# Patient Record
Sex: MALE | Race: WHITE | NOT HISPANIC OR LATINO | Employment: OTHER
[De-identification: names, ages, dates, MRNs, and addresses within clinical notes are randomized per-mention and may not be internally consistent; named-entity substitution may affect disease eponyms.]

---

## 2017-05-11 ENCOUNTER — HOSPITAL (OUTPATIENT)
Dept: OTHER | Age: 67
End: 2017-05-11
Attending: INTERNAL MEDICINE

## 2017-05-11 ENCOUNTER — PRIOR ORIGINAL RECORDS (OUTPATIENT)
Dept: OTHER | Age: 67
End: 2017-05-11

## 2017-05-11 NOTE — H&P
Kaya Rubio  : 10/17/1950  ACCOUNT:  [id number]  [phone]  PCP: Dr. Phil Zuniga     TODAY'S DATE: 2017  DICTATED BY:  [00]    CHIEF COMPLAINT: [Followup of Atherosclerosis Of Native Felicita, Followup of Atherosclerosis, aorta and Followup of Mix Respiration - 18, Temperature - 98.1, Weight -  207, Height -   64 , BMI - 35.5 ]    CONS: comfortable, in no distress and obese. WEIGHT: BMI parameters reviewed and discussed. HEAD/FACE: no trauma and normocephalic.  EYES: conjunctivae not injected and no 0.25MG    one by mouth as needed                   07/08/10 ClonazePAM            1MG       1 TABLET AT BEDTIME.                     01/14/10 Aspirin               325MG     1 TABLET DAILY AS DIRECTED.              01/14/10 TraMADol HCl          50MG

## 2017-05-12 ENCOUNTER — APPOINTMENT (OUTPATIENT)
Dept: GENERAL RADIOLOGY | Facility: HOSPITAL | Age: 67
End: 2017-05-12
Attending: INTERNAL MEDICINE
Payer: MEDICARE

## 2017-05-12 ENCOUNTER — DIAGNOSTIC TRANS (OUTPATIENT)
Dept: OTHER | Age: 67
End: 2017-05-12

## 2017-05-12 ENCOUNTER — HOSPITAL ENCOUNTER (OUTPATIENT)
Dept: INTERVENTIONAL RADIOLOGY/VASCULAR | Facility: HOSPITAL | Age: 67
Discharge: HOME | End: 2017-05-13
Attending: INTERNAL MEDICINE | Admitting: INTERNAL MEDICINE
Payer: MEDICARE

## 2017-05-12 DIAGNOSIS — R07.9 CHEST PAIN: ICD-10-CM

## 2017-05-12 PROBLEM — Z95.820 S/P ANGIOPLASTY WITH STENT: Status: ACTIVE | Noted: 2017-05-12

## 2017-05-12 PROCEDURE — 93010 ELECTROCARDIOGRAM REPORT: CPT | Performed by: INTERNAL MEDICINE

## 2017-05-12 PROCEDURE — 80048 BASIC METABOLIC PNL TOTAL CA: CPT | Performed by: INTERNAL MEDICINE

## 2017-05-12 PROCEDURE — B240ZZ3 ULTRASONOGRAPHY OF SINGLE CORONARY ARTERY, INTRAVASCULAR: ICD-10-PCS | Performed by: INTERNAL MEDICINE

## 2017-05-12 PROCEDURE — 93459 L HRT ART/GRFT ANGIO: CPT

## 2017-05-12 PROCEDURE — 93005 ELECTROCARDIOGRAM TRACING: CPT

## 2017-05-12 PROCEDURE — 85347 COAGULATION TIME ACTIVATED: CPT

## 2017-05-12 PROCEDURE — 71010 XR CHEST AP PORTABLE  (CPT=71010): CPT | Performed by: INTERNAL MEDICINE

## 2017-05-12 PROCEDURE — B211YZZ FLUOROSCOPY OF MULTIPLE CORONARY ARTERIES USING OTHER CONTRAST: ICD-10-PCS | Performed by: INTERNAL MEDICINE

## 2017-05-12 PROCEDURE — 99152 MOD SED SAME PHYS/QHP 5/>YRS: CPT

## 2017-05-12 PROCEDURE — B212YZZ FLUOROSCOPY OF SINGLE CORONARY ARTERY BYPASS GRAFT USING OTHER CONTRAST: ICD-10-PCS | Performed by: INTERNAL MEDICINE

## 2017-05-12 PROCEDURE — 027035Z DILATION OF CORONARY ARTERY, ONE ARTERY WITH TWO DRUG-ELUTING INTRALUMINAL DEVICES, PERCUTANEOUS APPROACH: ICD-10-PCS | Performed by: INTERNAL MEDICINE

## 2017-05-12 PROCEDURE — 4A023N7 MEASUREMENT OF CARDIAC SAMPLING AND PRESSURE, LEFT HEART, PERCUTANEOUS APPROACH: ICD-10-PCS | Performed by: INTERNAL MEDICINE

## 2017-05-12 PROCEDURE — B215YZZ FLUOROSCOPY OF LEFT HEART USING OTHER CONTRAST: ICD-10-PCS | Performed by: INTERNAL MEDICINE

## 2017-05-12 PROCEDURE — 99153 MOD SED SAME PHYS/QHP EA: CPT

## 2017-05-12 PROCEDURE — 85027 COMPLETE CBC AUTOMATED: CPT | Performed by: INTERNAL MEDICINE

## 2017-05-12 PROCEDURE — 92978 ENDOLUMINL IVUS OCT C 1ST: CPT

## 2017-05-12 RX ORDER — TERAZOSIN 5 MG/1
10 CAPSULE ORAL EVERY EVENING
Status: DISCONTINUED | OUTPATIENT
Start: 2017-05-13 | End: 2017-05-13

## 2017-05-12 RX ORDER — PANTOPRAZOLE SODIUM 40 MG/1
40 TABLET, DELAYED RELEASE ORAL DAILY
Status: DISCONTINUED | OUTPATIENT
Start: 2017-05-12 | End: 2017-05-13

## 2017-05-12 RX ORDER — ASPIRIN 325 MG
325 TABLET, DELAYED RELEASE (ENTERIC COATED) ORAL DAILY
Status: DISCONTINUED | OUTPATIENT
Start: 2017-05-13 | End: 2017-05-13

## 2017-05-12 RX ORDER — CLONAZEPAM 1 MG/1
1 TABLET ORAL NIGHTLY PRN
Status: DISCONTINUED | OUTPATIENT
Start: 2017-05-12 | End: 2017-05-13

## 2017-05-12 RX ORDER — DICYCLOMINE HYDROCHLORIDE 10 MG/1
10 CAPSULE ORAL
Status: DISCONTINUED | OUTPATIENT
Start: 2017-05-12 | End: 2017-05-13

## 2017-05-12 RX ORDER — SODIUM CHLORIDE 9 MG/ML
INJECTION, SOLUTION INTRAVENOUS CONTINUOUS
Status: ACTIVE | OUTPATIENT
Start: 2017-05-12 | End: 2017-05-12

## 2017-05-12 RX ORDER — PRAVASTATIN SODIUM 20 MG
80 TABLET ORAL
Status: DISCONTINUED | OUTPATIENT
Start: 2017-05-12 | End: 2017-05-13

## 2017-05-12 RX ORDER — NITROGLYCERIN 20 MG/100ML
5 INJECTION INTRAVENOUS CONTINUOUS
Status: DISCONTINUED | OUTPATIENT
Start: 2017-05-12 | End: 2017-05-13

## 2017-05-12 RX ORDER — NITROGLYCERIN 20 MG/100ML
INJECTION INTRAVENOUS
Status: COMPLETED
Start: 2017-05-12 | End: 2017-05-12

## 2017-05-12 RX ORDER — ASPIRIN 325 MG
325 TABLET, DELAYED RELEASE (ENTERIC COATED) ORAL DAILY
Status: DISCONTINUED | OUTPATIENT
Start: 2017-05-12 | End: 2017-05-12

## 2017-05-12 RX ORDER — ASPIRIN 325 MG
TABLET, DELAYED RELEASE (ENTERIC COATED) ORAL
Status: COMPLETED
Start: 2017-05-12 | End: 2017-05-12

## 2017-05-12 RX ORDER — ISOSORBIDE MONONITRATE 60 MG/1
120 TABLET, EXTENDED RELEASE ORAL DAILY
Status: DISCONTINUED | OUTPATIENT
Start: 2017-05-13 | End: 2017-05-13

## 2017-05-12 RX ORDER — ALPRAZOLAM 0.5 MG/1
0.5 TABLET ORAL 2 TIMES DAILY
Status: DISCONTINUED | OUTPATIENT
Start: 2017-05-12 | End: 2017-05-13

## 2017-05-12 RX ORDER — SODIUM CHLORIDE 9 MG/ML
INJECTION, SOLUTION INTRAVENOUS CONTINUOUS
Status: DISCONTINUED | OUTPATIENT
Start: 2017-05-12 | End: 2017-05-13

## 2017-05-12 RX ORDER — MIDAZOLAM HYDROCHLORIDE 1 MG/ML
INJECTION INTRAMUSCULAR; INTRAVENOUS
Status: COMPLETED
Start: 2017-05-12 | End: 2017-05-12

## 2017-05-12 RX ORDER — ATENOLOL 50 MG/1
50 TABLET ORAL DAILY
Status: DISCONTINUED | OUTPATIENT
Start: 2017-05-13 | End: 2017-05-13

## 2017-05-12 RX ORDER — CLOPIDOGREL BISULFATE 75 MG/1
75 TABLET ORAL DAILY
Status: DISCONTINUED | OUTPATIENT
Start: 2017-05-13 | End: 2017-05-13

## 2017-05-12 RX ORDER — MIDAZOLAM HYDROCHLORIDE 1 MG/ML
INJECTION INTRAMUSCULAR; INTRAVENOUS
Status: DISCONTINUED
Start: 2017-05-12 | End: 2017-05-12 | Stop reason: WASHOUT

## 2017-05-12 RX ORDER — CLOPIDOGREL BISULFATE 75 MG/1
75 TABLET ORAL DAILY
Status: DISCONTINUED | OUTPATIENT
Start: 2017-05-13 | End: 2017-05-12

## 2017-05-12 RX ORDER — HEPARIN SODIUM 5000 [USP'U]/ML
INJECTION, SOLUTION INTRAVENOUS; SUBCUTANEOUS
Status: COMPLETED
Start: 2017-05-12 | End: 2017-05-12

## 2017-05-12 RX ORDER — TRAMADOL HYDROCHLORIDE 50 MG/1
50 TABLET ORAL EVERY 8 HOURS PRN
Status: DISCONTINUED | OUTPATIENT
Start: 2017-05-12 | End: 2017-05-13

## 2017-05-12 RX ORDER — LIDOCAINE HYDROCHLORIDE 10 MG/ML
INJECTION, SOLUTION INFILTRATION; PERINEURAL
Status: COMPLETED
Start: 2017-05-12 | End: 2017-05-12

## 2017-05-12 RX ORDER — ASPIRIN 81 MG/1
324 TABLET, CHEWABLE ORAL ONCE
Status: COMPLETED | OUTPATIENT
Start: 2017-05-12 | End: 2017-05-12

## 2017-05-12 RX ADMIN — ALPRAZOLAM 0.5 MG: 0.5 TABLET ORAL at 11:57:00

## 2017-05-12 RX ADMIN — CLONAZEPAM 1 MG: 1 TABLET ORAL at 22:28:00

## 2017-05-12 RX ADMIN — SODIUM CHLORIDE: 9 INJECTION, SOLUTION INTRAVENOUS at 10:45:00

## 2017-05-12 RX ADMIN — ASPIRIN 324 MG: 81 TABLET, CHEWABLE ORAL at 08:00:00

## 2017-05-12 RX ADMIN — ALPRAZOLAM 0.5 MG: 0.5 TABLET ORAL at 22:28:00

## 2017-05-12 RX ADMIN — NITROGLYCERIN 5 MCG/MIN: 20 INJECTION INTRAVENOUS at 11:32:00

## 2017-05-12 RX ADMIN — SODIUM CHLORIDE: 9 INJECTION, SOLUTION INTRAVENOUS at 08:00:00

## 2017-05-12 NOTE — H&P
History & Physical Examination    Patient Name: Bereket Holder   MRN: [id number]  CSN: [id number]  YOB: 1950    Diagnosis: Coronary artery disease, status post coronary artery bypass surgery, unstable angina    Present Illness: Cardiac Unknown time   psyllium (METAMUCIL SMOOTH TEXTURE) 28 % Oral Powd Pack Take 1 packet by mouth daily.  Disp:  Rfl:  Unknown at Unknown time       Current Facility-Administered Medications:  0.9%  NaCl infusion  Intravenous Continuous       Allergies: No Know LUMBAR/SACRAL, SINGLE LEVEL Right 5/13/2014    Comment Procedure: TRANSFORAMINAL EPIDURAL - LUMBAR;  Surgeon: Gasper Bee MD;  Location: [address] [place] [address]    PATIENT [address] FOR IV ANTIBIOTIC SURGICAL SITE INFECTION PRO

## 2017-05-12 NOTE — PROCEDURES
[facility]  PCI Procedure Note    Lisandro Moreira.  Location: Cath Lab    [facility] [id number] MRN [id number]   Admission Date 5/12/2017 Procedure Date 5/12/2017   Attending Physician Ry Linares MD Procedure Physician Brett Parra MD     Pre-Proced deployed. It was postdilated in the proximal segment with a 3.0 mm balloon. 3.0 x 12 mm Synergy stent was deployed at the ostium of the saphenous vein graft.   The proximal segment was postdilated with a 4.0 mm balloon extending out into the aorta due t lateral branch is widely patent. This is a very large right coronary artery  5. The LIMA to the LAD is widely patent there is no significant retrograde flow there is excellent antegrade flow with no significant disease in the distal LAD  6.   The previous could be wired no balloon would pass into this segment. The patient was sent to the floor in stable condition.     Nancy Hernandez MD  5/12/2017  10:26 AM

## 2017-05-12 NOTE — PLAN OF CARE
Received pt from cath lab w/ daughter at bedside. Pt A&O, denies pain. VSS. NSR on tele, frequent trigeminal PVCs. Angiomax, nitro gtt infusing upon arrival- nitro gtt to remain in place and infuse continuously.   Sheath in place upon arrival, removed

## 2017-05-12 NOTE — DIETARY NOTE
Nutrition Short Note   Dietitian consult received per cardiac rehab standing order. Pt to be educated by cardiac rehab staff and encouraged to attend outpatient classes taught by RD. RD available PRN.      Izzy Saravia RD, LDN  Pager [phone]

## 2017-05-13 ENCOUNTER — PRIOR ORIGINAL RECORDS (OUTPATIENT)
Dept: OTHER | Age: 67
End: 2017-05-13

## 2017-05-13 VITALS
WEIGHT: 200 LBS | HEIGHT: 66 IN | DIASTOLIC BLOOD PRESSURE: 75 MMHG | HEART RATE: 71 BPM | TEMPERATURE: 98 F | OXYGEN SATURATION: 93 % | BODY MASS INDEX: 32.14 KG/M2 | SYSTOLIC BLOOD PRESSURE: 121 MMHG | RESPIRATION RATE: 20 BRPM

## 2017-05-13 PROCEDURE — 80048 BASIC METABOLIC PNL TOTAL CA: CPT | Performed by: INTERNAL MEDICINE

## 2017-05-13 PROCEDURE — 85027 COMPLETE CBC AUTOMATED: CPT | Performed by: INTERNAL MEDICINE

## 2017-05-13 RX ORDER — ATENOLOL 50 MG/1
50 TABLET ORAL DAILY
Qty: 90 TABLET | Refills: 3 | Status: ON HOLD | OUTPATIENT
Start: 2017-05-13 | End: 2019-06-21

## 2017-05-13 RX ADMIN — PANTOPRAZOLE SODIUM 40 MG: 40 TABLET, DELAYED RELEASE ORAL at 10:26:00

## 2017-05-13 RX ADMIN — ISOSORBIDE MONONITRATE 120 MG: 60 TABLET, EXTENDED RELEASE ORAL at 10:26:00

## 2017-05-13 RX ADMIN — ALPRAZOLAM 0.5 MG: 0.5 TABLET ORAL at 10:26:00

## 2017-05-13 RX ADMIN — PRAVASTATIN SODIUM 80 MG: 20 MG TABLET ORAL at 10:25:00

## 2017-05-13 RX ADMIN — ATENOLOL 50 MG: 50 TABLET ORAL at 10:26:00

## 2017-05-13 RX ADMIN — CLOPIDOGREL BISULFATE 75 MG: 75 TABLET ORAL at 10:26:00

## 2017-05-13 RX ADMIN — ASPIRIN 325 MG: 325 MG TABLET, DELAYED RELEASE (ENTERIC COATED) ORAL at 10:25:00

## 2017-05-13 NOTE — CARDIAC REHAB
Completed CAD/stent education. Plans on doing cardiac rehab in [facility]. Has the number. Has been there several times.

## 2017-05-13 NOTE — PROGRESS NOTES
Per Leslie, Cardiac Rehab: they do not give out stent cards on the weekend. It will be mailed to him.

## 2017-05-13 NOTE — PLAN OF CARE
Patient/Family Goals    • Patient/Family Long Term Goal Adequate for Discharge    • Patient/Family Short Term Goal Adequate for Discharge        Neuro: AOx4    HEENT: WNL    Resp: Clear. Room air. Cardiac: NSR w/frequent PVC.     GI: WNL    : WNL    Ri

## 2017-05-13 NOTE — PROGRESS NOTES
[facility] Progress Note    Subjective:  Feels good, uneventful night.      Objective:  /75 mmHg  Pulse 71  Temp(Src) 98.1 °F (36.7 °C) (Oral)  Resp 20  Ht 5' 6\" (1.676 m)  Wt 200 lb (90.719 kg)  BMI 32.30 kg/m2  SpO2 95%    Telemetry: SR, PV

## 2017-05-13 NOTE — PLAN OF CARE
S/p cardiac cath with stent placement. Right groin remains soft, clean, and intact. No signs of hematoma. Denies any chest or groin pain. Nitro drip infusing per orders at 5 mcg/kg/min. Ambulating and voiding without difficulty post procedure.   Sinus

## 2017-05-16 LAB
BUN: 18 MG/DL
CALCIUM: 8.9 MG/DL
CHLORIDE: 106 MEQ/L
CREATININE, SERUM: 0.99 MG/DL
GLUCOSE: 100 MG/DL
HEMATOCRIT: 39.2 %
HEMOGLOBIN: 12.8 G/DL
PLATELETS: 138 K/UL
POTASSIUM, SERUM: 3.7 MEQ/L
RED BLOOD COUNT: 4.47 X 10-6/U
SODIUM: 140 MEQ/L
WHITE BLOOD COUNT: 10.9 X 10-3/U

## 2017-06-22 PROBLEM — Z98.890 S/P COLONOSCOPY: Status: ACTIVE | Noted: 2017-06-22

## 2017-06-22 PROBLEM — Z79.899 ENCOUNTER FOR LONG-TERM (CURRENT) USE OF MEDICATIONS: Status: ACTIVE | Noted: 2017-06-22

## 2017-06-22 PROBLEM — Z11.59 NEED FOR HEPATITIS C SCREENING TEST: Status: ACTIVE | Noted: 2017-06-22

## 2017-08-31 ENCOUNTER — PRIOR ORIGINAL RECORDS (OUTPATIENT)
Dept: OTHER | Age: 67
End: 2017-08-31

## 2017-08-31 ENCOUNTER — HOSPITAL (OUTPATIENT)
Dept: OTHER | Age: 67
End: 2017-08-31
Attending: INTERNAL MEDICINE

## 2017-08-31 LAB
CHOLEST SERPL-MCNC: 134 MG/DL
CHOLEST/HDLC SERPL: 3 {RATIO}
HDLC SERPL-MCNC: 45 MG/DL
LDLC SERPL CALC-MCNC: 77 MG/DL
NONHDLC SERPL-MCNC: 89 MG/DL
TRIGLYCERIDE (TRIGP): 61 MG/DL

## 2017-10-11 ENCOUNTER — PRIOR ORIGINAL RECORDS (OUTPATIENT)
Dept: OTHER | Age: 67
End: 2017-10-11

## 2017-10-22 PROBLEM — Z28.21 INFLUENZA VACCINE REFUSED: Status: ACTIVE | Noted: 2017-10-22

## 2018-04-03 ENCOUNTER — MYAURORA ACCOUNT LINK (OUTPATIENT)
Dept: OTHER | Age: 68
End: 2018-04-03

## 2018-04-03 ENCOUNTER — HOSPITAL ENCOUNTER (OUTPATIENT)
Dept: GENERAL RADIOLOGY | Facility: HOSPITAL | Age: 68
Discharge: HOME | End: 2018-04-03
Attending: INTERNAL MEDICINE
Payer: MEDICARE

## 2018-04-03 ENCOUNTER — LAB ENCOUNTER (OUTPATIENT)
Dept: LAB | Facility: HOSPITAL | Age: 68
End: 2018-04-03
Attending: INTERNAL MEDICINE
Payer: MEDICARE

## 2018-04-03 ENCOUNTER — PRIOR ORIGINAL RECORDS (OUTPATIENT)
Dept: OTHER | Age: 68
End: 2018-04-03

## 2018-04-03 DIAGNOSIS — R07.9 CHEST PAIN: ICD-10-CM

## 2018-04-03 DIAGNOSIS — R53.83 EXHAUSTION: ICD-10-CM

## 2018-04-03 DIAGNOSIS — R53.83 FATIGUE: Primary | ICD-10-CM

## 2018-04-03 DIAGNOSIS — R07.9 CHEST PAIN, UNSPECIFIED: ICD-10-CM

## 2018-04-03 PROCEDURE — 80061 LIPID PANEL: CPT

## 2018-04-03 PROCEDURE — 36415 COLL VENOUS BLD VENIPUNCTURE: CPT

## 2018-04-03 PROCEDURE — 80053 COMPREHEN METABOLIC PANEL: CPT

## 2018-04-03 PROCEDURE — 85025 COMPLETE CBC W/AUTO DIFF WBC: CPT

## 2018-04-03 PROCEDURE — 71046 X-RAY EXAM CHEST 2 VIEWS: CPT | Performed by: INTERNAL MEDICINE

## 2018-04-03 NOTE — HISTORICAL OFFICE NOTE
Miranda Nathanael  : 10/17/1950  ACCOUNT:  [id number]  [phone]  PCP: Dr. Donis Hammans     TODAY'S DATE: 2018  DICTATED BY:  ASHU Poole]      CHIEF COMPLAINT: [Followup of Chest pain.]    HPI:    [On 2018, Rika Strickland, a 75 year catheterization August 2004, Diag.  branch distal SVG graft 5/12/17, dyslipidemia, PTCA/Stent, PTCA/Stent August 2004, PTCA/Stent July 2010, redo CABG 4/95: seq SVG to RPV, RCA; CABG 5/89: LIMA to LAD, SVG to Diag, taxus drug eluting stents of third obtuse interventions in May 2017. Discussed with patient regarding his symptoms again he felt it was very similar to what he had prior to his interventions. He denies any symptoms today.   Patient has not had any recent ischemic workup in terms of stress testing 325MG     1 TABLET DAILY AS DIRECTED.

## 2018-04-04 ENCOUNTER — HOSPITAL ENCOUNTER (OUTPATIENT)
Dept: INTERVENTIONAL RADIOLOGY/VASCULAR | Facility: HOSPITAL | Age: 68
Discharge: HOME | End: 2018-04-04
Attending: INTERNAL MEDICINE | Admitting: INTERNAL MEDICINE
Payer: MEDICARE

## 2018-04-04 VITALS
BODY MASS INDEX: 32.78 KG/M2 | HEART RATE: 75 BPM | HEIGHT: 66 IN | WEIGHT: 204 LBS | OXYGEN SATURATION: 99 % | DIASTOLIC BLOOD PRESSURE: 56 MMHG | TEMPERATURE: 97 F | SYSTOLIC BLOOD PRESSURE: 122 MMHG | RESPIRATION RATE: 16 BRPM

## 2018-04-04 DIAGNOSIS — I25.10 CAD (CORONARY ARTERY DISEASE): ICD-10-CM

## 2018-04-04 PROCEDURE — 4A033BC MEASUREMENT OF ARTERIAL PRESSURE, CORONARY, PERCUTANEOUS APPROACH: ICD-10-PCS | Performed by: INTERNAL MEDICINE

## 2018-04-04 PROCEDURE — 99153 MOD SED SAME PHYS/QHP EA: CPT

## 2018-04-04 PROCEDURE — 93571 IV DOP VEL&/PRESS C FLO 1ST: CPT

## 2018-04-04 PROCEDURE — 99152 MOD SED SAME PHYS/QHP 5/>YRS: CPT

## 2018-04-04 PROCEDURE — 93455 CORONARY ART/GRFT ANGIO S&I: CPT

## 2018-04-04 PROCEDURE — 85347 COAGULATION TIME ACTIVATED: CPT

## 2018-04-04 PROCEDURE — B2111ZZ FLUOROSCOPY OF MULTIPLE CORONARY ARTERIES USING LOW OSMOLAR CONTRAST: ICD-10-PCS | Performed by: INTERNAL MEDICINE

## 2018-04-04 PROCEDURE — B2131ZZ FLUOROSCOPY OF MULTIPLE CORONARY ARTERY BYPASS GRAFTS USING LOW OSMOLAR CONTRAST: ICD-10-PCS | Performed by: INTERNAL MEDICINE

## 2018-04-04 RX ORDER — HEPARIN SODIUM 5000 [USP'U]/ML
INJECTION, SOLUTION INTRAVENOUS; SUBCUTANEOUS
Status: COMPLETED
Start: 2018-04-04 | End: 2018-04-04

## 2018-04-04 RX ORDER — ADENOSINE 3 MG/ML
INJECTION, SOLUTION INTRAVENOUS
Status: COMPLETED
Start: 2018-04-04 | End: 2018-04-04

## 2018-04-04 RX ORDER — LIDOCAINE HYDROCHLORIDE 10 MG/ML
INJECTION, SOLUTION INFILTRATION; PERINEURAL
Status: COMPLETED
Start: 2018-04-04 | End: 2018-04-04

## 2018-04-04 RX ORDER — MIDAZOLAM HYDROCHLORIDE 1 MG/ML
INJECTION INTRAMUSCULAR; INTRAVENOUS
Status: COMPLETED
Start: 2018-04-04 | End: 2018-04-04

## 2018-04-04 RX ORDER — SODIUM CHLORIDE 9 MG/ML
INJECTION, SOLUTION INTRAVENOUS CONTINUOUS
Status: DISCONTINUED | OUTPATIENT
Start: 2018-04-04 | End: 2018-04-04

## 2018-04-04 RX ADMIN — MIDAZOLAM HYDROCHLORIDE 2 MG: 1 INJECTION INTRAMUSCULAR; INTRAVENOUS at 13:00:00

## 2018-04-04 NOTE — H&P
History & Physical Examination    Patient Name: Armon Leyden.   MRN: [id number]  CSN: [id number]  YOB: 1950    Diagnosis: Coronary artery disease, status post coronary artery bypass surgery, recurrent chest pain,    Present Illness: Ca 4/4/2018 at Unknown time   Clopidogrel Bisulfate (PLAVIX) 75 MG Oral Tab Take 75 mg by mouth daily.  Disp:  Rfl:  4/4/2018 at `   TRAMADOL HCL 50 MG Oral Tab TAKE 1 TABLET BY MOUTH EVERY 8 HOURS AS NEEDED FOR PAIN Disp: 60 tablet Rfl: 0 Unknown at [facility]  5/13/2014: INJECTION, ANESTHETIC/STEROID, TRANSFORAMINAL * Right      Comment: Procedure: TRANSFORAMINAL EPIDURAL - LUMBAR;                 Surgeon: Randall Stauffer MD;  Location: [address] [place] [address] MD  4/4/2018  10:13 AM

## 2018-04-04 NOTE — PROGRESS NOTES
Rc'd pt from cath lab in stable condition. VSS. Sheath to right groin is soft, clean and dry. Dr Jorge Govea at bedside. Sheath to remain in place for 1hr, pull and use femstop.  Pt c/o mild discomfort in groin and requesting something prior to sheath removal. V

## 2018-04-04 NOTE — PROCEDURES
[facility]  Angiogram Procedure Note    Rhea Ta.  Location: Cath Lab    [facility] [id number] MRN [id number]   Admission Date 4/4/2018 Procedure Date 4/4/2018   Attending Physician Daniel Crum MD Procedure Physician May Dominguez MD     Pre-Pr Coronary Angiography Findings:  1. The right coronary artery is 100% occluded in the proximal segment  2. The saphenous vein graft to the right coronary artery is patent. There is excellent flow down the PDA.   There is 30-40% disease in a previously sean

## 2018-04-05 ENCOUNTER — PRIOR ORIGINAL RECORDS (OUTPATIENT)
Dept: OTHER | Age: 68
End: 2018-04-05

## 2018-04-06 ENCOUNTER — PRIOR ORIGINAL RECORDS (OUTPATIENT)
Dept: OTHER | Age: 68
End: 2018-04-06

## 2018-04-11 LAB
ALBUMIN: 3.4 G/DL
ALKALINE PHOSPHATATE(ALK PHOS): 69 IU/L
BILIRUBIN TOTAL: 0.5 MG/DL
BUN: 14 MG/DL
CALCIUM: 8.7 MG/DL
CHLORIDE: 109 MEQ/L
CHOLESTEROL, TOTAL: 83 MG/DL
CREATININE, SERUM: 1.09 MG/DL
GLUCOSE: 86 MG/DL
HDL CHOLESTEROL: 36 MG/DL
HEMATOCRIT: 45 %
HEMOGLOBIN: 14.3 G/DL
LDL CHOLESTEROL: 35 MG/DL
PLATELETS: 184 K/UL
POTASSIUM, SERUM: 4.5 MEQ/L
PROTEIN, TOTAL: 7 G/DL
RED BLOOD COUNT: 4.94 X 10-6/U
SGOT (AST): 15 IU/L
SGPT (ALT): 22 IU/L
SODIUM: 143 MEQ/L
TRIGLYCERIDES: 62 MG/DL
WHITE BLOOD COUNT: 8.4 X 10-3/U

## 2018-04-20 ENCOUNTER — PRIOR ORIGINAL RECORDS (OUTPATIENT)
Dept: OTHER | Age: 68
End: 2018-04-20

## 2018-04-26 ENCOUNTER — TELEPHONE (OUTPATIENT)
Dept: NEPHROLOGY | Facility: CLINIC | Age: 68
End: 2018-04-26

## 2018-04-26 DIAGNOSIS — I63.9 CEREBROVASCULAR ACCIDENT (CVA), UNSPECIFIED MECHANISM (HCC): Primary | ICD-10-CM

## 2018-04-28 PROBLEM — K76.0 STEATOSIS OF LIVER: Status: ACTIVE | Noted: 2018-04-28

## 2018-04-29 PROBLEM — Z28.21 REFUSED STREPTOCOCCUS PNEUMONIAE VACCINATION: Status: ACTIVE | Noted: 2018-04-29

## 2018-05-03 ENCOUNTER — HOSPITAL ENCOUNTER (OUTPATIENT)
Dept: CT IMAGING | Facility: HOSPITAL | Age: 68
Discharge: HOME | End: 2018-05-03
Attending: INTERNAL MEDICINE
Payer: MEDICARE

## 2018-05-03 DIAGNOSIS — R07.89 OTHER CHEST PAIN: ICD-10-CM

## 2018-05-03 PROCEDURE — 71260 CT THORAX DX C+: CPT | Performed by: INTERNAL MEDICINE

## 2018-05-08 ENCOUNTER — OFFICE VISIT (OUTPATIENT)
Dept: NEUROLOGY | Facility: CLINIC | Age: 68
End: 2018-05-08

## 2018-05-08 VITALS
DIASTOLIC BLOOD PRESSURE: 68 MMHG | RESPIRATION RATE: 16 BRPM | WEIGHT: 210 LBS | BODY MASS INDEX: 33.75 KG/M2 | SYSTOLIC BLOOD PRESSURE: 129 MMHG | HEART RATE: 65 BPM | HEIGHT: 66 IN

## 2018-05-08 DIAGNOSIS — R47.9 SPEECH DISTURBANCE, UNSPECIFIED TYPE: ICD-10-CM

## 2018-05-08 DIAGNOSIS — I63.81 LACUNAR INFARCTION (HCC): ICD-10-CM

## 2018-05-08 DIAGNOSIS — R26.89 BALANCE PROBLEMS: Primary | ICD-10-CM

## 2018-05-08 DIAGNOSIS — R53.83 OTHER FATIGUE: ICD-10-CM

## 2018-05-08 PROCEDURE — 99203 OFFICE O/P NEW LOW 30 MIN: CPT | Performed by: PHYSICIAN ASSISTANT

## 2018-05-08 RX ORDER — CLONAZEPAM 1 MG/1
1 TABLET ORAL NIGHTLY PRN
COMMUNITY
End: 2020-01-24

## 2018-05-08 RX ORDER — DIAZEPAM 5 MG/1
TABLET ORAL
Qty: 2 TABLET | Refills: 0 | Status: SHIPPED | OUTPATIENT
Start: 2018-05-08 | End: 2018-09-25 | Stop reason: ALTCHOICE

## 2018-05-08 NOTE — PATIENT INSTRUCTIONS
Refill policies:    • Allow 2-3 business days for refills; controlled substances may take longer.   • Contact your pharmacy at least 5 days prior to running out of medication and have them send an electronic request or submit request through the “request re entire amount billed. Precertification and Prior Authorizations: If your physician has recommended that you have a procedure or additional testing performed.   [facility]) will contact your insurance carrier to obtain pre-certi

## 2018-05-08 NOTE — PROGRESS NOTES
[facility] with [address]  10/17/1950  Primary Care Provider:  Lindaann Riedel, MD    5/8/2018    75year old male patient being seen for: Subacute Stroke on CT Scan, Grady Enrique prior to MRI, may repeat one time, Disp: 2 tablet, Rfl: 0  •  PRAVASTATIN SODIUM 80 MG Oral Tab, TAKE 1 TABLET BY MOUTH DAILY, Disp: 90 tablet, Rfl: 3  •  nitroGLYCERIN 0.4 MG Sublingual SL Tab, Place 1 tablet (0.4 mg total) under the tongue every 5 (five) abnormal sounds  Pink nailbeds no Cyanosis    NAd, A&Ox3  EOMI  CN II-XII intact  Strength 5/5 all extremities  DTRs symmetric  FTN intact bilaterally. No drift on exam  Normal Gait. Unable to tandem gait.  Slight swaying on romberg testing    Problem/s Brit Prateek PM    Decision making:  (  ) labs reviewed/ordered - 1  (  ) new diagnosis: - 1  (  ) Images & studies independently reviewed -non F2F  (  ) Case/studies discussed with other caregivers - -non F2F  (  ) Telephone time with patiern or authorized Fam member-

## 2018-05-10 ENCOUNTER — HOSPITAL ENCOUNTER (OUTPATIENT)
Dept: MRI IMAGING | Facility: HOSPITAL | Age: 68
Discharge: HOME | End: 2018-05-10
Attending: INTERNAL MEDICINE
Payer: MEDICARE

## 2018-05-10 ENCOUNTER — IMAGING SERVICES (OUTPATIENT)
Dept: OTHER | Age: 68
End: 2018-05-10

## 2018-05-10 DIAGNOSIS — I63.9 CEREBROVASCULAR ACCIDENT (CVA), UNSPECIFIED MECHANISM (HCC): ICD-10-CM

## 2018-05-10 PROCEDURE — 70551 MRI BRAIN STEM W/O DYE: CPT | Performed by: INTERNAL MEDICINE

## 2018-05-10 PROCEDURE — 70544 MR ANGIOGRAPHY HEAD W/O DYE: CPT | Performed by: INTERNAL MEDICINE

## 2018-05-18 ENCOUNTER — TELEPHONE (OUTPATIENT)
Dept: NEPHROLOGY | Facility: CLINIC | Age: 68
End: 2018-05-18

## 2018-05-22 ENCOUNTER — PRIOR ORIGINAL RECORDS (OUTPATIENT)
Dept: OTHER | Age: 68
End: 2018-05-22

## 2018-05-31 ENCOUNTER — HOSPITAL ENCOUNTER (OUTPATIENT)
Dept: CV DIAGNOSTICS | Facility: HOSPITAL | Age: 68
Discharge: HOME | End: 2018-05-31
Attending: PHYSICIAN ASSISTANT
Payer: MEDICARE

## 2018-05-31 DIAGNOSIS — R47.9 SPEECH DISTURBANCE, UNSPECIFIED TYPE: ICD-10-CM

## 2018-05-31 DIAGNOSIS — R26.89 BALANCE PROBLEMS: ICD-10-CM

## 2018-05-31 DIAGNOSIS — I63.81 LACUNAR INFARCTION (HCC): ICD-10-CM

## 2018-05-31 DIAGNOSIS — R53.83 OTHER FATIGUE: ICD-10-CM

## 2018-05-31 PROBLEM — Z79.899 ENCOUNTER FOR LONG-TERM (CURRENT) USE OF MEDICATIONS: Status: ACTIVE | Noted: 2018-05-31

## 2018-05-31 PROBLEM — Z11.59 NEED FOR HEPATITIS C SCREENING TEST: Status: ACTIVE | Noted: 2018-05-31

## 2018-05-31 PROBLEM — Z98.890 S/P COLONOSCOPY: Status: ACTIVE | Noted: 2018-05-31

## 2018-05-31 PROCEDURE — 93306 TTE W/DOPPLER COMPLETE: CPT | Performed by: PHYSICIAN ASSISTANT

## 2018-06-05 ENCOUNTER — PRIOR ORIGINAL RECORDS (OUTPATIENT)
Dept: OTHER | Age: 68
End: 2018-06-05

## 2018-06-05 ENCOUNTER — TELEPHONE (OUTPATIENT)
Dept: NEUROLOGY | Facility: CLINIC | Age: 68
End: 2018-06-05

## 2018-06-07 ENCOUNTER — HOSPITAL (OUTPATIENT)
Dept: OTHER | Age: 68
End: 2018-06-07
Attending: INTERNAL MEDICINE

## 2018-06-07 ENCOUNTER — PRIOR ORIGINAL RECORDS (OUTPATIENT)
Dept: OTHER | Age: 68
End: 2018-06-07

## 2018-06-18 ENCOUNTER — PRIOR ORIGINAL RECORDS (OUTPATIENT)
Dept: OTHER | Age: 68
End: 2018-06-18

## 2018-06-19 ENCOUNTER — PRIOR ORIGINAL RECORDS (OUTPATIENT)
Dept: OTHER | Age: 68
End: 2018-06-19

## 2018-06-21 ENCOUNTER — HOSPITAL ENCOUNTER (OUTPATIENT)
Dept: MRI IMAGING | Facility: HOSPITAL | Age: 68
Discharge: HOME | End: 2018-06-21
Attending: PHYSICIAN ASSISTANT
Payer: MEDICARE

## 2018-06-21 DIAGNOSIS — I63.81 LACUNAR INFARCTION (HCC): ICD-10-CM

## 2018-06-21 DIAGNOSIS — R53.83 OTHER FATIGUE: ICD-10-CM

## 2018-06-21 DIAGNOSIS — R26.89 BALANCE PROBLEMS: ICD-10-CM

## 2018-06-21 DIAGNOSIS — R47.9 SPEECH DISTURBANCE, UNSPECIFIED TYPE: ICD-10-CM

## 2018-06-21 PROCEDURE — 70549 MR ANGIOGRAPH NECK W/O&W/DYE: CPT | Performed by: PHYSICIAN ASSISTANT

## 2018-06-21 PROCEDURE — A9576 INJ PROHANCE MULTIPACK: HCPCS | Performed by: PHYSICIAN ASSISTANT

## 2018-06-22 ENCOUNTER — TELEPHONE (OUTPATIENT)
Dept: NEUROLOGY | Facility: CLINIC | Age: 68
End: 2018-06-22

## 2018-06-22 NOTE — TELEPHONE ENCOUNTER
----- Message from KVNG Yang sent at 6/21/2018  8:42 PM CDT -----  Please call to inform patient that MRA neck was unremarkable.

## 2018-06-29 ENCOUNTER — PRIOR ORIGINAL RECORDS (OUTPATIENT)
Dept: OTHER | Age: 68
End: 2018-06-29

## 2018-07-12 ENCOUNTER — PRIOR ORIGINAL RECORDS (OUTPATIENT)
Dept: OTHER | Age: 68
End: 2018-07-12

## 2018-07-12 ENCOUNTER — HOSPITAL (OUTPATIENT)
Dept: OTHER | Age: 68
End: 2018-07-12
Attending: INTERNAL MEDICINE

## 2018-07-13 ENCOUNTER — PRIOR ORIGINAL RECORDS (OUTPATIENT)
Dept: OTHER | Age: 68
End: 2018-07-13

## 2018-07-26 ENCOUNTER — PRIOR ORIGINAL RECORDS (OUTPATIENT)
Dept: OTHER | Age: 68
End: 2018-07-26

## 2018-09-25 ENCOUNTER — LABORATORY ENCOUNTER (OUTPATIENT)
Dept: LAB | Age: 68
End: 2018-09-25
Attending: INTERNAL MEDICINE
Payer: MEDICARE

## 2018-09-25 ENCOUNTER — OFFICE VISIT (OUTPATIENT)
Dept: INTERNAL MEDICINE CLINIC | Facility: CLINIC | Age: 68
End: 2018-09-25
Payer: MEDICARE

## 2018-09-25 VITALS
HEIGHT: 66 IN | BODY MASS INDEX: 33.53 KG/M2 | HEART RATE: 68 BPM | TEMPERATURE: 98 F | DIASTOLIC BLOOD PRESSURE: 64 MMHG | WEIGHT: 208.63 LBS | SYSTOLIC BLOOD PRESSURE: 122 MMHG

## 2018-09-25 DIAGNOSIS — M79.641 BILATERAL HAND PAIN: ICD-10-CM

## 2018-09-25 DIAGNOSIS — N40.1 BPH ASSOCIATED WITH NOCTURIA: ICD-10-CM

## 2018-09-25 DIAGNOSIS — M79.642 BILATERAL HAND PAIN: ICD-10-CM

## 2018-09-25 DIAGNOSIS — E78.00 ELEVATED CHOLESTEROL: ICD-10-CM

## 2018-09-25 DIAGNOSIS — I25.10 ASHD (ARTERIOSCLEROTIC HEART DISEASE): Primary | ICD-10-CM

## 2018-09-25 DIAGNOSIS — I63.81 LACUNAR INFARCTION (HCC): ICD-10-CM

## 2018-09-25 DIAGNOSIS — Z95.1 HX OF CABG: ICD-10-CM

## 2018-09-25 DIAGNOSIS — R97.20 ELEVATED PSA: ICD-10-CM

## 2018-09-25 DIAGNOSIS — Z98.61 S/P PTCA (PERCUTANEOUS TRANSLUMINAL CORONARY ANGIOPLASTY): ICD-10-CM

## 2018-09-25 DIAGNOSIS — R35.1 BPH ASSOCIATED WITH NOCTURIA: ICD-10-CM

## 2018-09-25 DIAGNOSIS — G47.33 OBSTRUCTIVE SLEEP APNEA: ICD-10-CM

## 2018-09-25 LAB — RHEUMATOID FACT SERPL-ACNC: <10 IU/ML (ref ?–15)

## 2018-09-25 PROCEDURE — 86431 RHEUMATOID FACTOR QUANT: CPT

## 2018-09-25 PROCEDURE — 99204 OFFICE O/P NEW MOD 45 MIN: CPT | Performed by: INTERNAL MEDICINE

## 2018-09-25 PROCEDURE — 86200 CCP ANTIBODY: CPT

## 2018-09-26 NOTE — PROGRESS NOTES
Patient presents with:  Establish Care: LG. Room 9. HPI:  Here to establish care for htn, high chol,  Severe cad since age 43, antoni on cpap, bph with psa elevation seen by gu. Pt is stable, his md retired.  Feels well currently, moves slower than in hi injections [6/16] - HOLGER Monreal     Refused Influenza vaccine      Steatosis of liver / Dx by U/S [4/18]     Refused Streptococcus pneumoniae vaccination     Lacunar infarction, [ ~ 4/18 ] Rt internal capsule ([facility]     Hepatitis C screening [6/18/14] - negati kg/m²   Constitutional: Oriented to person, place, and time. No distress. HEENT:  Normocephalic and atraumatic. TM's wnl. Nose normal. Oropharynx is clear and moist.   Eyes: Conjunctivae wnl. Neck: Normal range of motion. Neck supple.  Normal carotid pu

## 2018-09-27 ENCOUNTER — HOSPITAL ENCOUNTER (OUTPATIENT)
Dept: GENERAL RADIOLOGY | Facility: HOSPITAL | Age: 68
Discharge: HOME | End: 2018-09-27
Attending: INTERNAL MEDICINE
Payer: MEDICARE

## 2018-09-27 DIAGNOSIS — M79.642 BILATERAL HAND PAIN: ICD-10-CM

## 2018-09-27 DIAGNOSIS — M79.641 BILATERAL HAND PAIN: ICD-10-CM

## 2018-09-27 LAB — CYCLIC CITRULLINATED PEPTIDE: 51 UNITS

## 2018-09-27 PROCEDURE — 73130 X-RAY EXAM OF HAND: CPT | Performed by: INTERNAL MEDICINE

## 2018-09-28 ENCOUNTER — TELEPHONE (OUTPATIENT)
Dept: INTERNAL MEDICINE CLINIC | Facility: CLINIC | Age: 68
End: 2018-09-28

## 2018-10-09 ENCOUNTER — OFFICE VISIT (OUTPATIENT)
Dept: RHEUMATOLOGY | Facility: CLINIC | Age: 68
End: 2018-10-09
Payer: MEDICARE

## 2018-10-09 VITALS
SYSTOLIC BLOOD PRESSURE: 112 MMHG | HEART RATE: 68 BPM | DIASTOLIC BLOOD PRESSURE: 72 MMHG | RESPIRATION RATE: 16 BRPM | WEIGHT: 209 LBS | BODY MASS INDEX: 34 KG/M2

## 2018-10-09 DIAGNOSIS — M19.049 CMC ARTHRITIS: ICD-10-CM

## 2018-10-09 DIAGNOSIS — R76.8 POSITIVE ANTI-CCP TEST: ICD-10-CM

## 2018-10-09 DIAGNOSIS — M47.812 SPONDYLOSIS OF CERVICAL REGION WITHOUT MYELOPATHY OR RADICULOPATHY: ICD-10-CM

## 2018-10-09 DIAGNOSIS — M19.90 INFLAMMATORY ARTHRITIS: Primary | ICD-10-CM

## 2018-10-09 PROCEDURE — 96372 THER/PROPH/DIAG INJ SC/IM: CPT | Performed by: INTERNAL MEDICINE

## 2018-10-09 PROCEDURE — 99204 OFFICE O/P NEW MOD 45 MIN: CPT | Performed by: INTERNAL MEDICINE

## 2018-10-09 RX ORDER — METHYLPREDNISOLONE ACETATE 40 MG/ML
40 INJECTION, SUSPENSION INTRA-ARTICULAR; INTRALESIONAL; INTRAMUSCULAR; SOFT TISSUE ONCE
Status: COMPLETED | OUTPATIENT
Start: 2018-10-09 | End: 2018-10-09

## 2018-10-09 RX ADMIN — METHYLPREDNISOLONE ACETATE 40 MG: 40 INJECTION, SUSPENSION INTRA-ARTICULAR; INTRALESIONAL; INTRAMUSCULAR; SOFT TISSUE at 17:14:00

## 2018-10-09 NOTE — PROGRESS NOTES
?  RHEUMATOLOGY NEW PATIENT   Date of visit: 10/9/2018  ? Patient presents with:  Hand Pain: NP ref by PCP for bilateral wirst/hand pain. Pt states 'is having a hard time with lifting fingers or things, when the pain is bad, it's pretty bad.  Pain will w TO 9 ENAS; Future  -     methylPREDNISolone acetate (DEPO-medrol) 40 MG/ML injection 40 mg; Inject 1 mL (40 mg total) into the articular space once. -     RHEUMATOID ARTHRITIS FACTOR; Future  -     CYCLIC CITRULLINATE PEP.  IGG; Future    Positive anti-CC prolonged bleeding/easy bruising but attributes to anti-platelet tx for heart. Has family history of rheumatoid arthritis in his father. States he had it in his neck. But not sure if he was on any medication for it.    Both siblings (one brother, one si Procedure Laterality Date   • ANGIOGRAM     • ANGIOPLASTY (CORONARY)      2014   • ANGIOPLASTY (CORONARY)      11/2015   • ANGIOPLASTY (CORONARY)  1/12/16    PTCA-right Posterior   • CABG      1988, 1994   • CATH BARE METAL STENT (BMS)     • COLONOSCOPY PREOPERATIVE ORDER FOR IV ANTIBIOTIC SURGICAL SITE INFECTION PROPHYLAXIS.   4/14/2014    Procedure: LUMBAR RADIOFREQUENCY;  Surgeon: Krista Antonio MD;  Location: [facility]   • PATIENT [address] FOR IV ANTIBIOTIC SURG tablet Rfl: 0   Isosorbide Mononitrate ER 60 MG Oral Tablet 24 Hr Take 120 mg by mouth every morning.  Along with 60 mg at night  Disp:  Rfl: 0   ALPRAZOLAM 0.25 MG Oral Tab TAKE 1 TABLET BY MOUTH 2 TIMES A DAY AS NEEDED Disp: 60 tablet Rfl: 2   lansoprazol ?  Current Weight Height BMI BSA Pain   Wt Readings from Last 1 Encounters:  10/09/18 : 209 lb     Body mass index is 33.73 kg/m². Body surface area is 2.04 meters squared.          Physical Exam   Constitutional: He is oriented to person, place, and time vitals reviewed. ?  Radiology review:  Xr Hand (min 3 Views), Left (cpt=73130)    Result Date: 9/27/2018  CONCLUSION:  Mild osteoarthritic changes are present. No acute fracture or other acute bony process.    Dictated by: Gisselle Oconnor MD on 9/27/2018

## 2018-10-10 ENCOUNTER — TELEPHONE (OUTPATIENT)
Dept: RHEUMATOLOGY | Facility: CLINIC | Age: 68
End: 2018-10-10

## 2018-10-10 RX ORDER — METHYLPREDNISOLONE 4 MG/1
TABLET ORAL
Qty: 1 KIT | Refills: 0 | Status: SHIPPED | OUTPATIENT
Start: 2018-10-10 | End: 2018-12-18

## 2018-10-10 NOTE — TELEPHONE ENCOUNTER
Returned pt's call. States after the injection he noticed significant improvement of pain of right hand. Still having pain in left hand today. Will order medrol dose pack and monitor for continued improvement of symptoms.  Pt will go to get bloodwork done t

## 2018-10-11 ENCOUNTER — HOSPITAL (OUTPATIENT)
Dept: OTHER | Age: 68
End: 2018-10-11

## 2018-10-11 LAB
ANA TITER: <40
CCP3 IGG: 58 UNITS
CRP INFLAMMATION: 9.4 MG/L (ref 0.1–8.2)
RF: <20 INT.UNITS/ML
SED RATE: 15 MM/HR (ref 0–20)

## 2018-10-15 ENCOUNTER — TELEPHONE (OUTPATIENT)
Dept: RHEUMATOLOGY | Facility: CLINIC | Age: 68
End: 2018-10-15

## 2018-10-15 RX ORDER — METHYLPREDNISOLONE ACETATE 40 MG/ML
40 INJECTION, SUSPENSION INTRA-ARTICULAR; INTRALESIONAL; INTRAMUSCULAR; SOFT TISSUE ONCE
Status: DISCONTINUED | OUTPATIENT
Start: 2018-10-15 | End: 2019-02-19

## 2018-10-15 NOTE — TELEPHONE ENCOUNTER
Returned pt's call. States the depomedrol has improved his symptoms, mostly of the right hand but still has some left hand pain. Swelling has significantly improved. Discussed elevated ESR/CRP and negative RF, repeat CCP pending.  I do believe that he has a

## 2018-10-19 ENCOUNTER — TELEPHONE (OUTPATIENT)
Dept: RHEUMATOLOGY | Facility: CLINIC | Age: 68
End: 2018-10-19

## 2018-10-25 RX ORDER — PREDNISONE 1 MG/1
TABLET ORAL
Qty: 60 TABLET | Refills: 0 | Status: SHIPPED | OUTPATIENT
Start: 2018-10-25 | End: 2018-11-01

## 2018-10-25 NOTE — TELEPHONE ENCOUNTER
Returned pt's call. States hands have become swollen and tender again since stopping the steroids. Reports noting a significant improvement while on medrol. Will start pt on low dose prednisone and see pt sooner to re-evaluate.      Patient verbalized un

## 2018-11-01 ENCOUNTER — OFFICE VISIT (OUTPATIENT)
Dept: RHEUMATOLOGY | Facility: CLINIC | Age: 68
End: 2018-11-01
Payer: MEDICARE

## 2018-11-01 VITALS
BODY MASS INDEX: 34 KG/M2 | WEIGHT: 208 LBS | RESPIRATION RATE: 16 BRPM | HEART RATE: 72 BPM | SYSTOLIC BLOOD PRESSURE: 116 MMHG | DIASTOLIC BLOOD PRESSURE: 72 MMHG

## 2018-11-01 DIAGNOSIS — R76.8 CYCLIC CITRULLINATED PEPTIDE (CCP) ANTIBODY POSITIVE: ICD-10-CM

## 2018-11-01 DIAGNOSIS — M06.041 RHEUMATOID ARTHRITIS INVOLVING BOTH HANDS WITH NEGATIVE RHEUMATOID FACTOR (HCC): Primary | ICD-10-CM

## 2018-11-01 DIAGNOSIS — M06.042 RHEUMATOID ARTHRITIS INVOLVING BOTH HANDS WITH NEGATIVE RHEUMATOID FACTOR (HCC): Primary | ICD-10-CM

## 2018-11-01 DIAGNOSIS — M19.049 CMC ARTHRITIS: ICD-10-CM

## 2018-11-01 DIAGNOSIS — Z79.899 HIGH RISK MEDICATION USE: ICD-10-CM

## 2018-11-01 PROCEDURE — 99215 OFFICE O/P EST HI 40 MIN: CPT | Performed by: INTERNAL MEDICINE

## 2018-11-01 RX ORDER — PREDNISONE 1 MG/1
TABLET ORAL
Qty: 120 TABLET | Refills: 0 | Status: SHIPPED | OUTPATIENT
Start: 2018-11-01 | End: 2019-02-19

## 2018-11-01 RX ORDER — HYDROXYCHLOROQUINE SULFATE 200 MG/1
TABLET, FILM COATED ORAL
Qty: 60 TABLET | Refills: 1 | Status: SHIPPED | OUTPATIENT
Start: 2018-11-01 | End: 2018-12-18

## 2018-11-01 NOTE — PROGRESS NOTES
?  RHEUMATOLOGY Follow up   Date of visit: 11/1/2018  ? Patient presents with:  Hand Pain: Pt states 'feels like left hand near thumb is hurting more. Fingers feel better and while being on steroids is also feeling better.'     ?   ASSESSMENT, DISCUSSION benefit of this medication outweighs its risk. Patient verbalized understanding of above instructions. No further questions at this time.     ?  Plan:  Diagnoses and all orders for this visit:    Rheumatoid arthritis involving both hands with negative rhe Does feel like his BP has been slightly elevated since starting the prednisone. HPI from initial consultation  Has been having significant hand pain and swelling over the past few months (3-4m).  Has tried taking tylenol which hasn't helped the pain at renal disease, or history of seizures. No history of prior blood clot in the legs or lungs. Denies nonhealing ulcers on the fingertips, skin calcifications, trouble swallowing.   The patient denies any history of uveitis, bloody stools, nodular painful sh MANAGEMENT   • INJECTION, ANESTHETIC/STEROID, TRANSFORAMINAL EPIDURAL; LUMBAR/SACRAL, SINGLE LEVEL  7/23/2012    Procedure: TRANSFORAMINAL EPIDURAL - LUMBAR;  Surgeon: Marissa Winkler MD;  Location: [address] [place] [address]   • INJECTION, ANESTHETIC/ • TRANSFORAMINAL EPIDURAL - LUMBAR N/A 7/23/2012    Performed by Deborah Rees MD at [facility]   • TRANSFORAMINAL EPIDURAL - LUMBAR N/A 6/29/2012    Performed by Deborah Rees MD at [address] [facility] OF SYSTEMS   ? Review of Systems   Constitutional: Positive for malaise/fatigue. HENT: Negative for hearing loss and tinnitus. Eyes: Negative for blurred vision and double vision. Respiratory: Negative for shortness of breath and wheezing.     Marry Cavazos elbows, ankles, or joints of the feet. +right elbow crepitus. Bilateral shoulders with restricted ROM, particularly right shoulder with abduction. Bilateral knees without medial joint line tenderness, mild crepitus, no effusion.    Lymphadenopathy: deformity or other acute bony abnormality. Osteoarthritic changes are noted, mild degree. Tissue swelling involving particularly the index finger and to a lesser extent the thumb.        =====  CONCLUSION:  Mild osteoarthritic changes are present.  No acut

## 2018-11-01 NOTE — PATIENT INSTRUCTIONS
-- continue prednisone 10mg daily x 2 weeks then decrease to 1.5 tabs (7.5mg) until next appointment, if feeling well can try to decrease to one tablet  -- start plaquenil one tablet daily x 7 days then increase to two tablets daily  -- call me with any qu dangerous change in heartbeat or heart rhythm like chest pain; dizziness; fast or irregular heartbeat; palpitations; feeling faint or lightheaded, falls; breathing problems  · signs and symptoms of liver injury like dark yellow or brown urine; general ill and 30 degrees C (59 and 86 degrees F). Protect from moisture and light. Throw away any unused medicine after the expiration date. What should I tell my health care provider before I take this medicine?   They need to know if you have any of these conditio

## 2018-11-05 ENCOUNTER — TELEPHONE (OUTPATIENT)
Dept: RHEUMATOLOGY | Facility: CLINIC | Age: 68
End: 2018-11-05

## 2018-11-05 NOTE — TELEPHONE ENCOUNTER
Pt called. Pt states 'would like to know if should start Plaquenil now or wait until after has a vision exam? To get an appointment with eye doctor will take a few weeks.' Please advise.         Pt [phone]

## 2018-11-13 ENCOUNTER — TELEPHONE (OUTPATIENT)
Dept: RHEUMATOLOGY | Facility: CLINIC | Age: 68
End: 2018-11-13

## 2018-11-13 NOTE — TELEPHONE ENCOUNTER
Called pt informed, it's not typically to have eye changes so soon after starting plaquenil. He should discuss with the ophthalmologist and PCP and update us on what they say. Pt to continue plaquenil BID. Pt verbalized understanding.

## 2018-11-13 NOTE — TELEPHONE ENCOUNTER
Pt called. Pt states 'just wanted Ty [place]uns[place] to know that everything cleared up.  Whatever was going on has gone away.' mp

## 2018-11-13 NOTE — TELEPHONE ENCOUNTER
Pt called he increased his plaquenil to BID yesterday and woke up this morning with right eye blurriness that has not subsided. Pt stated he did get baseline eye exam that was normal prior to starting plaquenil.  Pt did not take his plaquenil this am, wants

## 2018-12-01 ENCOUNTER — PRIOR ORIGINAL RECORDS (OUTPATIENT)
Dept: HEALTH INFORMATION MANAGEMENT | Facility: OTHER | Age: 68
End: 2018-12-01

## 2018-12-03 ENCOUNTER — TELEPHONE (OUTPATIENT)
Dept: RHEUMATOLOGY | Facility: CLINIC | Age: 68
End: 2018-12-03

## 2018-12-03 NOTE — TELEPHONE ENCOUNTER
Returned pt's call. Reports since starting the plaquenil, he has been having worsened dizziness. He did see ophthalmology which grossly normal exam. Pt states he did notice improvement of symptoms in hands however the dizziness is becoming unbearable.   R

## 2018-12-18 ENCOUNTER — LAB ENCOUNTER (OUTPATIENT)
Dept: LAB | Age: 68
End: 2018-12-18
Attending: INTERNAL MEDICINE
Payer: MEDICARE

## 2018-12-18 ENCOUNTER — OFFICE VISIT (OUTPATIENT)
Dept: RHEUMATOLOGY | Facility: CLINIC | Age: 68
End: 2018-12-18
Payer: MEDICARE

## 2018-12-18 VITALS
HEART RATE: 68 BPM | RESPIRATION RATE: 16 BRPM | DIASTOLIC BLOOD PRESSURE: 74 MMHG | SYSTOLIC BLOOD PRESSURE: 126 MMHG | WEIGHT: 211 LBS | BODY MASS INDEX: 34 KG/M2

## 2018-12-18 DIAGNOSIS — M06.041 RHEUMATOID ARTHRITIS INVOLVING BOTH HANDS WITH NEGATIVE RHEUMATOID FACTOR (HCC): Primary | ICD-10-CM

## 2018-12-18 DIAGNOSIS — M19.049 CMC ARTHRITIS: ICD-10-CM

## 2018-12-18 DIAGNOSIS — M06.041 RHEUMATOID ARTHRITIS INVOLVING BOTH HANDS WITH NEGATIVE RHEUMATOID FACTOR (HCC): ICD-10-CM

## 2018-12-18 DIAGNOSIS — M06.042 RHEUMATOID ARTHRITIS INVOLVING BOTH HANDS WITH NEGATIVE RHEUMATOID FACTOR (HCC): ICD-10-CM

## 2018-12-18 DIAGNOSIS — R76.8 CYCLIC CITRULLINATED PEPTIDE (CCP) ANTIBODY POSITIVE: ICD-10-CM

## 2018-12-18 DIAGNOSIS — M65.4 DE QUERVAIN'S DISEASE (TENOSYNOVITIS): ICD-10-CM

## 2018-12-18 DIAGNOSIS — M06.042 RHEUMATOID ARTHRITIS INVOLVING BOTH HANDS WITH NEGATIVE RHEUMATOID FACTOR (HCC): Primary | ICD-10-CM

## 2018-12-18 DIAGNOSIS — Z79.899 HIGH RISK MEDICATION USE: ICD-10-CM

## 2018-12-18 PROCEDURE — 80053 COMPREHEN METABOLIC PANEL: CPT

## 2018-12-18 PROCEDURE — 86140 C-REACTIVE PROTEIN: CPT

## 2018-12-18 PROCEDURE — 99215 OFFICE O/P EST HI 40 MIN: CPT | Performed by: INTERNAL MEDICINE

## 2018-12-18 PROCEDURE — 85025 COMPLETE CBC W/AUTO DIFF WBC: CPT

## 2018-12-18 PROCEDURE — 85652 RBC SED RATE AUTOMATED: CPT

## 2018-12-18 NOTE — PROGRESS NOTES
?  RHEUMATOLOGY Follow up   Date of visit: 12/18/2018  ? Patient presents with:  Rheumatoid Arthritis: f/u. Pt did not have blood work done. Pt states 'is having pain in hands, they hurt so much that can't run jacket over them.  Sometimes will have a renetta ([facility]    Cyclic citrullinated peptide (CCP) antibody positive    High risk medication use    CMC arthritis    De Quervain's disease (tenosynovitis)        Return in about 2 months (around 2/18/2019). ?   HPI   Maxim Salinas. is a 70year old male wi see Dr. Newton Fears and had cortisone injection about one year ago which helped with the pain. However pt states pain is coming back now in that shoulder. Hx of GERD on lansoprazole. Hx of endoscopy. Was on Bentyl but stopped after some time.    Hx of chronic l ill-defined, cerebrovascular disease    • Arthritis    • Atherosclerosis of coronary artery    • BACK PAIN    • Blurred vision    • BPH (benign prostatic hyperplasia)    • Chest pain on exertion    • Coronary atherosclerosis    • Heart palpitations    • Hi Frequency: Monthly or less      Drinks per session: 1 or 2      Binge frequency: Never      Comment: Cage done 9-25-18    Drug use: No    Medications:    No outpatient medications have been marked as taking for the 12/18/18 encounter (Office Visit) with BRENNA well-nourished. No distress. HENT:   Head: Normocephalic. Right Ear: External ear normal.   Left Ear: External ear normal.   Eyes: Conjunctivae and EOM are normal. Pupils are equal, round, and reactive to light. No scleral icterus. Neck: Neck supple. noted, mild degree. No soft tissue abnormalities.        =====  CONCLUSION:  Mild osteoarthritic changes are present. No acute fracture or other acute bony process.      PROCEDURE:  XR HAND (MIN 3 VIEWS), LEFT (CPT=73130)     TECHNIQUE:  Three views were o BILT 0.6 12/18/2018    TP 7.0 12/18/2018    ALB 3.7 12/18/2018    GLOBULIN 3.3 12/18/2018    AGRATIO 2.0 06/17/2014     12/18/2018    K 4.4 12/18/2018     12/18/2018    CO2 25.0 12/18/2018       Additional Labs:  10/2018  ESR 15 (N)  CRP high

## 2018-12-20 DIAGNOSIS — M06.042 RHEUMATOID ARTHRITIS INVOLVING BOTH HANDS WITH NEGATIVE RHEUMATOID FACTOR (HCC): Primary | ICD-10-CM

## 2018-12-20 DIAGNOSIS — M06.041 RHEUMATOID ARTHRITIS INVOLVING BOTH HANDS WITH NEGATIVE RHEUMATOID FACTOR (HCC): Primary | ICD-10-CM

## 2018-12-20 RX ORDER — METHYLPREDNISOLONE 4 MG/1
TABLET ORAL
Qty: 1 KIT | Refills: 0 | Status: SHIPPED | OUTPATIENT
Start: 2018-12-20 | End: 2019-02-19

## 2019-01-01 ENCOUNTER — EXTERNAL RECORD (OUTPATIENT)
Dept: HEALTH INFORMATION MANAGEMENT | Facility: OTHER | Age: 69
End: 2019-01-01

## 2019-01-25 ENCOUNTER — PRIOR ORIGINAL RECORDS (OUTPATIENT)
Dept: OTHER | Age: 69
End: 2019-01-25

## 2019-02-05 ENCOUNTER — PRIOR ORIGINAL RECORDS (OUTPATIENT)
Dept: OTHER | Age: 69
End: 2019-02-05

## 2019-02-05 ENCOUNTER — HOSPITAL (OUTPATIENT)
Dept: OTHER | Age: 69
End: 2019-02-05
Attending: INTERNAL MEDICINE

## 2019-02-05 LAB
TSH SERPL-ACNC: 0.83 MCUNIT/ML (ref 0.35–5)
TSH SERPL-ACNC: 0.83 MCUNITS/ML (ref 0.35–5)

## 2019-02-05 NOTE — TELEPHONE ENCOUNTER
Called pt whom stated his hands were slightly improved when on medrol dose pack, but when finished bilateral hand pain/swelling resumed with left hand worse than right. altered level of consciousness

## 2019-02-08 LAB
ALT (SGPT): 22 U/L
AST (SGOT): 15 U/L
THYROID STIMULATING HORMONE: 0.83 MLU/L

## 2019-02-12 ENCOUNTER — PRIOR ORIGINAL RECORDS (OUTPATIENT)
Dept: OTHER | Age: 69
End: 2019-02-12

## 2019-02-18 ENCOUNTER — PRIOR ORIGINAL RECORDS (OUTPATIENT)
Dept: OTHER | Age: 69
End: 2019-02-18

## 2019-02-19 ENCOUNTER — TELEPHONE (OUTPATIENT)
Dept: INTERNAL MEDICINE CLINIC | Facility: CLINIC | Age: 69
End: 2019-02-19

## 2019-02-19 ENCOUNTER — OFFICE VISIT (OUTPATIENT)
Dept: RHEUMATOLOGY | Facility: CLINIC | Age: 69
End: 2019-02-19
Payer: MEDICARE

## 2019-02-19 VITALS
RESPIRATION RATE: 16 BRPM | DIASTOLIC BLOOD PRESSURE: 64 MMHG | SYSTOLIC BLOOD PRESSURE: 122 MMHG | TEMPERATURE: 97 F | HEART RATE: 80 BPM | BODY MASS INDEX: 34 KG/M2 | WEIGHT: 211 LBS

## 2019-02-19 DIAGNOSIS — M79.641 BILATERAL HAND PAIN: ICD-10-CM

## 2019-02-19 DIAGNOSIS — M47.812 SPONDYLOSIS OF CERVICAL REGION WITHOUT MYELOPATHY OR RADICULOPATHY: ICD-10-CM

## 2019-02-19 DIAGNOSIS — G89.4 CHRONIC PAIN SYNDROME: ICD-10-CM

## 2019-02-19 DIAGNOSIS — M19.049 CMC ARTHRITIS: ICD-10-CM

## 2019-02-19 DIAGNOSIS — M15.9 PRIMARY OSTEOARTHRITIS INVOLVING MULTIPLE JOINTS: Primary | ICD-10-CM

## 2019-02-19 DIAGNOSIS — M79.642 BILATERAL HAND PAIN: ICD-10-CM

## 2019-02-19 DIAGNOSIS — M65.4 DE QUERVAIN'S DISEASE (TENOSYNOVITIS): ICD-10-CM

## 2019-02-19 DIAGNOSIS — R76.8 CYCLIC CITRULLINATED PEPTIDE (CCP) ANTIBODY POSITIVE: ICD-10-CM

## 2019-02-19 PROCEDURE — 99215 OFFICE O/P EST HI 40 MIN: CPT | Performed by: INTERNAL MEDICINE

## 2019-02-19 RX ORDER — DULOXETIN HYDROCHLORIDE 30 MG/1
30 CAPSULE, DELAYED RELEASE ORAL DAILY
Qty: 30 CAPSULE | Refills: 1 | Status: SHIPPED | OUTPATIENT
Start: 2019-02-19 | End: 2019-04-04

## 2019-02-19 RX ORDER — HYDROXYCHLOROQUINE SULFATE 200 MG/1
200 TABLET, FILM COATED ORAL 2 TIMES DAILY
COMMUNITY
End: 2019-02-19

## 2019-02-19 NOTE — PROGRESS NOTES
?  RHEUMATOLOGY Follow up   Date of visit: 2/19/2019  ? Patient presents with: Follow - Up: Pt states 'medication helped a little bit but not a lot. Is having a lot of pain in hands, joint stiffness.      ?  ASSESSMENT, DISCUSSION & PLAN   Assessment: effects discussed including GI discomfort (nausea, constipation), headache, drowsiness, difficulty sleeping, and dizziness. Patient verbalized understanding of above and agrees to proceed with starting therapy with Cymbalta.   Recommended pt consider occupa ago due to elevated inflammatory markers as well as bilateral hand pain and swelling. He was started on a medrol dose pack and symptoms improved, however upon completion, her swelling and pain got worse.  Gave him a higher dose oral prednisone taper, which grasp objects due to the pain and inability to make a fist. Does admit to some shooting, burning pain in his thumb when bending the fingers/making a fist. Describes pain as constant. States severity waxes/wanes.  Does admit to worsened symptoms in the [facility] enthesitis, psoriatic lesions, spooning or pitting of the nails, history of dactylitis. There are no symptoms of severe dry eyes, dry mouth, swelling of the cheeks or under the jawbone.   Denies any crampy abdominal pain, bloating, tenesmus, blood in the 6/29/2012    Performed by Martin Noriega MD at [address]     Family History:  Family History   Problem Relation Age of Onset   • Heart Disorder Mother    • Heart Disorder Brother      Social History:  Social History    Tobacco Use Slight dizziness   Endo/Heme/Allergies: Does not bruise/bleed easily. Psychiatric/Behavioral: The patient is not nervous/anxious and does not have insomnia.       PHYSICAL EXAM   Today's Vitals:  Temperature Blood Pressure Heart Rate Resp Rate SpO2 reviewed. ?  Radiology review:       PROCEDURE:  XR HAND (MIN 3 VIEWS), RIGHT (CPT=73130)     TECHNIQUE:  Three views were obtained. COMPARISON:  None.      INDICATIONS:  M79.642 Pain in left hand M79.641 Pain in right hand     PATIENT STATED HISTORY 12/18/2018    NEPRELIM 5.36 12/18/2018    NEPERCENT 66.7 12/18/2018    LYPERCENT 18.6 12/18/2018    MOPERCENT 11.2 12/18/2018    EOPERCENT 2.0 12/18/2018    BAPERCENT 0.9 12/18/2018    NE 5.36 12/18/2018    LYMABS 1.49 12/18/2018    MOABSO 0.90 12/18/2018

## 2019-02-19 NOTE — PATIENT INSTRUCTIONS
-- stop plaquenil  -- I do not think your symptoms are due to rheumatoid arthritis right now, but more osteoarthritis and possible overactive nerves  -- start cymbalta one tablet nightly   -- try to avoid tramadol if possible, okay to take once during the behavior  · eye pain  · fast, irregular heartbeat  · feeling faint or lightheaded, falls  · feeling agitated, angry, or irritable  · hallucination, loss of contact with reality  · high blood pressure  · loss of balance or coordination  · palpitations  · re naproxen  · phentermine  · procarbazine  · rasagiline  · sibutramine  · Danny's wort  · theophylline  · tramadol  · tryptophan  What if I miss a dose? If you miss a dose, take it as soon as you can.  If it is almost time for your next dose, take only th this medicine affects you. Do not stand or sit up quickly, especially if you are an older patient. This reduces the risk of dizzy or fainting spells. Alcohol may interfere with the effect of this medicine. Avoid alcoholic drinks.   This medicine can cause a

## 2019-02-19 NOTE — TELEPHONE ENCOUNTER
Message   Received:  Today   Message Contents   Aixa Carlos MD  [facility] Clinical Staff             Can we see Mr Hurleymontana Cole

## 2019-02-20 ENCOUNTER — TELEPHONE (OUTPATIENT)
Dept: RHEUMATOLOGY | Facility: CLINIC | Age: 69
End: 2019-02-20

## 2019-02-20 NOTE — TELEPHONE ENCOUNTER
Called pt back, spoke with pt's wife ([id number] per HIPAA). She stated pt has appt with AS on 2/26 at 3:45 pm and is unsure if/why he needs sooner appt. Pt cannot come Thurs at 8 am - appt cancelled. They can come later in the morning if needed.  Will d/w AS an

## 2019-02-20 NOTE — TELEPHONE ENCOUNTER
Patient Satinder Pozo calling back see TE. He will not be able to come in that early. Please call him back.    he takes his wife to dialysis at 9;15 this morning  Please advise

## 2019-02-20 NOTE — TELEPHONE ENCOUNTER
Called pt back, advised that he does not need appt sooner and that AS will see him 2/26 as scheduled. Pt could read back and verbalized understanding.

## 2019-02-21 ENCOUNTER — TELEPHONE (OUTPATIENT)
Dept: RHEUMATOLOGY | Facility: CLINIC | Age: 69
End: 2019-02-21

## 2019-02-21 NOTE — TELEPHONE ENCOUNTER
Pt phoned office, returned telephone call. Pt states that he had episode after taking one dose of Cymbalta. Pt developed chest pain, nausea and abdominal cramping. Explained to pt that nausea and abdominal cramping could be a side effect of medication.  Merlin Alejandro

## 2019-02-21 NOTE — TELEPHONE ENCOUNTER
Phoned pt, left voice message for pt. To follow up with pt question and condition from yesterday. Left voice message for pt to call our office.

## 2019-02-26 ENCOUNTER — OFFICE VISIT (OUTPATIENT)
Dept: INTERNAL MEDICINE CLINIC | Facility: CLINIC | Age: 69
End: 2019-02-26
Payer: MEDICARE

## 2019-02-26 VITALS
RESPIRATION RATE: 16 BRPM | DIASTOLIC BLOOD PRESSURE: 70 MMHG | SYSTOLIC BLOOD PRESSURE: 124 MMHG | WEIGHT: 211.38 LBS | BODY MASS INDEX: 33.97 KG/M2 | HEIGHT: 66 IN | HEART RATE: 64 BPM

## 2019-02-26 DIAGNOSIS — E78.00 ELEVATED CHOLESTEROL: ICD-10-CM

## 2019-02-26 DIAGNOSIS — R26.81 GAIT INSTABILITY: Primary | ICD-10-CM

## 2019-02-26 DIAGNOSIS — I10 ESSENTIAL HYPERTENSION: ICD-10-CM

## 2019-02-26 DIAGNOSIS — M06.042 RHEUMATOID ARTHRITIS INVOLVING BOTH HANDS WITH NEGATIVE RHEUMATOID FACTOR (HCC): ICD-10-CM

## 2019-02-26 DIAGNOSIS — R47.89 WORD FINDING DIFFICULTY: ICD-10-CM

## 2019-02-26 DIAGNOSIS — M06.041 RHEUMATOID ARTHRITIS INVOLVING BOTH HANDS WITH NEGATIVE RHEUMATOID FACTOR (HCC): ICD-10-CM

## 2019-02-26 DIAGNOSIS — R39.12 BENIGN PROSTATIC HYPERPLASIA WITH WEAK URINARY STREAM: ICD-10-CM

## 2019-02-26 DIAGNOSIS — N40.1 BENIGN PROSTATIC HYPERPLASIA WITH WEAK URINARY STREAM: ICD-10-CM

## 2019-02-26 PROCEDURE — 99214 OFFICE O/P EST MOD 30 MIN: CPT | Performed by: INTERNAL MEDICINE

## 2019-02-27 NOTE — PROGRESS NOTES
Patient presents with: Follow - Up: cn room 8: patient is here for a 4 month follow up       HPI:  Here for f/u of gait instability, this continues, has chronic pain in hands and feet, RA on meds, seen by rheum.  Pt has word finding difficulty for at least pneumoniae vaccination     Lacunar infarction, [ ~ 4/18 ] Rt internal capsule ([facility] [address]     Hepatitis C screening [6/18/14] - negative     S/P colonoscopy (4/10) recheck 10 years - A.  [address]     Encounter for long-term (current) use of medications     S/P PTCA ( Physical Exam  /70 (BP Location: Right arm, Patient Position: Sitting, Cuff Size: large)   Pulse 64   Resp 16   Ht 66\"   Wt 211 lb 6.4 oz   BMI 34.12 kg/m²   Constitutional: Oriented to person, place, and time. No distress.    HEENT:  Normoceph

## 2019-02-28 VITALS
RESPIRATION RATE: 20 BRPM | BODY MASS INDEX: 35.72 KG/M2 | HEART RATE: 68 BPM | WEIGHT: 209.22 LBS | DIASTOLIC BLOOD PRESSURE: 71 MMHG | SYSTOLIC BLOOD PRESSURE: 120 MMHG | HEIGHT: 64 IN | OXYGEN SATURATION: 97 % | TEMPERATURE: 97.5 F

## 2019-02-28 VITALS
OXYGEN SATURATION: 98 % | TEMPERATURE: 98.6 F | RESPIRATION RATE: 18 BRPM | HEIGHT: 64 IN | BODY MASS INDEX: 35.53 KG/M2 | DIASTOLIC BLOOD PRESSURE: 69 MMHG | SYSTOLIC BLOOD PRESSURE: 126 MMHG | HEART RATE: 65 BPM | WEIGHT: 208.14 LBS

## 2019-02-28 VITALS
HEART RATE: 70 BPM | DIASTOLIC BLOOD PRESSURE: 74 MMHG | SYSTOLIC BLOOD PRESSURE: 124 MMHG | OXYGEN SATURATION: 99 % | WEIGHT: 206.36 LBS | RESPIRATION RATE: 18 BRPM | TEMPERATURE: 98.1 F

## 2019-02-28 VITALS
WEIGHT: 208.34 LBS | TEMPERATURE: 98.2 F | OXYGEN SATURATION: 99 % | HEART RATE: 63 BPM | RESPIRATION RATE: 18 BRPM | BODY MASS INDEX: 35.57 KG/M2 | HEIGHT: 64 IN | SYSTOLIC BLOOD PRESSURE: 146 MMHG | DIASTOLIC BLOOD PRESSURE: 68 MMHG

## 2019-02-28 VITALS
BODY MASS INDEX: 34.66 KG/M2 | DIASTOLIC BLOOD PRESSURE: 74 MMHG | SYSTOLIC BLOOD PRESSURE: 126 MMHG | HEART RATE: 71 BPM | HEIGHT: 64 IN | WEIGHT: 203 LBS

## 2019-03-01 ENCOUNTER — TELEPHONE (OUTPATIENT)
Dept: RHEUMATOLOGY | Facility: CLINIC | Age: 69
End: 2019-03-01

## 2019-03-01 VITALS
DIASTOLIC BLOOD PRESSURE: 76 MMHG | OXYGEN SATURATION: 100 % | BODY MASS INDEX: 35.42 KG/M2 | TEMPERATURE: 98.1 F | HEART RATE: 65 BPM | HEIGHT: 64 IN | SYSTOLIC BLOOD PRESSURE: 122 MMHG | WEIGHT: 207.46 LBS | RESPIRATION RATE: 18 BRPM

## 2019-03-01 DIAGNOSIS — M19.90 INFLAMMATORY ARTHRITIS: Primary | ICD-10-CM

## 2019-03-01 DIAGNOSIS — M79.642 BILATERAL HAND PAIN: ICD-10-CM

## 2019-03-01 DIAGNOSIS — M79.641 BILATERAL HAND PAIN: ICD-10-CM

## 2019-03-01 RX ORDER — HYDROXYCHLOROQUINE SULFATE 200 MG/1
TABLET, FILM COATED ORAL
Qty: 60 TABLET | Refills: 2 | Status: SHIPPED | OUTPATIENT
Start: 2019-03-01 | End: 2019-07-23

## 2019-03-01 RX ORDER — PREDNISONE 1 MG/1
TABLET ORAL
Qty: 90 TABLET | Refills: 0 | Status: SHIPPED | OUTPATIENT
Start: 2019-03-01 | End: 2019-03-20

## 2019-03-01 NOTE — TELEPHONE ENCOUNTER
Return patient's call. States that since stopping the Cymbalta due to side effects, his hand pain has worsened. He also stopped the Plaquenil since we were not sure if this is truly helping with his symptoms.   Patient states that he had significant relie

## 2019-03-11 RX ORDER — LANSOPRAZOLE 30 MG/1
30 CAPSULE, DELAYED RELEASE ORAL DAILY
Qty: 90 CAPSULE | Refills: 1 | Status: SHIPPED | OUTPATIENT
Start: 2019-03-11 | End: 2019-10-11

## 2019-03-11 NOTE — TELEPHONE ENCOUNTER
LOV:2/26/19 AS  FOV:5/21/19  LAST RX:1/23/18 30 mg take 1 tab daily 90 tabs 3 refills   LAST LABS:12/18/18 cbc,cmp,creactive,sed rate  PER PROTOCOL: to provider

## 2019-03-20 ENCOUNTER — TELEPHONE (OUTPATIENT)
Dept: RHEUMATOLOGY | Facility: CLINIC | Age: 69
End: 2019-03-20

## 2019-03-20 DIAGNOSIS — M79.641 BILATERAL HAND PAIN: ICD-10-CM

## 2019-03-20 DIAGNOSIS — M79.642 BILATERAL HAND PAIN: ICD-10-CM

## 2019-03-20 DIAGNOSIS — M19.90 INFLAMMATORY ARTHRITIS: ICD-10-CM

## 2019-03-20 RX ORDER — PREDNISONE 1 MG/1
TABLET ORAL
Qty: 120 TABLET | Refills: 0 | Status: SHIPPED | OUTPATIENT
Start: 2019-03-20 | End: 2019-04-15

## 2019-03-20 NOTE — TELEPHONE ENCOUNTER
Patient has completed prednisone taper. He is now on 5 mg/day. Now that taper has completed he has increase pain to his hands. At the higher dose of prednisone he was pain free. He is taking plaquenil daily. Calling for advise.   Your appointments     Zak

## 2019-04-01 ENCOUNTER — TELEPHONE (OUTPATIENT)
Dept: CARDIOLOGY | Age: 69
End: 2019-04-01

## 2019-04-01 RX ORDER — CLOPIDOGREL BISULFATE 75 MG/1
75 TABLET ORAL DAILY
Qty: 30 TABLET | Refills: 6 | Status: SHIPPED | OUTPATIENT
Start: 2019-04-01 | End: 2019-11-26 | Stop reason: SDUPTHER

## 2019-04-01 RX ORDER — CLOPIDOGREL BISULFATE 75 MG/1
75 TABLET ORAL DAILY
COMMUNITY
Start: 2017-12-06 | End: 2019-04-01 | Stop reason: SDUPTHER

## 2019-04-01 RX ORDER — CLONAZEPAM 1 MG/1
1 TABLET ORAL AT BEDTIME
COMMUNITY
Start: 2018-07-13 | End: 2019-04-01 | Stop reason: SDUPTHER

## 2019-04-01 RX ORDER — CLONAZEPAM 1 MG/1
1 TABLET ORAL AT BEDTIME
Qty: 30 TABLET | Refills: 5 | Status: SHIPPED | OUTPATIENT
Start: 2019-04-01 | End: 2019-09-23 | Stop reason: SDUPTHER

## 2019-04-02 ENCOUNTER — TELEPHONE (OUTPATIENT)
Dept: CARDIOLOGY | Age: 69
End: 2019-04-02

## 2019-04-04 ENCOUNTER — OFFICE VISIT (OUTPATIENT)
Dept: NEUROLOGY | Facility: CLINIC | Age: 69
End: 2019-04-04
Payer: MEDICARE

## 2019-04-04 VITALS
SYSTOLIC BLOOD PRESSURE: 130 MMHG | BODY MASS INDEX: 33.91 KG/M2 | DIASTOLIC BLOOD PRESSURE: 72 MMHG | RESPIRATION RATE: 16 BRPM | HEART RATE: 82 BPM | HEIGHT: 66 IN | WEIGHT: 211 LBS

## 2019-04-04 DIAGNOSIS — R26.89 BALANCE PROBLEMS: Primary | ICD-10-CM

## 2019-04-04 DIAGNOSIS — R41.3 MEMORY PROBLEM: ICD-10-CM

## 2019-04-04 PROCEDURE — 99215 OFFICE O/P EST HI 40 MIN: CPT | Performed by: OTHER

## 2019-04-04 NOTE — PROGRESS NOTES
[facility] with [address]  10/17/1950  Primary Care Provider:  Echo Thomson MD    4/4/2019  Accompanied visit:  ( ) No (x) yes, by:      70year old yo patient being se 60 mg at night , Disp: , Rfl: 0  •  TRAMADOL HCL 50 MG Oral Tab, TAKE 1 TABLET BY MOUTH EVERY 8 HOURS AS NEEDED FOR PAIN, Disp: 60 tablet, Rfl: 0  •  atenolol 50 MG Oral Tab, Take 1 tablet (50 mg total) by mouth daily. , Disp: 90 tablet, Rfl: 3  •  Dicyclom results, follow up will be readjusted        Josef Ramos MD  Vascular & General Neurology  Director, Multiple Sclerosis Program  [facility]  4/4/2019, Time completed 1:15 PM    Decision making:  (  ) labs reviewed/ordered - 1  (  )

## 2019-04-11 ENCOUNTER — TELEPHONE (OUTPATIENT)
Dept: CARDIOLOGY | Age: 69
End: 2019-04-11

## 2019-04-11 RX ORDER — ISOSORBIDE MONONITRATE 60 MG/1
60 TABLET, EXTENDED RELEASE ORAL 2 TIMES DAILY
COMMUNITY
End: 2019-04-11 | Stop reason: SDUPTHER

## 2019-04-11 RX ORDER — ISOSORBIDE MONONITRATE 60 MG/1
60 TABLET, EXTENDED RELEASE ORAL 2 TIMES DAILY
Qty: 180 TABLET | Refills: 3 | Status: SHIPPED | OUTPATIENT
Start: 2019-04-11 | End: 2019-10-11 | Stop reason: DRUGHIGH

## 2019-04-11 NOTE — TELEPHONE ENCOUNTER
Protocol failed due to Lipid panel within past 12 months      Please advise,    LOV:2/26/19 AS  FOV:6/4/19  LAST RX:4/10/18 80 mg take 1 tab daily 90 tabs 3 refills  LAST LABS: 12/18/18 cbc,cmp,sed rate,c-reactive  PER PROTOCOL: to provider

## 2019-04-12 RX ORDER — PRAVASTATIN SODIUM 80 MG/1
80 TABLET ORAL
Qty: 90 TABLET | Refills: 0 | Status: SHIPPED | OUTPATIENT
Start: 2019-04-12 | End: 2019-07-24

## 2019-04-15 ENCOUNTER — TELEPHONE (OUTPATIENT)
Dept: RHEUMATOLOGY | Facility: CLINIC | Age: 69
End: 2019-04-15

## 2019-04-15 DIAGNOSIS — M19.90 INFLAMMATORY ARTHRITIS: ICD-10-CM

## 2019-04-15 DIAGNOSIS — M79.641 BILATERAL HAND PAIN: ICD-10-CM

## 2019-04-15 DIAGNOSIS — M79.642 BILATERAL HAND PAIN: ICD-10-CM

## 2019-04-15 RX ORDER — PREDNISONE 1 MG/1
TABLET ORAL
Qty: 45 TABLET | Refills: 0 | Status: SHIPPED | OUTPATIENT
Start: 2019-04-15 | End: 2019-06-19

## 2019-05-07 ENCOUNTER — LAB ENCOUNTER (OUTPATIENT)
Dept: LAB | Facility: HOSPITAL | Age: 69
End: 2019-05-07
Attending: INTERNAL MEDICINE
Payer: MEDICARE

## 2019-05-07 DIAGNOSIS — M06.042 RHEUMATOID ARTHRITIS INVOLVING BOTH HANDS WITH NEGATIVE RHEUMATOID FACTOR (HCC): ICD-10-CM

## 2019-05-07 DIAGNOSIS — I10 ESSENTIAL HYPERTENSION: ICD-10-CM

## 2019-05-07 DIAGNOSIS — R39.12 BENIGN PROSTATIC HYPERPLASIA WITH WEAK URINARY STREAM: ICD-10-CM

## 2019-05-07 DIAGNOSIS — E78.00 ELEVATED CHOLESTEROL: ICD-10-CM

## 2019-05-07 DIAGNOSIS — N40.1 BENIGN PROSTATIC HYPERPLASIA WITH WEAK URINARY STREAM: ICD-10-CM

## 2019-05-07 DIAGNOSIS — M06.041 RHEUMATOID ARTHRITIS INVOLVING BOTH HANDS WITH NEGATIVE RHEUMATOID FACTOR (HCC): ICD-10-CM

## 2019-05-07 PROCEDURE — 80061 LIPID PANEL: CPT

## 2019-05-07 PROCEDURE — 36415 COLL VENOUS BLD VENIPUNCTURE: CPT

## 2019-05-07 PROCEDURE — 80053 COMPREHEN METABOLIC PANEL: CPT

## 2019-05-07 PROCEDURE — 84443 ASSAY THYROID STIM HORMONE: CPT

## 2019-05-07 PROCEDURE — 85025 COMPLETE CBC W/AUTO DIFF WBC: CPT

## 2019-05-09 ENCOUNTER — TELEPHONE (OUTPATIENT)
Dept: INTERNAL MEDICINE CLINIC | Facility: CLINIC | Age: 69
End: 2019-05-09

## 2019-05-09 NOTE — TELEPHONE ENCOUNTER
Pt would like to speak to the nurse re: his PSA results. He already spoke to Nurse [facility] and would like to speak to her again. No other info provided.  Please advise

## 2019-05-09 NOTE — TELEPHONE ENCOUNTER
Patient states he would like a Urologist, doesn't want to wait for Dr Ranjit Caldwell. Pt referred to Dr Yury Redman Urologist, info given. Pt verbalizes understanding.

## 2019-05-29 ENCOUNTER — TELEPHONE (OUTPATIENT)
Dept: CARDIOLOGY | Age: 69
End: 2019-05-29

## 2019-05-31 ENCOUNTER — TELEPHONE (OUTPATIENT)
Dept: CARDIOLOGY | Age: 69
End: 2019-05-31

## 2019-06-03 ENCOUNTER — TELEPHONE (OUTPATIENT)
Dept: CARDIOLOGY | Age: 69
End: 2019-06-03

## 2019-06-03 ENCOUNTER — MED REC SCAN ONLY (OUTPATIENT)
Dept: INTERNAL MEDICINE CLINIC | Facility: CLINIC | Age: 69
End: 2019-06-03

## 2019-06-06 ENCOUNTER — HOSPITAL (OUTPATIENT)
Dept: OTHER | Age: 69
End: 2019-06-06
Attending: INTERNAL MEDICINE

## 2019-06-06 PROCEDURE — 99214 OFFICE O/P EST MOD 30 MIN: CPT | Performed by: INTERNAL MEDICINE

## 2019-06-07 ENCOUNTER — TELEPHONE (OUTPATIENT)
Dept: CARDIOLOGY | Age: 69
End: 2019-06-07

## 2019-06-09 ENCOUNTER — TELEPHONE (OUTPATIENT)
Dept: CARDIOLOGY | Age: 69
End: 2019-06-09

## 2019-06-11 ENCOUNTER — DOCUMENTATION (OUTPATIENT)
Dept: CARDIOLOGY | Age: 69
End: 2019-06-11

## 2019-06-11 RX ORDER — HYDROXYCHLOROQUINE SULFATE 200 MG/1
200 TABLET, FILM COATED ORAL 2 TIMES DAILY
Qty: 180 TABLET | Refills: 3
Start: 2019-06-11 | End: 2019-10-11 | Stop reason: CLARIF

## 2019-06-14 ENCOUNTER — TELEPHONE (OUTPATIENT)
Dept: CARDIOLOGY | Age: 69
End: 2019-06-14

## 2019-06-14 RX ORDER — DEXLANSOPRAZOLE 60 MG/1
60 CAPSULE, DELAYED RELEASE ORAL DAILY
Qty: 30 CAPSULE | Refills: 11
Start: 2019-06-14 | End: 2020-12-10 | Stop reason: ALTCHOICE

## 2019-06-14 RX ORDER — NITROGLYCERIN 0.4 MG/1
0.4 TABLET SUBLINGUAL EVERY 5 MIN PRN
COMMUNITY
Start: 2017-10-11 | End: 2020-08-04

## 2019-06-19 ENCOUNTER — HOSPITAL ENCOUNTER (OUTPATIENT)
Facility: HOSPITAL | Age: 69
Setting detail: OBSERVATION
Discharge: HOME | End: 2019-06-21
Attending: EMERGENCY MEDICINE | Admitting: HOSPITALIST
Payer: MEDICARE

## 2019-06-19 ENCOUNTER — TELEPHONE (OUTPATIENT)
Dept: CARDIOLOGY | Age: 69
End: 2019-06-19

## 2019-06-19 ENCOUNTER — APPOINTMENT (OUTPATIENT)
Dept: CT IMAGING | Facility: HOSPITAL | Age: 69
End: 2019-06-19
Attending: EMERGENCY MEDICINE
Payer: MEDICARE

## 2019-06-19 ENCOUNTER — APPOINTMENT (OUTPATIENT)
Dept: GENERAL RADIOLOGY | Facility: HOSPITAL | Age: 69
End: 2019-06-19
Attending: EMERGENCY MEDICINE
Payer: MEDICARE

## 2019-06-19 DIAGNOSIS — R07.9 CHEST PAIN OF UNCERTAIN ETIOLOGY: Primary | ICD-10-CM
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
## 2019-06-19 DIAGNOSIS — J18.9 NOSOCOMIAL PNEUMONIA: ICD-10-CM

## 2019-06-19 PROBLEM — Z95.5 HISTORY OF CORONARY ARTERY STENT PLACEMENT: Status: ACTIVE | Noted: 2019-06-19

## 2019-06-19 LAB
ADENOVIRUS PCR:: NEGATIVE
ALBUMIN SERPL-MCNC: 3.4 G/DL (ref 3.4–5)
ALBUMIN/GLOB SERPL: 0.9 {RATIO} (ref 1–2)
ALP LIVER SERPL-CCNC: 79 U/L (ref 45–117)
ALT SERPL-CCNC: 25 U/L (ref 16–61)
ANION GAP SERPL CALC-SCNC: 6 MMOL/L (ref 0–18)
AST SERPL-CCNC: 17 U/L (ref 15–37)
ATRIAL RATE: 75 BPM
ATRIAL RATE: 78 BPM
B PERT DNA SPEC QL NAA+PROBE: NEGATIVE
BASOPHILS # BLD AUTO: 0.07 X10(3) UL (ref 0–0.2)
BASOPHILS NFR BLD AUTO: 0.9 %
BILIRUB SERPL-MCNC: 0.9 MG/DL (ref 0.1–2)
BUN BLD-MCNC: 19 MG/DL (ref 7–18)
BUN/CREAT SERPL: 16.7 (ref 10–20)
C PNEUM DNA SPEC QL NAA+PROBE: NEGATIVE
CALCIUM BLD-MCNC: 8.9 MG/DL (ref 8.5–10.1)
CHLORIDE SERPL-SCNC: 110 MMOL/L (ref 98–112)
CO2 SERPL-SCNC: 23 MMOL/L (ref 21–32)
CORONAVIRUS 229E PCR:: NEGATIVE
CORONAVIRUS HKU1 PCR:: NEGATIVE
CORONAVIRUS NL63 PCR:: NEGATIVE
CORONAVIRUS OC43 PCR:: NEGATIVE
CREAT BLD-MCNC: 1.14 MG/DL (ref 0.7–1.3)
DEPRECATED RDW RBC AUTO: 48.4 FL (ref 35.1–46.3)
EOSINOPHIL # BLD AUTO: 0.06 X10(3) UL (ref 0–0.7)
EOSINOPHIL NFR BLD AUTO: 0.8 %
ERYTHROCYTE [DISTWIDTH] IN BLOOD BY AUTOMATED COUNT: 14.6 % (ref 11–15)
FLUAV RNA SPEC QL NAA+PROBE: NEGATIVE
FLUBV RNA SPEC QL NAA+PROBE: NEGATIVE
GLOBULIN PLAS-MCNC: 3.6 G/DL (ref 2.8–4.4)
GLUCOSE BLD-MCNC: 89 MG/DL (ref 70–99)
HCT VFR BLD AUTO: 39.9 % (ref 39–53)
HGB BLD-MCNC: 13.2 G/DL (ref 13–17.5)
IMM GRANULOCYTES # BLD AUTO: 0.05 X10(3) UL (ref 0–1)
IMM GRANULOCYTES NFR BLD: 0.7 %
LYMPHOCYTES # BLD AUTO: 0.92 X10(3) UL (ref 1–4)
LYMPHOCYTES NFR BLD AUTO: 12.2 %
M PROTEIN MFR SERPL ELPH: 7 G/DL (ref 6.4–8.2)
MCH RBC QN AUTO: 29.8 PG (ref 26–34)
MCHC RBC AUTO-ENTMCNC: 33.1 G/DL (ref 31–37)
MCV RBC AUTO: 90.1 FL (ref 80–100)
METAPNEUMOVIRUS PCR:: NEGATIVE
MONOCYTES # BLD AUTO: 1.33 X10(3) UL (ref 0.1–1)
MONOCYTES NFR BLD AUTO: 17.7 %
MYCOPLASMA PNEUMONIA PCR:: NEGATIVE
NEUTROPHILS # BLD AUTO: 5.1 X10 (3) UL (ref 1.5–7.7)
NEUTROPHILS # BLD AUTO: 5.1 X10(3) UL (ref 1.5–7.7)
NEUTROPHILS NFR BLD AUTO: 67.7 %
OSMOLALITY SERPL CALC.SUM OF ELEC: 290 MOSM/KG (ref 275–295)
P AXIS: 22 DEGREES
P AXIS: 26 DEGREES
P-R INTERVAL: 184 MS
P-R INTERVAL: 194 MS
PARAINFLUENZA 1 PCR:: NEGATIVE
PARAINFLUENZA 2 PCR:: NEGATIVE
PARAINFLUENZA 3 PCR:: NEGATIVE
PARAINFLUENZA 4 PCR:: NEGATIVE
PLATELET # BLD AUTO: 162 10(3)UL (ref 150–450)
POTASSIUM SERPL-SCNC: 3.7 MMOL/L (ref 3.5–5.1)
Q-T INTERVAL: 392 MS
Q-T INTERVAL: 402 MS
QRS DURATION: 128 MS
QRS DURATION: 128 MS
QTC CALCULATION (BEZET): 446 MS
QTC CALCULATION (BEZET): 448 MS
R AXIS: 35 DEGREES
R AXIS: 51 DEGREES
RBC # BLD AUTO: 4.43 X10(6)UL (ref 3.8–5.8)
RHINOVIRUS/ENTERO PCR:: NEGATIVE
RSV RNA SPEC QL NAA+PROBE: NEGATIVE
SODIUM SERPL-SCNC: 139 MMOL/L (ref 136–145)
T AXIS: 5 DEGREES
T AXIS: 6 DEGREES
TROPONIN I SERPL-MCNC: <0.045 NG/ML (ref ?–0.04)
VENTRICULAR RATE: 75 BPM
VENTRICULAR RATE: 78 BPM
WBC # BLD AUTO: 7.5 X10(3) UL (ref 4–11)

## 2019-06-19 PROCEDURE — 71045 X-RAY EXAM CHEST 1 VIEW: CPT | Performed by: EMERGENCY MEDICINE

## 2019-06-19 PROCEDURE — 71275 CT ANGIOGRAPHY CHEST: CPT | Performed by: EMERGENCY MEDICINE

## 2019-06-19 PROCEDURE — 74175 CTA ABDOMEN W/CONTRAST: CPT | Performed by: EMERGENCY MEDICINE

## 2019-06-19 PROCEDURE — 99214 OFFICE O/P EST MOD 30 MIN: CPT | Performed by: INTERNAL MEDICINE

## 2019-06-19 PROCEDURE — 99219 INITIAL OBSERVATION CARE,LEVL II: CPT | Performed by: STUDENT IN AN ORGANIZED HEALTH CARE EDUCATION/TRAINING PROGRAM

## 2019-06-19 RX ORDER — ONDANSETRON 2 MG/ML
4 INJECTION INTRAMUSCULAR; INTRAVENOUS EVERY 6 HOURS PRN
Status: DISCONTINUED | OUTPATIENT
Start: 2019-06-19 | End: 2019-06-21

## 2019-06-19 RX ORDER — CLOPIDOGREL BISULFATE 75 MG/1
75 TABLET ORAL DAILY
Status: DISCONTINUED | OUTPATIENT
Start: 2019-06-20 | End: 2019-06-21

## 2019-06-19 RX ORDER — METOPROLOL SUCCINATE 50 MG/1
50 TABLET, EXTENDED RELEASE ORAL
Status: DISCONTINUED | OUTPATIENT
Start: 2019-06-19 | End: 2019-06-21

## 2019-06-19 RX ORDER — HEPARIN SODIUM 5000 [USP'U]/ML
5000 INJECTION, SOLUTION INTRAVENOUS; SUBCUTANEOUS EVERY 8 HOURS SCHEDULED
Status: DISCONTINUED | OUTPATIENT
Start: 2019-06-19 | End: 2019-06-21

## 2019-06-19 RX ORDER — CLONAZEPAM 1 MG/1
1 TABLET ORAL NIGHTLY PRN
Status: DISCONTINUED | OUTPATIENT
Start: 2019-06-19 | End: 2019-06-21

## 2019-06-19 RX ORDER — PANTOPRAZOLE SODIUM 20 MG/1
20 TABLET, DELAYED RELEASE ORAL
Status: DISCONTINUED | OUTPATIENT
Start: 2019-06-20 | End: 2019-06-21

## 2019-06-19 RX ORDER — AZITHROMYCIN 250 MG/1
500 TABLET, FILM COATED ORAL
Status: COMPLETED | OUTPATIENT
Start: 2019-06-20 | End: 2019-06-21

## 2019-06-19 RX ORDER — ACETAMINOPHEN 325 MG/1
650 TABLET ORAL EVERY 6 HOURS PRN
Status: DISCONTINUED | OUTPATIENT
Start: 2019-06-19 | End: 2019-06-21

## 2019-06-19 RX ORDER — TRAMADOL HYDROCHLORIDE 50 MG/1
50 TABLET ORAL EVERY 8 HOURS PRN
COMMUNITY

## 2019-06-19 RX ORDER — NITROGLYCERIN 0.4 MG/1
0.4 TABLET SUBLINGUAL EVERY 5 MIN PRN
Status: DISCONTINUED | OUTPATIENT
Start: 2019-06-19 | End: 2019-06-21

## 2019-06-19 RX ORDER — POTASSIUM CHLORIDE 20 MEQ/1
40 TABLET, EXTENDED RELEASE ORAL ONCE
Status: DISCONTINUED | OUTPATIENT
Start: 2019-06-19 | End: 2019-06-21

## 2019-06-19 RX ORDER — TRAMADOL HYDROCHLORIDE 50 MG/1
50 TABLET ORAL EVERY 8 HOURS PRN
Status: DISCONTINUED | OUTPATIENT
Start: 2019-06-19 | End: 2019-06-21

## 2019-06-19 RX ORDER — SODIUM CHLORIDE 9 MG/ML
INJECTION, SOLUTION INTRAVENOUS CONTINUOUS
Status: DISCONTINUED | OUTPATIENT
Start: 2019-06-19 | End: 2019-06-21

## 2019-06-19 RX ORDER — ATENOLOL 50 MG/1
50 TABLET ORAL
Status: DISCONTINUED | OUTPATIENT
Start: 2019-06-20 | End: 2019-06-19

## 2019-06-19 RX ORDER — ASPIRIN 325 MG
325 TABLET ORAL DAILY
Status: DISCONTINUED | OUTPATIENT
Start: 2019-06-19 | End: 2019-06-21

## 2019-06-19 RX ORDER — PRAVASTATIN SODIUM 20 MG
20 TABLET ORAL NIGHTLY
Status: DISCONTINUED | OUTPATIENT
Start: 2019-06-19 | End: 2019-06-21 | Stop reason: DRUGHIGH

## 2019-06-19 RX ORDER — METOCLOPRAMIDE HYDROCHLORIDE 5 MG/ML
10 INJECTION INTRAMUSCULAR; INTRAVENOUS EVERY 8 HOURS PRN
Status: DISCONTINUED | OUTPATIENT
Start: 2019-06-19 | End: 2019-06-21

## 2019-06-19 NOTE — HISTORICAL OFFICE NOTE
Britton Snellen  : 10/17/1950  ACCOUNT:  [id number]  [id number]  PCP: Dr. Swathi Moreno     TODAY'S DATE: 2018  DICTATED BY:  ASHU Cook]      CHIEF COMPLAINT: [Followup of Chest pain.]    HPI:    [On 2018, Rolan Diaz, a 75 year catheterization August 2004, Diag.  branch distal SVG graft 5/12/17, dyslipidemia, PTCA/Stent, PTCA/Stent August 2004, PTCA/Stent July 2010, redo CABG 4/95: seq SVG to RPV, RCA; CABG 5/89: LIMA to LAD, SVG to Diag, taxus drug eluting stents of third obtuse interventions in May 2017. Discussed with patient regarding his symptoms again he felt it was very similar to what he had prior to his interventions. He denies any symptoms today.   Patient has not had any recent ischemic workup in terms of stress testing 325MG     1 TABLET DAILY AS DIRECTED. Jeferson Bergman  [phone]  : 10/17/1950  ACCOUNT:  [id number]  PCP: Naga Kinney M.D. CARDIOLOGIST: Robert Beaulieu M.D.   Hospital:  [facility]   TODAY'S DATE:  2017     DISCHARGE SUMMARY

## 2019-06-19 NOTE — PLAN OF CARE
NURSING ADMISSION NOTE       Patient admitted via cart   Oriented to room. Safety precautions initiated. Bed in low position. Call light in reach.   Updated history in the ER and gave report to CTU RN

## 2019-06-19 NOTE — ED INITIAL ASSESSMENT (HPI)
Per patient, chest pain and shortness of breath around 8 am today. Has been intermittent for several weeks. Reports MI 2 weeks without intervention at ER in [facility]. Patient describes pain as something that is \"pulling apart in middle chest and back\".  R

## 2019-06-19 NOTE — H&P
[facility] HOSPITALIST  History and Physical     Stephani Barillas.  Patient Status:  Emergency    10/17/1950 MRN [id number]   Location [address] Attending Ryne Turner, [facility] Se Day # 0 PCP Alhaji De La Cruz MD     Chief C Performed by Khalida Mckeon MD at [facility]   • TRANSFORAMINAL EPIDURAL - LUMBAR N/A 7/23/2012    Performed by Khalida Mckeon MD at [facility]   • TRANSFORAMINAL EPIDURAL - LUMBAR N/A 6/29/2012    Performed by Lorena Valdez Hydroxychloroquine Sulfate (PLAQUENIL) 200 MG Oral Tab Take once nightly for one week then increase two tablets nightly. Disp: 60 tablet Rfl: 2   Dicyclomine HCl 10 MG Oral Cap Take 1 capsule (10 mg total) by mouth daily as needed.  Disp: 90 capsule Rfl: GNR  1. Pip tazo/ azithro  2. Flu panel   3. H/o CVA  4.  H/o CAD    Quality:  · DVT Prophylaxis: Heparin   · CODE status: full  · Rosenthal: no    Plan of care discussed with pt, pt family    Bishop Adrianne MD  6/19/2019

## 2019-06-19 NOTE — CONSULTS
[facility]  Cardiology Consultation    Ish Goldsmith.  Patient Status:  Observation    10/17/1950 MRN [id number]   [facility] 2NE-A Attending Maren Marrero MD   Hosp Day # 0 PCP Cristofer Booth MD     Reason for Consultation:  C Laterality Date   • ANGIOGRAM     • ANGIOPLASTY (CORONARY)      2014   • ANGIOPLASTY (CORONARY)      11/2015   • ANGIOPLASTY (CORONARY)  1/12/16    PTCA-right Posterior   • CABG      1988, 1994   • CATARACT     • CATH BARE METAL STENT (BMS)     • COLONOSCO Q8H [facility]  •  ondansetron HCl (ZOFRAN) injection 4 mg, 4 mg, Intravenous, Q6H PRN  •  Metoclopramide HCl (REGLAN) injection 10 mg, 10 mg, Intravenous, Q8H PRN  •  0.9% NaCl infusion, , Intravenous, Continuous  •  aspirin tab 325 mg, 325 mg, Oral, Daily  •  c 06/19/2019    BUN 19 06/19/2019     06/19/2019    K 3.7 06/19/2019     06/19/2019    CO2 23.0 06/19/2019    GLU 89 06/19/2019    CA 8.9 06/19/2019    ALB 3.4 06/19/2019    ALKPHO 79 06/19/2019    BILT 0.9 06/19/2019    TP 7.0 06/19/2019    AST

## 2019-06-19 NOTE — ED PROVIDER NOTES
Patient Seen in: [facility] Emergency Department    History   Patient presents with:  Chest Pain Angina (cardiovascular)    Stated Complaint: Chest pain    HPI    Patient is a 76-year-old male comes emergency room for evaluation of chest pain.   Radha 11/2015   • ANGIOPLASTY (CORONARY)  1/12/16    PTCA-right Posterior   • CABG      1988, 1994   • CATARACT     • CATH BARE METAL STENT (BMS)     • COLONOSCOPY     • COLONOSCOPY     • ESOPHAGOGASTRODUODENOSCOPY (EGD) N/A 3/8/2016    Performed by Shannan Zafar, Resp 20   Wt 90.7 kg   SpO2 96%   BMI 32.28 kg/m²         Physical Exam    GENERAL: No acute distress, well appearing and non-toxic, Alert and oriented X 3   HEENT: Normocephalic, atraumatic. Moist mucous membranes.   Pupils equal round reactive to light a RAINBOW DRAW GOLD     EKG    Rate, intervals and axes as noted on EKG Report. Rate: 78  Rhythm: Sinus Rhythm  Reading: Right bundle branch block without acute changes.   Patient has slight ST elevation in lead III which is old when compared to prior EKG presents with chest pain for one month and getting worse with cough and shortness of breath.    CONTRAST USED:  100cc of Omnipaque 350  FINDINGS:   CHEST: LUNGS:  Multifocal irregularly opacities in anterior right upper lobe with associated bronchial wall t 6/19/19 2196)       MDM   Patient is a 76-year-old male comes emergency room for evaluation of chest pain. Intermittent chest pain over the course last 4 weeks. EKG x2 unchanged. Patient has no evidence for dissection, pulmonary embolism.   Patient does

## 2019-06-20 ENCOUNTER — APPOINTMENT (OUTPATIENT)
Dept: CV DIAGNOSTICS | Facility: HOSPITAL | Age: 69
End: 2019-06-20
Attending: INTERNAL MEDICINE
Payer: MEDICARE

## 2019-06-20 LAB
ANION GAP SERPL CALC-SCNC: 7 MMOL/L (ref 0–18)
BASOPHILS # BLD AUTO: 0.04 X10(3) UL (ref 0–0.2)
BASOPHILS NFR BLD AUTO: 0.6 %
BUN BLD-MCNC: 15 MG/DL (ref 7–18)
BUN/CREAT SERPL: 13.6 (ref 10–20)
CALCIUM BLD-MCNC: 8.5 MG/DL (ref 8.5–10.1)
CHLORIDE SERPL-SCNC: 109 MMOL/L (ref 98–112)
CHOLEST SMN-MCNC: 113 MG/DL (ref ?–200)
CO2 SERPL-SCNC: 23 MMOL/L (ref 21–32)
CREAT BLD-MCNC: 1.1 MG/DL (ref 0.7–1.3)
DEPRECATED RDW RBC AUTO: 48.7 FL (ref 35.1–46.3)
EOSINOPHIL # BLD AUTO: 0.12 X10(3) UL (ref 0–0.7)
EOSINOPHIL NFR BLD AUTO: 1.8 %
ERYTHROCYTE [DISTWIDTH] IN BLOOD BY AUTOMATED COUNT: 14.8 % (ref 11–15)
GLUCOSE BLD-MCNC: 90 MG/DL (ref 70–99)
HCT VFR BLD AUTO: 40.1 % (ref 39–53)
HDLC SERPL-MCNC: 35 MG/DL (ref 40–59)
HGB BLD-MCNC: 13.3 G/DL (ref 13–17.5)
IMM GRANULOCYTES # BLD AUTO: 0.06 X10(3) UL (ref 0–1)
IMM GRANULOCYTES NFR BLD: 0.9 %
LDLC SERPL CALC-MCNC: 59 MG/DL (ref ?–100)
LYMPHOCYTES # BLD AUTO: 0.93 X10(3) UL (ref 1–4)
LYMPHOCYTES NFR BLD AUTO: 14.2 %
MCH RBC QN AUTO: 29.6 PG (ref 26–34)
MCHC RBC AUTO-ENTMCNC: 33.2 G/DL (ref 31–37)
MCV RBC AUTO: 89.3 FL (ref 80–100)
MONOCYTES # BLD AUTO: 1.48 X10(3) UL (ref 0.1–1)
MONOCYTES NFR BLD AUTO: 22.5 %
NEUTROPHILS # BLD AUTO: 3.94 X10 (3) UL (ref 1.5–7.7)
NEUTROPHILS # BLD AUTO: 3.94 X10(3) UL (ref 1.5–7.7)
NEUTROPHILS NFR BLD AUTO: 60 %
NONHDLC SERPL-MCNC: 78 MG/DL (ref ?–130)
OSMOLALITY SERPL CALC.SUM OF ELEC: 288 MOSM/KG (ref 275–295)
PLATELET # BLD AUTO: 164 10(3)UL (ref 150–450)
POTASSIUM SERPL-SCNC: 3.8 MMOL/L (ref 3.5–5.1)
RBC # BLD AUTO: 4.49 X10(6)UL (ref 3.8–5.8)
SODIUM SERPL-SCNC: 139 MMOL/L (ref 136–145)
TRIGL SERPL-MCNC: 96 MG/DL (ref 30–149)
TROPONIN I SERPL-MCNC: <0.045 NG/ML (ref ?–0.04)
TSI SER-ACNC: 1.48 MIU/ML (ref 0.36–3.74)
VLDLC SERPL CALC-MCNC: 19 MG/DL (ref 0–30)
WBC # BLD AUTO: 6.6 X10(3) UL (ref 4–11)

## 2019-06-20 PROCEDURE — 93306 TTE W/DOPPLER COMPLETE: CPT | Performed by: INTERNAL MEDICINE

## 2019-06-20 PROCEDURE — 99215 OFFICE O/P EST HI 40 MIN: CPT | Performed by: INTERNAL MEDICINE

## 2019-06-20 PROCEDURE — 99225 SUBSEQUENT OBSERVATION CARE: CPT | Performed by: HOSPITALIST

## 2019-06-20 RX ORDER — POTASSIUM CHLORIDE 20 MEQ/1
40 TABLET, EXTENDED RELEASE ORAL ONCE
Status: COMPLETED | OUTPATIENT
Start: 2019-06-20 | End: 2019-06-20

## 2019-06-20 RX ORDER — TERAZOSIN 5 MG/1
10 CAPSULE ORAL NIGHTLY
Status: DISCONTINUED | OUTPATIENT
Start: 2019-06-20 | End: 2019-06-21

## 2019-06-20 NOTE — PLAN OF CARE
Pt A&O x4, forgetful, SPO2 95% on RA, sinus rhythm with R BBB on tele, up with standby assist. Maintained on IV antibiotics for pneumonia, no adverse reaction noted at this time. Potassium replaced, getting IVF. Echocardiogram results pending.  Plan for [facility] - ADULT  Goal: Achieves optimal ventilation and oxygenation  Description  INTERVENTIONS:  - Assess for changes in respiratory status  - Assess for changes in mentation and behavior  - Position to facilitate oxygenation and minimize respiratory effort  - Ox

## 2019-06-20 NOTE — PROGRESS NOTES
[place] HOSPITALIST  Progress Note     Lashaun Duval.  Patient Status:  Observation    10/17/1950 MRN [id number]   [facility] 2NE-A Attending Amina Villa [address] Day # 0 PCP Prakash Cardoza MD     Chief Complaint: chest pa Component Value Date    TSH 1.480 06/20/2019       Imaging: Imaging data reviewed in Epic.     Medications:   • piperacillin-tazobactam  3.375 g Intravenous Q8H   • Heparin Sodium (Porcine)  5,000 Units Subcutaneous Q8H [facility]   • aspirin  325 mg Oral Daily

## 2019-06-20 NOTE — PLAN OF CARE
Assumed care of pt @ 1900. Pt AOx4, mildly forgetful. Pt answers questions appropriately, having trouble remembering his home phone number to call his wife. States that he has noticed some problems remembering phone numbers lately.  C/o SOB, O2 sats 94-96%

## 2019-06-20 NOTE — PROGRESS NOTES
[facility]  Cardiology Progress Note    Subjective:  No chest pain or shortness of breath. Did have nausea today, which improved with Zofran. Able to hold food down. Told him to stay NPO after midnight for LHC with Dr. Davion Murdock tomorrow.     Objective: ASHU  6/20/2019  4:30 PM    Patient seen and examined independently.  Note reviewed and labs reviewed.  Agree with above assessment and plan as ammended.   No further angina; in setting of recurrent episodes will plan for LHC in AM.

## 2019-06-20 NOTE — PLAN OF CARE
Assumed patient care at 2300  Patient A&O x 4- forgetful at times  No current complaints of chest pain  VSS  Tele-SR  (+) PNA- IV zosyn and zithro  Heparin Sub Q  Plan for Echo 6/20  PT/OT consult  Cath on Friday with Dr. Kiah Alvarez  Patient currently resting i Problem: RESPIRATORY - ADULT  Goal: Achieves optimal ventilation and oxygenation  Description  INTERVENTIONS:  - Assess for changes in respiratory status  - Assess for changes in mentation and behavior  - Position to facilitate oxygenation and minimize r

## 2019-06-21 VITALS
SYSTOLIC BLOOD PRESSURE: 119 MMHG | WEIGHT: 203.94 LBS | BODY MASS INDEX: 33 KG/M2 | HEART RATE: 64 BPM | RESPIRATION RATE: 16 BRPM | OXYGEN SATURATION: 95 % | DIASTOLIC BLOOD PRESSURE: 67 MMHG | TEMPERATURE: 97 F

## 2019-06-21 LAB — POTASSIUM SERPL-SCNC: 4.2 MMOL/L (ref 3.5–5.1)

## 2019-06-21 PROCEDURE — 99214 OFFICE O/P EST MOD 30 MIN: CPT | Performed by: INTERNAL MEDICINE

## 2019-06-21 PROCEDURE — 99217 OBSERVATION CARE DISCHARGE: CPT | Performed by: HOSPITALIST

## 2019-06-21 RX ORDER — AMOXICILLIN AND CLAVULANATE POTASSIUM 875; 125 MG/1; MG/1
1 TABLET, FILM COATED ORAL 2 TIMES DAILY
Qty: 20 TABLET | Refills: 0 | Status: SHIPPED | OUTPATIENT
Start: 2019-06-21 | End: 2019-07-01

## 2019-06-21 RX ORDER — ISOSORBIDE MONONITRATE 60 MG/1
60 TABLET, EXTENDED RELEASE ORAL DAILY
Status: DISCONTINUED | OUTPATIENT
Start: 2019-06-21 | End: 2019-06-21

## 2019-06-21 RX ORDER — LOPERAMIDE HYDROCHLORIDE 2 MG/1
2 CAPSULE ORAL ONCE
Status: COMPLETED | OUTPATIENT
Start: 2019-06-21 | End: 2019-06-21

## 2019-06-21 RX ORDER — PRAVASTATIN SODIUM 20 MG
80 TABLET ORAL NIGHTLY
Status: DISCONTINUED | OUTPATIENT
Start: 2019-06-21 | End: 2019-06-21

## 2019-06-21 RX ORDER — METOPROLOL SUCCINATE 50 MG/1
50 TABLET, EXTENDED RELEASE ORAL
Qty: 60 TABLET | Refills: 3 | Status: SHIPPED | OUTPATIENT
Start: 2019-06-21 | End: 2022-01-21 | Stop reason: CLARIF

## 2019-06-21 NOTE — PROGRESS NOTES
[facility]  Angiogram Procedure Note           Christopher Lowery.  Location: Cath Lab    [facility] [id number] MRN [id number]   Admission Date 4/4/2018 Procedure Date 4/4/2018   Attending Physician Eloisa Funes MD Procedure Physician Claire Willson MD     Hemodynamics: Normal hemodynamics           Selective Coronary Angiography Findings:  1. The right coronary artery is 100% occluded in the proximal segment  2. The saphenous vein graft to the right coronary artery is patent.   There is excellent flow down

## 2019-06-21 NOTE — PLAN OF CARE
Tele monitoring. I/o. Npo after midnight for possible angiogram this am. Pre procedure shave to bilateral groin sites. Pt forgetful at night. Bed alarm on safety. Iv fluid with Iv antibiotics.      Problem: Patient/Family Goals  Goal: Patient/Family Long Te status  - Assess for changes in mentation and behavior  - Position to facilitate oxygenation and minimize respiratory effort  - Oxygen supplementation based on oxygen saturation or ABGs  - Provide Smoking Cessation handout, if applicable  - Encourage bron

## 2019-06-21 NOTE — CM/SW NOTE
06/21/19 1600   CM/SW Referral Data   Referral Source Social Work (self-referral)   Reason for Referral Discharge planning   Informant Patient   Patient Info   Patient's Mental Status Alert;Oriented   Patient's Home Environment House   Patient lives wit

## 2019-06-21 NOTE — PROGRESS NOTES
[facility] HOSPITALIST  Progress Note     Radha Haro.  Patient Status:  Observation    10/17/1950 MRN [id number]   [facility] 2NE-A Attending Milton Zambrano [address] Day # 0 PCP Mary Hsieh MD     Chief Complaint: chest pa hours. Recent Labs   Lab 06/19/19  1057 06/20/19  0638   TROP <0.045 <0.045            Imaging: Imaging data reviewed in Epic.     Medications:   • Terazosin HCl  10 mg Oral Nightly   • piperacillin-tazobactam  3.375 g Intravenous Q8H   • Heparin Sodium

## 2019-06-21 NOTE — HOME CARE LIAISON
Received referral from Casey Brennan. Met with patient at the bedside and he declined home health services. Patient stated that his wife is on HD and would be interested in services.  I instructed the patient to reach out to his spouses' PCP for her home health

## 2019-06-21 NOTE — PLAN OF CARE
D: Patient received orders for discharge    A: Reviewed discharge instructions, including stress test to be scheduled and follow up with Dr Nay Vargas after; reviewed discharge medications, prescriptions faxed per patient request to patient's pharmacy; patient

## 2019-06-21 NOTE — PLAN OF CARE
Patient alert and oriented, forgetful at times, speech is slow; VSS; cardiac monitor showing SR/SB, lowest HR 45 this morning; lung sounds clear, on room air, no cough noted; no edema; continent of bowel and bladder with loose stool x1 early this morning;

## 2019-06-21 NOTE — PROGRESS NOTES
[facility]  Cardiology Progress Note    Subjective:  No chest pain or shortness of breath. No recurrence of nausea.      Objective:  /73 (BP Location: Right arm)   Pulse 61   Temp 97.4 °F (36.3 °C) (Oral)   Resp 16   Wt 203 lb 14.8 oz (92.5 kg) therapy and considering angio only if refractory symptoms recur or lexiscan is abnormal.  See cath report from 4/18 below:  [place] Procedure Note  [facility].  Location: Cath Lab    [facility] [id number] MRN [id number]   Ad oxygen saturation and heart rate was monitored throughout the case from 10:22 AM to 11:09 AM     Hemodynamics: Normal hemodynamics           Selective Coronary Angiography Findings:  1.  The right coronary artery is 100% occluded in the proximal segment  2

## 2019-06-21 NOTE — PHYSICAL THERAPY NOTE
PHYSICAL THERAPY EVALUATION - INPATIENT     Room Number: 2621/2621-A  Evaluation Date: 6/21/2019  Type of Evaluation: Initial  Physician Order: PT Eval and Treat    Presenting Problem: angina, RUL PNA  Reason for Therapy: Mobility Dysfunction and Dis Khalida Mckeon MD at [facility]   • TRANSFORAMINAL EPIDURAL - LUMBAR Right 4/14/2014    Performed by Khalida Mckeon MD at [facility]   • TRANSFORAMINAL EPIDURAL - LUMBAR N/A 7/23/2012    Performed by Khalida Mckeon difficulty does the patient currently have. ..  -   Turning over in bed (including adjusting bedclothes, sheets and blankets)?: None   -   Sitting down on and standing up from a chair with arms (e.g., wheelchair, bedside commode, etc.): A Little   -   Movin angina, RUL PNA. Pertinent comorbidities and personal factors impacting therapy include CVA, CAD, CABG, RA, MI.   In this PT evaluation, the patient presents with the following impairments decreased static and dynamic balance, decreased safety, decreased i

## 2019-06-24 ENCOUNTER — PATIENT OUTREACH (OUTPATIENT)
Dept: CASE MANAGEMENT | Age: 69
End: 2019-06-24

## 2019-06-24 DIAGNOSIS — R07.9 CHEST PAIN OF UNCERTAIN ETIOLOGY: ICD-10-CM

## 2019-06-24 DIAGNOSIS — Z02.9 ENCOUNTERS FOR UNSPECIFIED ADMINISTRATIVE PURPOSE: ICD-10-CM

## 2019-06-24 PROCEDURE — 1111F DSCHRG MED/CURRENT MED MERGE: CPT

## 2019-06-24 NOTE — DISCHARGE SUMMARY
HE[facility] HOSPITALIST  DISCHARGE SUMMARY     Mariana Robert.  Patient Status:  Observation    10/17/1950 MRN [id number]   [facility] 2NE-A Attending No att. providers found   Hosp Day # 0 PCP Echo Thomson MD     Date of Admission:  during hospitalization:   • NOne    Incidental or significant findings and recommendations (brief descriptions):  • None    Lab/Test results pending at Discharge:   · NOne    Consultants:  • Cardiology-Dr. Ian Olvera    Discharge Medication List:     Courtney Conti Sodium 80 MG Tabs  Commonly known as:  PRAVACHOL      Take 1 tablet (80 mg total) by mouth once daily.    Quantity:  90 tablet  Refills:  0     traMADol HCl 50 MG Tabs  Commonly known as:  ULTRAM      Take 50 mg by mouth every 8 (eight) hours as needed for 30 minutes

## 2019-06-25 ENCOUNTER — TELEPHONE (OUTPATIENT)
Dept: CARDIOLOGY | Age: 69
End: 2019-06-25

## 2019-06-25 ENCOUNTER — TELEPHONE (OUTPATIENT)
Dept: INTERNAL MEDICINE CLINIC | Facility: CLINIC | Age: 69
End: 2019-06-25

## 2019-06-25 NOTE — TELEPHONE ENCOUNTER
NCM spoke with pt and spouse for TCM. Pt is high risk for readmission and would benefit from a TCM HFU be recommended appt date of 6/28/19. Please f/up with pt and try to schedule as pt would greatly benefit. Thank you!     Pt and spouse requesting appts

## 2019-06-25 NOTE — PROGRESS NOTES
Initial Post Discharge Follow Up   Discharge Date: 6/21/19  Contact Date: 6/24/2019    Consent Verification:  Assessment Completed With: Patient and spouse  HIPAA Verified?   yes    Discharge Dx:     Chest pain  RUL pneumonia- suspect HCAP  H/o CVA  H/o (80 mg total) by mouth once daily. Disp: 90 tablet Rfl: 0   lansoprazole 30 MG Oral Capsule Delayed Release Take 1 capsule (30 mg total) by mouth daily.  Disp: 90 capsule Rfl: 1   Hydroxychloroquine Sulfate (PLAQUENIL) 200 MG Oral Tab Take once nightly for Now that you are home, are there any needs or concerns you need addressed before your next visit with your PCP?  (DME, meds, disease concerns, Etc): No     Follow up appointments:      Your appointments     Date & Time Appointment Department (Center) reason as to why you cannot make your appointments? No     NCM Reviewed upcoming Specialist Appt with patient     Yes  Overall Rating:    How would you rate the care you received while in the hospital? Good     Interventions by NCM: AVS summary reviewed w

## 2019-07-02 ENCOUNTER — HOSPITAL ENCOUNTER (OUTPATIENT)
Dept: CV DIAGNOSTICS | Facility: HOSPITAL | Age: 69
Discharge: HOME | End: 2019-07-02
Attending: INTERNAL MEDICINE
Payer: MEDICARE

## 2019-07-02 DIAGNOSIS — R07.9 CHEST PAIN OF UNCERTAIN ETIOLOGY: ICD-10-CM

## 2019-07-02 PROCEDURE — 93018 CV STRESS TEST I&R ONLY: CPT | Performed by: INTERNAL MEDICINE

## 2019-07-02 PROCEDURE — 78452 HT MUSCLE IMAGE SPECT MULT: CPT | Performed by: INTERNAL MEDICINE

## 2019-07-02 PROCEDURE — 93017 CV STRESS TEST TRACING ONLY: CPT | Performed by: INTERNAL MEDICINE

## 2019-07-03 NOTE — TELEPHONE ENCOUNTER
Called and spoke to pt, scheduled TCM HFU for 7/23 with AS. Nothing available sooner as schedule is limited by pt's wife (she has dialysis MWF and is only available T and Th). She confirmed pt's appt.

## 2019-07-05 ENCOUNTER — TELEPHONE (OUTPATIENT)
Dept: CARDIOLOGY | Age: 69
End: 2019-07-05

## 2019-07-23 ENCOUNTER — TELEPHONE (OUTPATIENT)
Dept: INTERNAL MEDICINE CLINIC | Facility: CLINIC | Age: 69
End: 2019-07-23

## 2019-07-23 ENCOUNTER — OFFICE VISIT (OUTPATIENT)
Dept: RHEUMATOLOGY | Facility: CLINIC | Age: 69
End: 2019-07-23
Payer: MEDICARE

## 2019-07-23 ENCOUNTER — APPOINTMENT (OUTPATIENT)
Dept: LAB | Age: 69
End: 2019-07-23
Attending: INTERNAL MEDICINE
Payer: MEDICARE

## 2019-07-23 VITALS
WEIGHT: 203 LBS | SYSTOLIC BLOOD PRESSURE: 119 MMHG | HEART RATE: 69 BPM | DIASTOLIC BLOOD PRESSURE: 73 MMHG | HEIGHT: 66 IN | TEMPERATURE: 98 F | BODY MASS INDEX: 32.62 KG/M2

## 2019-07-23 DIAGNOSIS — M79.641 BILATERAL HAND PAIN: ICD-10-CM

## 2019-07-23 DIAGNOSIS — M19.049 CMC ARTHRITIS: ICD-10-CM

## 2019-07-23 DIAGNOSIS — Z79.899 HIGH RISK MEDICATION USE: ICD-10-CM

## 2019-07-23 DIAGNOSIS — M79.642 BILATERAL HAND PAIN: ICD-10-CM

## 2019-07-23 DIAGNOSIS — M15.9 PRIMARY OSTEOARTHRITIS INVOLVING MULTIPLE JOINTS: ICD-10-CM

## 2019-07-23 DIAGNOSIS — M19.90 INFLAMMATORY ARTHRITIS: ICD-10-CM

## 2019-07-23 DIAGNOSIS — R76.8 CYCLIC CITRULLINATED PEPTIDE (CCP) ANTIBODY POSITIVE: ICD-10-CM

## 2019-07-23 DIAGNOSIS — M19.90 INFLAMMATORY ARTHRITIS: Primary | ICD-10-CM

## 2019-07-23 DIAGNOSIS — R76.8 POSITIVE ANTI-CCP TEST: ICD-10-CM

## 2019-07-23 LAB
CRP SERPL-MCNC: 1.14 MG/DL (ref ?–0.3)
RHEUMATOID FACT SERPL-ACNC: <10 IU/ML (ref ?–15)
SED RATE-ML: 19 MM/HR (ref 0–12)

## 2019-07-23 PROCEDURE — 86038 ANTINUCLEAR ANTIBODIES: CPT

## 2019-07-23 PROCEDURE — 86140 C-REACTIVE PROTEIN: CPT

## 2019-07-23 PROCEDURE — 86431 RHEUMATOID FACTOR QUANT: CPT

## 2019-07-23 PROCEDURE — 99214 OFFICE O/P EST MOD 30 MIN: CPT | Performed by: INTERNAL MEDICINE

## 2019-07-23 PROCEDURE — 86200 CCP ANTIBODY: CPT

## 2019-07-23 PROCEDURE — 85652 RBC SED RATE AUTOMATED: CPT

## 2019-07-23 RX ORDER — HYDROXYCHLOROQUINE SULFATE 200 MG/1
200 TABLET, FILM COATED ORAL 2 TIMES DAILY
Qty: 60 TABLET | Refills: 2 | Status: SHIPPED | OUTPATIENT
Start: 2019-07-23 | End: 2019-08-06

## 2019-07-23 RX ORDER — PREDNISONE 2.5 MG
2.5 TABLET ORAL DAILY
Qty: 30 TABLET | Refills: 1 | Status: SHIPPED | OUTPATIENT
Start: 2019-07-23 | End: 2019-09-12 | Stop reason: ALTCHOICE

## 2019-07-23 NOTE — PATIENT INSTRUCTIONS
-- continue plaquenil 200mg twice daily  -- be sure to see ophthalmologist whenever they recommend to monitor for any toxicities from the medication  -- start low dose prednisone 2.5mg daily for next one month  -- call me and update me in one month on how

## 2019-07-23 NOTE — TELEPHONE ENCOUNTER
Pt does not want to come for U 7-23-19. Has 8-6-19 appt instead. Is aware he will be called to reschedule.

## 2019-07-23 NOTE — PROGRESS NOTES
RHEUMATOLOGY Follow up   Date of visit: 7/23/2019  ? Patient presents with:  Osteoarthritis: Patient here for 5 month follow-up. Patient was in the hospital for chest pain about 3 weeks ago.  Imaging done while in hospital, everything came back normal. twice daily  -- be sure to see ophthalmologist whenever they recommend to monitor for any toxicities from the medication  -- start low dose prednisone 2.5mg daily for next one month  -- he will call me and update me in one month on how you're feeling  -- g osteoarthritis involving multiple joints    CMC arthritis    Cyclic citrullinated peptide (CCP) antibody positive    High risk medication use        Return in about 3 months (around 10/23/2019). ?   HPI   Odom Began. is a 70year old male with th pain at all. Has significant weakness and inability to grasp objects due to the pain and inability to make a fist. Does admit to some shooting, burning pain in his thumb when bending the fingers/making a fist. Describes pain as constant.  States severity wa painful shin bruises, Achilles heel pain or symptoms of enthesitis, psoriatic lesions, spooning or pitting of the nails, history of dactylitis. There are no symptoms of severe dry eyes, dry mouth, swelling of the cheeks or under the jawbone.   Denies any TRANSFORAMINAL EPIDURAL - LUMBAR N/A 7/23/2012    Performed by Beni Dwyer MD at [address]   • TRANSFORAMINAL EPIDURAL - LUMBAR N/A 6/29/2012    Performed by Beni Dwyer MD at [address]     Family History: 100 tablet Rfl: 0   Doxazosin Mesylate 8 MG Oral Tab Take 8 mg by mouth daily. ~ 1/2 hour after dinner Disp:  Rfl: 2   aspirin 325 MG Oral Tab EC Take 325 mg by mouth daily.  Disp:  Rfl:    Clopidogrel Bisulfate (PLAVIX) 75 MG Oral Tab Take 75 mg by mouth d kg/m². Body surface area is 2.01 meters squared. Pain Score: 7 - (Severe)       Physical Exam   Constitutional: He is oriented to person, place, and time. He appears well-developed and well-nourished. No distress. HENT:   Head: Normocephalic.    Right Ear transcribed by Technologist)  Patient has been experiencing bilateral hand pain ever since he fell one month ago. He was walking out of a restaurant when he fell down onto outstretched hands.  Majority of his pain in his right   and left hand is within his EOABSO 0.12 06/20/2019    BAABSO 0.04 06/20/2019     Lab Results   Component Value Date    GLU 90 06/20/2019    BUN 15 06/20/2019    BUNCREA 13.6 06/20/2019    CREATSERUM 1.10 06/20/2019    ANIONGAP 7 06/20/2019    GFR 78 05/13/2017    GFRNAA 68 06/20/2

## 2019-07-24 ENCOUNTER — TELEPHONE (OUTPATIENT)
Dept: RHEUMATOLOGY | Facility: CLINIC | Age: 69
End: 2019-07-24

## 2019-07-24 DIAGNOSIS — G89.4 CHRONIC PAIN SYNDROME: ICD-10-CM

## 2019-07-24 DIAGNOSIS — M79.642 BILATERAL HAND PAIN: ICD-10-CM

## 2019-07-24 DIAGNOSIS — M79.641 BILATERAL HAND PAIN: ICD-10-CM

## 2019-07-24 DIAGNOSIS — M19.049 CMC ARTHRITIS: ICD-10-CM

## 2019-07-24 DIAGNOSIS — M15.9 PRIMARY OSTEOARTHRITIS INVOLVING MULTIPLE JOINTS: ICD-10-CM

## 2019-07-24 RX ORDER — PRAVASTATIN SODIUM 80 MG/1
TABLET ORAL
Qty: 90 TABLET | Refills: 0 | Status: SHIPPED | OUTPATIENT
Start: 2019-07-24 | End: 2019-08-06

## 2019-07-24 RX ORDER — DULOXETIN HYDROCHLORIDE 30 MG/1
30 CAPSULE, DELAYED RELEASE ORAL DAILY
Qty: 30 CAPSULE | Refills: 1 | Status: SHIPPED | OUTPATIENT
Start: 2019-07-24 | End: 2019-08-06

## 2019-07-24 NOTE — TELEPHONE ENCOUNTER
Informed patient labs are grossly normal, mild elevation of inflammatory marker but RF is negative, Still waiting for CCP. He can try the cymbalta and we will see how he does with the low dose over the next few months.  Reminded patient to take about one ho

## 2019-07-25 LAB — CYCLIC CITRULLINATED PEPTIDE: 49 UNITS

## 2019-07-26 LAB — ANA SCREEN: NEGATIVE

## 2019-07-30 ENCOUNTER — MED REC SCAN ONLY (OUTPATIENT)
Dept: INTERNAL MEDICINE CLINIC | Facility: CLINIC | Age: 69
End: 2019-07-30

## 2019-07-31 ENCOUNTER — TELEPHONE (OUTPATIENT)
Dept: INTERNAL MEDICINE CLINIC | Facility: CLINIC | Age: 69
End: 2019-07-31

## 2019-07-31 NOTE — TELEPHONE ENCOUNTER
Changed appt type on 8/6 from medicare wellness to HFU per AS. Called and left message informing Sherren Offer that appt type was changed and to call back with any questions. Provided office #. Called and updated pt as well.   Left detailed message explainin

## 2019-07-31 NOTE — TELEPHONE ENCOUNTER
Sarina Truong called needing to make hosp fup for pt-he does have wellness visit sched w/AS on 8/6-ok to change to hosp fup?  If ok please call Sarina Truong back and change appt

## 2019-08-05 ENCOUNTER — TELEPHONE (OUTPATIENT)
Dept: NEUROLOGY | Facility: CLINIC | Age: 69
End: 2019-08-05

## 2019-08-06 ENCOUNTER — OFFICE VISIT (OUTPATIENT)
Dept: INTERNAL MEDICINE CLINIC | Facility: CLINIC | Age: 69
End: 2019-08-06
Payer: MEDICARE

## 2019-08-06 VITALS
TEMPERATURE: 98 F | WEIGHT: 202 LBS | HEIGHT: 66 IN | SYSTOLIC BLOOD PRESSURE: 130 MMHG | BODY MASS INDEX: 32.47 KG/M2 | DIASTOLIC BLOOD PRESSURE: 60 MMHG | HEART RATE: 64 BPM

## 2019-08-06 DIAGNOSIS — I63.81 LACUNAR INFARCTION (HCC): ICD-10-CM

## 2019-08-06 DIAGNOSIS — R41.0 CONFUSION: ICD-10-CM

## 2019-08-06 DIAGNOSIS — N39.0 URINARY TRACT INFECTION WITHOUT HEMATURIA, SITE UNSPECIFIED: ICD-10-CM

## 2019-08-06 DIAGNOSIS — Z09 HOSPITAL DISCHARGE FOLLOW-UP: Primary | ICD-10-CM

## 2019-08-06 DIAGNOSIS — I10 ESSENTIAL HYPERTENSION: ICD-10-CM

## 2019-08-06 DIAGNOSIS — R76.8 CYCLIC CITRULLINATED PEPTIDE (CCP) ANTIBODY POSITIVE: ICD-10-CM

## 2019-08-06 PROCEDURE — 99214 OFFICE O/P EST MOD 30 MIN: CPT | Performed by: INTERNAL MEDICINE

## 2019-08-06 PROCEDURE — 1111F DSCHRG MED/CURRENT MED MERGE: CPT | Performed by: INTERNAL MEDICINE

## 2019-08-06 RX ORDER — DOXAZOSIN 8 MG/1
4 TABLET ORAL EVERY MORNING
Refills: 2 | COMMUNITY
Start: 2019-08-06 | End: 2020-01-07

## 2019-08-06 RX ORDER — TAMSULOSIN HYDROCHLORIDE 0.4 MG/1
0.4 CAPSULE ORAL NIGHTLY
COMMUNITY
Start: 2019-08-02 | End: 2019-08-06

## 2019-08-06 NOTE — PROGRESS NOTES
Patient presents with:  Hospital F/U: LG. Room 8. UTI follow up       HPI:  Here for hospital f.u from severe uti and hyptension admmitted at [facility]. recevied iv abx, had ct brain shich showd an old infarct.  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 Disp:  Rfl:    traMADol HCl 50 MG Oral Tab Take 50 mg by mouth every 8 (eight) hours as needed for Pain. Disp:  Rfl:        Physical Exam  /60 (BP Location: Right arm, Patient Position: Sitting, Cuff Size: adult)   Pulse 64   Temp 97.9 °F (36. the plan.

## 2019-08-09 ENCOUNTER — TELEPHONE (OUTPATIENT)
Dept: NEUROLOGY | Facility: CLINIC | Age: 69
End: 2019-08-09

## 2019-08-13 ENCOUNTER — OFFICE VISIT (OUTPATIENT)
Dept: INTERNAL MEDICINE CLINIC | Facility: CLINIC | Age: 69
End: 2019-08-13
Payer: MEDICARE

## 2019-08-13 VITALS
HEART RATE: 76 BPM | WEIGHT: 203 LBS | HEIGHT: 66 IN | BODY MASS INDEX: 32.62 KG/M2 | DIASTOLIC BLOOD PRESSURE: 70 MMHG | SYSTOLIC BLOOD PRESSURE: 124 MMHG | RESPIRATION RATE: 16 BRPM

## 2019-08-13 DIAGNOSIS — I10 ESSENTIAL HYPERTENSION: Primary | ICD-10-CM

## 2019-08-13 PROCEDURE — 99213 OFFICE O/P EST LOW 20 MIN: CPT | Performed by: INTERNAL MEDICINE

## 2019-08-13 NOTE — PROGRESS NOTES
Patient presents with: Follow - Up: from resuming medications and for BP. LB-rm 9      HPI:  Here for f/u of bp, adjusted meds for urinary flow and bp, no low bp's and feels well. Review of Systems   No f/c/chest pain or sob. No cough.  No abd pain/n/v [address]     Encounter for long-term (current) use of medications     S/P PTCA (percutaneous transluminal coronary angioplasty)     Hx of CABG     Cyclic citrullinated peptide (CCP) antibody positive     Rheumatoid arthritis involving both hands with negative Cardiovascular: Normal rate, regular rhythm and intact distal pulses. No murmur, rubs or gallops. Pulmonary/Chest: Effort normal and breath sounds normal. No respiratory distress. Abdominal: Soft.  Bowel sounds are normal. Non tender, no masses, no or

## 2019-08-22 ENCOUNTER — OFFICE VISIT (OUTPATIENT)
Dept: SURGERY | Facility: CLINIC | Age: 69
End: 2019-08-22
Payer: MEDICARE

## 2019-08-22 VITALS
HEART RATE: 76 BPM | HEIGHT: 66 IN | WEIGHT: 200 LBS | SYSTOLIC BLOOD PRESSURE: 128 MMHG | BODY MASS INDEX: 32.14 KG/M2 | TEMPERATURE: 98 F | DIASTOLIC BLOOD PRESSURE: 70 MMHG

## 2019-08-22 DIAGNOSIS — R35.1 BPH ASSOCIATED WITH NOCTURIA: Primary | ICD-10-CM

## 2019-08-22 DIAGNOSIS — Z98.890 HISTORY OF NEEDLE BIOPSY OF PROSTATE WITH NEGATIVE RESULT: ICD-10-CM

## 2019-08-22 DIAGNOSIS — N40.1 BPH ASSOCIATED WITH NOCTURIA: Primary | ICD-10-CM

## 2019-08-22 DIAGNOSIS — R97.20 ELEVATED PSA: ICD-10-CM

## 2019-08-22 PROCEDURE — 99204 OFFICE O/P NEW MOD 45 MIN: CPT | Performed by: UROLOGY

## 2019-08-22 NOTE — PROGRESS NOTES
[facility] (formerly [facility]), [facility] Urology  Initial Office Consultation    HPI:   Roma Campos. is a 70year old male here today for consultation at the request of, and a copy of this note will be sent to, Jayden Brunner MD.    1. BPH with LUTS  Patient has a long-s Atherosclerosis of coronary artery    • BACK PAIN    • Blurred vision    • BPH (benign prostatic hyperplasia)    • Cataract    • Chest pain on exertion    • Coronary atherosclerosis    • Heart attack ([facility] [address]    • Heart palpitations    • High blood pressure any of the following events  4/14/2014    Procedure: Grisel Estrada;  Surgeon: Evgeny Trinh MD;  Location: [address] [place] [address]   • Patient documented not to have experienced any of the following events  5/13/2014    Procedure: ;  Surge Abdominal: Soft. He exhibits no distension and no mass. There is no tenderness. No hernia. Genitourinary: Rectum normal, testes normal and penis normal. Prostate is enlarged (2+ enlarged, ~50-60 grams, smooth, nontender, nonnodular. ).  Right testis show longer sexually active and not really interested in invasive therapy. Patient is interested in reestablishing care with Dr. Yanet Khan here in [place]. He was informed that he has recently joined us and does have office hours here.  He would like to cons

## 2019-08-29 NOTE — TELEPHONE ENCOUNTER
Error entry;   CT of head showed anterior limb remote stroke but the MRI in 2018 showed an asymmetric right Anterior limb Internal capsule old lacunes

## 2019-09-12 ENCOUNTER — OFFICE VISIT (OUTPATIENT)
Dept: NEUROLOGY | Facility: CLINIC | Age: 69
End: 2019-09-12
Payer: MEDICARE

## 2019-09-12 VITALS
RESPIRATION RATE: 16 BRPM | HEART RATE: 60 BPM | DIASTOLIC BLOOD PRESSURE: 80 MMHG | SYSTOLIC BLOOD PRESSURE: 124 MMHG | BODY MASS INDEX: 33 KG/M2 | WEIGHT: 207.5 LBS

## 2019-09-12 DIAGNOSIS — I63.81 LACUNAR INFARCTION (HCC): Primary | ICD-10-CM

## 2019-09-12 DIAGNOSIS — R41.3 MEMORY PROBLEM: ICD-10-CM

## 2019-09-12 PROCEDURE — 99215 OFFICE O/P EST HI 40 MIN: CPT | Performed by: OTHER

## 2019-09-12 RX ORDER — PRAVASTATIN SODIUM 80 MG/1
80 TABLET ORAL DAILY
COMMUNITY
Start: 2019-07-24 | End: 2020-08-04

## 2019-09-12 NOTE — PROGRESS NOTES
[facility] Progress Note    HPI  Patient presents with:  Neurologic Problem: ED follow up for abnormal CT/balance problems      Stephani Thurston is a 70year old man with PMHx significant for HTN, HL, CAD, RA, who was previously fo • Chest pain on exertion    • Coronary atherosclerosis    • Heart attack ([facility] [address]    • Heart palpitations    • High blood pressure    • High cholesterol    • HYPERLIPIDEMIA    • Hyperlipidemia    • Pneumonia, organism unspecified(486)    • Rheumatoid arthriti Alcohol use: Not Currently        Alcohol/week: 1.0 standard drinks        Types: 1 Standard drinks or equivalent per week        Frequency: Monthly or less      Drug use: No      Sexual activity: Not Currently    Other Topics      Concerns:        Caf Lungs: clear to auscultation bilaterally  Extremities: no edema, peripheral pulses intact    Neck: supple, full range of motion; no carotid bruits    Fundoscopic Exam: optic discs sharp bilaterally    Neuro:  Mental status:  Orientation: Alert and oriented Patient was advised we will monitor for changes over time; will monitor memory and consider more formal testing ~ 6 months or sooner as needed pending clinical changes.      Patient was advised that we would wait until further workup is obtained and was adv

## 2019-09-12 NOTE — PROGRESS NOTES
Pt here for abnormality/difference in [facility] recently done during hospitalization. See TE dated 8/5/19.

## 2019-09-20 RX ORDER — CLONAZEPAM 1 MG/1
1 TABLET ORAL AT BEDTIME
Qty: 30 TABLET | Refills: 5 | Status: CANCELLED | OUTPATIENT
Start: 2019-09-20 | End: 2019-10-20

## 2019-09-23 ENCOUNTER — TELEPHONE (OUTPATIENT)
Dept: CARDIOLOGY | Age: 69
End: 2019-09-23

## 2019-09-23 RX ORDER — CLONAZEPAM 1 MG/1
1 TABLET ORAL AT BEDTIME
Qty: 30 TABLET | Refills: 1 | Status: SHIPPED | OUTPATIENT
Start: 2019-09-23 | End: 2020-01-21 | Stop reason: SDUPTHER

## 2019-10-10 ENCOUNTER — PATIENT OUTREACH (OUTPATIENT)
Dept: CASE MANAGEMENT | Age: 69
End: 2019-10-10

## 2019-10-10 ENCOUNTER — HOSPITAL (OUTPATIENT)
Dept: OTHER | Age: 69
End: 2019-10-10
Attending: INTERNAL MEDICINE

## 2019-10-10 PROCEDURE — 99214 OFFICE O/P EST MOD 30 MIN: CPT | Performed by: INTERNAL MEDICINE

## 2019-10-10 NOTE — PROGRESS NOTES
S/w pt to notify him of CCM program pt stated he will consider it and asked for me to call him back in a few days.

## 2019-10-11 ENCOUNTER — TELEPHONE (OUTPATIENT)
Dept: CARDIOLOGY | Age: 69
End: 2019-10-11

## 2019-10-11 RX ORDER — LANSOPRAZOLE 30 MG/1
30 CAPSULE, DELAYED RELEASE ORAL DAILY
Qty: 90 CAPSULE | Refills: 0 | Status: SHIPPED | OUTPATIENT
Start: 2019-10-11 | End: 2020-01-28

## 2019-10-11 RX ORDER — DOXAZOSIN MESYLATE 4 MG/1
4 TABLET ORAL NIGHTLY
Qty: 90 TABLET | Refills: 0 | Status: SHIPPED
Start: 2019-10-11 | End: 2020-12-10 | Stop reason: SDUPTHER

## 2019-10-11 RX ORDER — METOPROLOL SUCCINATE 50 MG/1
50 TABLET, EXTENDED RELEASE ORAL 2 TIMES DAILY
Qty: 60 TABLET | Refills: 11 | Status: SHIPPED | OUTPATIENT
Start: 2019-10-11 | End: 2020-10-13

## 2019-10-11 RX ORDER — ISOSORBIDE MONONITRATE 60 MG/1
TABLET, EXTENDED RELEASE ORAL
Qty: 90 TABLET | Refills: 11 | Status: SHIPPED | OUTPATIENT
Start: 2019-10-11 | End: 2020-11-09

## 2019-10-11 NOTE — TELEPHONE ENCOUNTER
Last Office Visit: 8-13-19 with AS for follow up   Last Rx Filled: 3-11-19 90 caps with 1 refill   Last Labs: 7-23-19 c-reactive protein/sed rate/sandi/RA  Future Appointment: 10-15-19     Per protocol to provider

## 2019-10-12 RX ORDER — LANSOPRAZOLE 30 MG/1
30 CAPSULE, DELAYED RELEASE ORAL DAILY
Qty: 90 CAPSULE | Refills: 0 | OUTPATIENT
Start: 2019-10-12

## 2019-10-14 ENCOUNTER — PATIENT OUTREACH (OUTPATIENT)
Dept: CASE MANAGEMENT | Age: 69
End: 2019-10-14

## 2019-10-14 NOTE — PROGRESS NOTES
S/w pt he stated he will discuss CCM program w/ AS at [facility] tomorrow and will call back if interested.

## 2019-10-15 ENCOUNTER — OFFICE VISIT (OUTPATIENT)
Dept: INTERNAL MEDICINE CLINIC | Facility: CLINIC | Age: 69
End: 2019-10-15
Payer: MEDICARE

## 2019-10-15 VITALS
DIASTOLIC BLOOD PRESSURE: 60 MMHG | TEMPERATURE: 98 F | HEIGHT: 66 IN | WEIGHT: 211 LBS | HEART RATE: 68 BPM | BODY MASS INDEX: 33.91 KG/M2 | SYSTOLIC BLOOD PRESSURE: 118 MMHG

## 2019-10-15 DIAGNOSIS — Z87.19 HISTORY OF GASTRITIS: ICD-10-CM

## 2019-10-15 DIAGNOSIS — M75.01 ADHESIVE CAPSULITIS OF RIGHT SHOULDER: ICD-10-CM

## 2019-10-15 DIAGNOSIS — K76.0 STEATOSIS OF LIVER: ICD-10-CM

## 2019-10-15 DIAGNOSIS — M65.4 DE QUERVAIN'S DISEASE (TENOSYNOVITIS): ICD-10-CM

## 2019-10-15 DIAGNOSIS — M47.812 SPONDYLOSIS OF CERVICAL REGION WITHOUT MYELOPATHY OR RADICULOPATHY: ICD-10-CM

## 2019-10-15 DIAGNOSIS — R26.81 GAIT INSTABILITY: ICD-10-CM

## 2019-10-15 DIAGNOSIS — N40.1 BPH ASSOCIATED WITH NOCTURIA: ICD-10-CM

## 2019-10-15 DIAGNOSIS — Z00.00 ENCOUNTER FOR ANNUAL HEALTH EXAMINATION: Primary | ICD-10-CM

## 2019-10-15 DIAGNOSIS — Z98.61 S/P PTCA (PERCUTANEOUS TRANSLUMINAL CORONARY ANGIOPLASTY): ICD-10-CM

## 2019-10-15 DIAGNOSIS — R47.89 WORD FINDING DIFFICULTY: ICD-10-CM

## 2019-10-15 DIAGNOSIS — R97.20 ELEVATED PSA: ICD-10-CM

## 2019-10-15 DIAGNOSIS — Z95.1 HX OF CABG: ICD-10-CM

## 2019-10-15 DIAGNOSIS — Z98.890 S/P COLONOSCOPY: ICD-10-CM

## 2019-10-15 DIAGNOSIS — Z95.5 HISTORY OF CORONARY ARTERY STENT PLACEMENT: ICD-10-CM

## 2019-10-15 DIAGNOSIS — I10 ESSENTIAL HYPERTENSION: ICD-10-CM

## 2019-10-15 DIAGNOSIS — Z79.899 HIGH RISK MEDICATION USE: ICD-10-CM

## 2019-10-15 DIAGNOSIS — E78.00 ELEVATED CHOLESTEROL: ICD-10-CM

## 2019-10-15 DIAGNOSIS — M51.26 HERNIATED LUMBAR INTERVERTEBRAL DISC: ICD-10-CM

## 2019-10-15 DIAGNOSIS — I25.118 CORONARY ARTERY DISEASE OF NATIVE ARTERY OF NATIVE HEART WITH STABLE ANGINA PECTORIS (HCC): ICD-10-CM

## 2019-10-15 DIAGNOSIS — R35.1 BPH ASSOCIATED WITH NOCTURIA: ICD-10-CM

## 2019-10-15 DIAGNOSIS — M06.041 RHEUMATOID ARTHRITIS INVOLVING BOTH HANDS WITH NEGATIVE RHEUMATOID FACTOR (HCC): ICD-10-CM

## 2019-10-15 DIAGNOSIS — M06.042 RHEUMATOID ARTHRITIS INVOLVING BOTH HANDS WITH NEGATIVE RHEUMATOID FACTOR (HCC): ICD-10-CM

## 2019-10-15 DIAGNOSIS — I63.81 LACUNAR INFARCTION (HCC): ICD-10-CM

## 2019-10-15 DIAGNOSIS — G47.33 OBSTRUCTIVE SLEEP APNEA: ICD-10-CM

## 2019-10-15 DIAGNOSIS — M19.049 CMC ARTHRITIS: ICD-10-CM

## 2019-10-15 DIAGNOSIS — A04.72 C. DIFFICILE ENTERITIS: ICD-10-CM
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
## 2019-10-15 PROBLEM — J18.9 NOSOCOMIAL PNEUMONIA: Status: RESOLVED | Noted: 2019-06-19 | Resolved: 2019-10-15

## 2019-10-15 PROCEDURE — G0009 ADMIN PNEUMOCOCCAL VACCINE: HCPCS | Performed by: INTERNAL MEDICINE

## 2019-10-15 PROCEDURE — 90732 PPSV23 VACC 2 YRS+ SUBQ/IM: CPT | Performed by: INTERNAL MEDICINE

## 2019-10-15 PROCEDURE — G0439 PPPS, SUBSEQ VISIT: HCPCS | Performed by: INTERNAL MEDICINE

## 2019-10-15 NOTE — PROGRESS NOTES
HPI:   Lei Began. is a 70year old male who presents for a Medicare Subsequent Annual Wellness visit (Pt already had Initial Annual Wellness). Here for awv.  Pt is doing well, s/p recent brief hospital stay for uti at [facility], he is fully chester we do NOT have it on file in [address] [place] [address] Rd. The patient has this document but we do not have it in [facility], and patient is instructed to get our office a copy of it for scanning into Epic. He smoked tobacco in the past but quit greater than 12 months ago. injections [6/16] - HOLGER Monreal     Steatosis of liver / Dx by U/S [4/18]     Lacunar infarction, [ ~ 4/18 ] Rt internal capsule ([address]     S/P colonoscopy (4/10) recheck 10 years - VERNON Matson     S/P PTCA (percutaneous transluminal coronary angioplasty)     Hx o (five) minutes as needed for Chest pain. Call physician  Dicyclomine HCl 10 MG Oral Cap, Take 1 capsule (10 mg total) by mouth daily as needed. Clopidogrel Bisulfate (PLAVIX) 75 MG Oral Tab, Take 75 mg by mouth daily.        MEDICAL INFORMATION:   He  has his brother and mother. SOCIAL HISTORY:   He  reports that he quit smoking about 39 years ago. He has a 5.00 pack-year smoking history. He has never used smokeless tobacco. He reports previous alcohol use of about 1.0 standard drinks of alcohol per week. normal, no rashes or lesions   Lymph nodes: Cervical, supraclavicular, and axillary nodes normal   Neurologic: Normal            Vaccination History     Immunization History   Administered Date(s) Administered   • Depo-Medrol 40mg Inj 04/19/2012   • [facility]U [facility]  H/O [4/10] gastritis - H. Pylori positive & treated - VERNON Lundberg  resolved  S/P colonoscopy (4/10) recheck 10 years - VERNON Drake  resolved  S/P PTCA (percutaneous transluminal coronary angioplasty)  resolved  Hx of CABG  resolved  High risk medication u Health Maintenance if applicable    Flex Sigmoidoscopy Screen every 10 years No results found for this or any previous visit. No flowsheet data found. Fecal Occult Blood Annually No results found for: FOB No flowsheet data found.     Glaucoma Screening Creatinine (mg/dL)   Date Value   06/20/2019 1.10   09/13/2016 1.21    No flowsheet data found. Drug Serum Conc  Annually No results found for: DIGOXIN, DIG, VALP No flowsheet data found.

## 2019-10-15 NOTE — PATIENT INSTRUCTIONS
Jessica Mckeon Jr.'s SCREENING SCHEDULE   Tests on this list are recommended by your physician but may not be covered, or covered at this frequency, by your insurer. Please check with your insurance carrier before scheduling to verify coverage.     PRE No results found for this or any previous visit.  Limited to patients who meet one of the following criteria:   • Men who are 73-68 years old and have smoked more than 100 cigarettes in their lifetime   • Anyone with a family history    Colorectal Cancer Sc in the same house as a HepB virus carrier   Homosexual men   Illicit injectable drug abusers     Tetanus Toxoid- Only covered with a cut with metal- TD and TDaP Not covered by Medicare Part B) No orders found for this or any previous visit.  This may be cov

## 2019-10-22 ENCOUNTER — OFFICE VISIT (OUTPATIENT)
Dept: INTERNAL MEDICINE CLINIC | Facility: CLINIC | Age: 69
End: 2019-10-22
Payer: MEDICARE

## 2019-10-22 ENCOUNTER — OFFICE VISIT (OUTPATIENT)
Dept: RHEUMATOLOGY | Facility: CLINIC | Age: 69
End: 2019-10-22
Payer: MEDICARE

## 2019-10-22 VITALS
WEIGHT: 208 LBS | DIASTOLIC BLOOD PRESSURE: 64 MMHG | SYSTOLIC BLOOD PRESSURE: 118 MMHG | BODY MASS INDEX: 33.43 KG/M2 | HEART RATE: 60 BPM | RESPIRATION RATE: 16 BRPM | OXYGEN SATURATION: 97 % | HEIGHT: 66 IN | TEMPERATURE: 98 F

## 2019-10-22 VITALS
HEIGHT: 66 IN | SYSTOLIC BLOOD PRESSURE: 110 MMHG | BODY MASS INDEX: 33.91 KG/M2 | RESPIRATION RATE: 18 BRPM | WEIGHT: 211 LBS | HEART RATE: 80 BPM | DIASTOLIC BLOOD PRESSURE: 78 MMHG

## 2019-10-22 DIAGNOSIS — M15.9 PRIMARY OSTEOARTHRITIS INVOLVING MULTIPLE JOINTS: ICD-10-CM

## 2019-10-22 DIAGNOSIS — Z79.899 HIGH RISK MEDICATION USE: ICD-10-CM

## 2019-10-22 DIAGNOSIS — M79.641 BILATERAL HAND PAIN: ICD-10-CM

## 2019-10-22 DIAGNOSIS — B35.6 TINEA CRURIS: ICD-10-CM

## 2019-10-22 DIAGNOSIS — R76.8 CYCLIC CITRULLINATED PEPTIDE (CCP) ANTIBODY POSITIVE: ICD-10-CM

## 2019-10-22 DIAGNOSIS — M79.642 BILATERAL HAND PAIN: ICD-10-CM

## 2019-10-22 DIAGNOSIS — L73.9 FOLLICULITIS: Primary | ICD-10-CM

## 2019-10-22 DIAGNOSIS — M19.049 CMC ARTHRITIS: ICD-10-CM

## 2019-10-22 DIAGNOSIS — M19.90 INFLAMMATORY ARTHRITIS: Primary | ICD-10-CM

## 2019-10-22 PROCEDURE — 99214 OFFICE O/P EST MOD 30 MIN: CPT | Performed by: INTERNAL MEDICINE

## 2019-10-22 PROCEDURE — 99213 OFFICE O/P EST LOW 20 MIN: CPT | Performed by: INTERNAL MEDICINE

## 2019-10-22 RX ORDER — NYSTATIN 100000 [USP'U]/G
POWDER TOPICAL
Qty: 60 G | Refills: 0 | Status: SHIPPED | OUTPATIENT
Start: 2019-10-22 | End: 2020-08-04 | Stop reason: ALTCHOICE

## 2019-10-22 RX ORDER — CEPHALEXIN 500 MG/1
500 CAPSULE ORAL 2 TIMES DAILY
Qty: 20 CAPSULE | Refills: 0 | Status: SHIPPED | OUTPATIENT
Start: 2019-10-22 | End: 2020-01-07

## 2019-10-22 NOTE — PROGRESS NOTES
Patient presents with:  Derm Problem: RG rm 8 sores and black bumps lower belly and groin area x 2 weeks      HPI:  Here for painful bumps and rash in groins and lower abdomen for about 1.5 weeks.  No discharge, applying topical abx with some relief but not internal capsule ([address]     S/P colonoscopy (4/10) recheck 10 years - VERNON Santos     S/P PTCA (percutaneous transluminal coronary angioplasty)     Hx of CABG     Rheumatoid arthritis involving both hands with negative rheumatoid factor ([address]     High risk medi SpO2 97%   BMI 33.57 kg/m²   Constitutional: Oriented to person, place, and time. No distress. Cardiovascular: Normal rate, regular rhythm and intact distal pulses. No murmur, rubs or gallops.    Pulmonary/Chest: Effort normal and breath sounds normal.

## 2019-10-22 NOTE — PROGRESS NOTES
RHEUMATOLOGY Follow up   Date of visit: 10/22/19  ? Patient presents with:  Joint Pain: est pt, 3mo fu-Pt was hospitalized in June and September for UTI. Pt co bilateral hand pain.       ASSESSMENT, DISCUSSION & PLAN   Assessment:  Inflammatory arthritis be sure to see ophthalmologist whenever they recommend to monitor for any toxicities from the medication  -- get updated labs prior to next visit  -- follow up 2-3 months or sooner as needed  -- call with any questions/concerns    Will consider methotrexat restarted since that time. He was doing well with exception of recent development of painful bumps in groin. Was seen by PCP earlier today and given rx for Keflex. States he did try cymbalta for about 2 weeks and did not notice a difference.  States when anti-platelet tx for heart.      Has family history of rheumatoid arthritis in his father. States he had it in his neck. But not sure if he was on any medication for it.    Both siblings (one brother, one sister) with early heart disease and multiple stents arthritis ([facility] [address]    • Shortness of breath    • Stented coronary artery      Past Surgical History:  Past Surgical History:   Procedure Laterality Date   • ANGIOGRAM     • ANGIOPLASTY (CORONARY)      2014   • ANGIOPLASTY (CORONARY)      11/2015   • Fernando Durham Sodium 80 MG Oral Tab, Take 80 mg by mouth daily. , Disp: , Rfl:   aspirin 81 MG Oral Tab, Take 81 mg by mouth daily. , Disp: , Rfl:   Doxazosin Mesylate 8 MG Oral Tab, Take 4 mg by mouth every morning. ~ 1/2 hour after dinner , Disp: , Rfl: 2  Metoprolol Figueroa BP: 110/78 Pulse: 80 Resp: 18     ? Current Weight Height BMI BSA Pain   Wt Readings from Last 1 Encounters:  10/22/19 : 211 lb (95.7 kg)   Height: 66\" Body mass index is 34.06 kg/m². Body surface area is 2.05 meters squared.          Physical Exam (MIN 3 VIEWS), RIGHT (CPT=73130)     TECHNIQUE:  Three views were obtained. COMPARISON:  None.      INDICATIONS:  M79.642 Pain in left hand M79.641 Pain in right hand     PATIENT STATED HISTORY: (As transcribed by Technologist)  Patient has been experie 06/20/2019    LYPERCENT 14.2 06/20/2019    MOPERCENT 22.5 06/20/2019    EOPERCENT 1.8 06/20/2019    BAPERCENT 0.6 06/20/2019    NE 3.94 06/20/2019    LYMABS 0.93 (L) 06/20/2019    MOABSO 1.48 (H) 06/20/2019    EOABSO 0.12 06/20/2019    BAABSO 0.04 06/20/20

## 2019-11-06 RX ORDER — PRAVASTATIN SODIUM 80 MG/1
TABLET ORAL
Qty: 90 TABLET | Refills: 0 | OUTPATIENT
Start: 2019-11-06

## 2019-11-06 RX ORDER — PRAVASTATIN SODIUM 80 MG/1
TABLET ORAL
Qty: 90 TABLET | Refills: 0 | Status: SHIPPED | OUTPATIENT
Start: 2019-11-06 | End: 2020-02-08

## 2019-11-26 ENCOUNTER — TELEPHONE (OUTPATIENT)
Dept: CARDIOLOGY | Age: 69
End: 2019-11-26

## 2019-11-26 RX ORDER — CLOPIDOGREL BISULFATE 75 MG/1
75 TABLET ORAL DAILY
Qty: 90 TABLET | Refills: 1 | Status: SHIPPED | OUTPATIENT
Start: 2019-11-26 | End: 2020-06-02

## 2019-12-03 ENCOUNTER — TELEPHONE (OUTPATIENT)
Dept: RHEUMATOLOGY | Facility: CLINIC | Age: 69
End: 2019-12-03

## 2019-12-03 NOTE — TELEPHONE ENCOUNTER
Patient called to report that he was hospitalized with a UTI and was off Hydroxychloroquine. He is off all antibiotics and states he no longer has any signs and symptoms of an infection. Calling for approval to start Hydroxychloroquine.   Your appointments

## 2020-01-22 RX ORDER — CLONAZEPAM 1 MG/1
1 TABLET ORAL AT BEDTIME
Qty: 30 TABLET | Refills: 1 | Status: SHIPPED | OUTPATIENT
Start: 2020-01-22 | End: 2020-04-09 | Stop reason: SDUPTHER

## 2020-01-24 ENCOUNTER — TELEPHONE (OUTPATIENT)
Dept: CARDIOLOGY | Age: 70
End: 2020-01-24

## 2020-01-24 NOTE — TELEPHONE ENCOUNTER
Pt called and stated that his cardiologist was filling his ClonazePAM 1 MG Oral Tab but stated that he needed to have his PCP start filling it.      Pt is asking for a refill ASAP so he will not run out over the weekend

## 2020-01-24 NOTE — TELEPHONE ENCOUNTER
Get info from cardiologist for review and inform pt AS will review Monday, We have not written it before and need records

## 2020-01-24 NOTE — TELEPHONE ENCOUNTER
Please see previous TE        LOV:10/22/19 AS  FOV:3/10/20  LAST RX:not filled in office   LAST LABS:1/7/2020 urinalysis  PER PROTOCOL: to provider

## 2020-01-27 RX ORDER — CLONAZEPAM 1 MG/1
1 TABLET ORAL NIGHTLY PRN
Qty: 30 TABLET | Refills: 0 | Status: SHIPPED | OUTPATIENT
Start: 2020-01-27 | End: 2021-09-07

## 2020-01-27 NOTE — TELEPHONE ENCOUNTER
Per TE in [address], pt follows with Dr. Shruthi Schmidt, [facility]. .    Telephone Encounter - Crissy Alcaraz RN - 09/23/2019 10:31 AM CDT  Received call from pharmacist Emily who states they have made multiple attempts to reach the office

## 2020-01-28 RX ORDER — LANSOPRAZOLE 30 MG/1
30 CAPSULE, DELAYED RELEASE ORAL DAILY
Qty: 90 CAPSULE | Refills: 0 | Status: SHIPPED | OUTPATIENT
Start: 2020-01-28 | End: 2020-05-04

## 2020-01-28 NOTE — TELEPHONE ENCOUNTER
Last Ov: 10/22/19, AS, acute  Upcoming appt: 3/10/20, AS  Last labs: CCP, RH factor, EMMETT w Ref, Sed rate, CRP 7/23/19  Last Rx: lansoprazole 30mg, #90, 0R 10/11/19    Per Protocol, not on protocol. Rx pending.

## 2020-01-29 RX ORDER — CLONAZEPAM 1 MG/1
1 TABLET ORAL AT BEDTIME
Qty: 30 TABLET | Refills: 1 | OUTPATIENT
Start: 2020-01-29

## 2020-02-08 RX ORDER — PRAVASTATIN SODIUM 80 MG/1
TABLET ORAL
Qty: 90 TABLET | Refills: 1 | Status: SHIPPED | OUTPATIENT
Start: 2020-02-08 | End: 2020-08-06

## 2020-02-18 ENCOUNTER — OFFICE VISIT (OUTPATIENT)
Dept: INTERNAL MEDICINE CLINIC | Facility: CLINIC | Age: 70
End: 2020-02-18
Payer: MEDICARE

## 2020-02-18 VITALS
BODY MASS INDEX: 34 KG/M2 | TEMPERATURE: 98 F | SYSTOLIC BLOOD PRESSURE: 134 MMHG | HEART RATE: 65 BPM | DIASTOLIC BLOOD PRESSURE: 62 MMHG | OXYGEN SATURATION: 97 % | WEIGHT: 212.19 LBS

## 2020-02-18 DIAGNOSIS — M06.042 RHEUMATOID ARTHRITIS INVOLVING BOTH HANDS WITH NEGATIVE RHEUMATOID FACTOR (HCC): ICD-10-CM

## 2020-02-18 DIAGNOSIS — I25.118 CORONARY ARTERY DISEASE OF NATIVE ARTERY OF NATIVE HEART WITH STABLE ANGINA PECTORIS (HCC): ICD-10-CM

## 2020-02-18 DIAGNOSIS — M06.041 RHEUMATOID ARTHRITIS INVOLVING BOTH HANDS WITH NEGATIVE RHEUMATOID FACTOR (HCC): ICD-10-CM

## 2020-02-18 DIAGNOSIS — L73.9 FOLLICULITIS: Primary | ICD-10-CM

## 2020-02-18 PROCEDURE — 99214 OFFICE O/P EST MOD 30 MIN: CPT | Performed by: NURSE PRACTITIONER

## 2020-02-18 RX ORDER — SULFAMETHOXAZOLE AND TRIMETHOPRIM 800; 160 MG/1; MG/1
1 TABLET ORAL 2 TIMES DAILY
Qty: 14 TABLET | Refills: 0 | Status: SHIPPED | OUTPATIENT
Start: 2020-02-18 | End: 2020-08-04 | Stop reason: ALTCHOICE

## 2020-02-18 RX ORDER — CHLORHEXIDINE GLUCONATE 4 G/100ML
30 SOLUTION TOPICAL
Qty: 946 ML | Refills: 0 | Status: SHIPPED | OUTPATIENT
Start: 2020-02-18 | End: 2020-08-04 | Stop reason: ALTCHOICE

## 2020-02-18 RX ORDER — CLINDAMYCIN PHOSPHATE 10 MG/ML
1 LOTION TOPICAL 2 TIMES DAILY
Qty: 60 ML | Refills: 0 | Status: SHIPPED | OUTPATIENT
Start: 2020-02-18 | End: 2020-08-04 | Stop reason: ALTCHOICE

## 2020-02-18 NOTE — PROGRESS NOTES
Kamar Sethi. is a 78year old male. Patient presents with:  Rash: on and off rash since UTI in june 2019      HPI:     Here for recurrent painful bumps and rash in groins and lower abdomen for about 1.5 weeks.  Has been more persistent since his U 800-160 MG Oral Tab per tablet Take 1 tablet by mouth 2 (two) times daily. 14 tablet 0   • Chlorhexidine Gluconate (HIBICLENS) 4 % External Liquid Apply 30 mL topically daily as needed.  946 mL 0   • Clindamycin Phosphate 1 % External Lotion Apply 1 Applica (healthcare-associated pneumonia)    • Heart attack ([facility] [address]    • Heart palpitations    • High blood pressure    • High cholesterol    • HYPERLIPIDEMIA    • Hyperlipidemia    • Nosocomial pneumonia 6/19/2019   • Pneumonia, organism unspecified(486)    • Rheum use with patient. clinda lotion nightly  Has f/u with AS in March.    Rheumatoid arthritis involving both hands with negative rheumatoid factor (hcc)  Coronary artery disease of native artery of native heart with stable angina pectoris (hcc)    No orders o

## 2020-02-25 ENCOUNTER — TELEPHONE (OUTPATIENT)
Dept: INTERNAL MEDICINE CLINIC | Facility: CLINIC | Age: 70
End: 2020-02-25

## 2020-02-25 RX ORDER — OSELTAMIVIR PHOSPHATE 75 MG/1
75 CAPSULE ORAL DAILY
Qty: 10 CAPSULE | Refills: 0 | Status: SHIPPED | OUTPATIENT
Start: 2020-02-25 | End: 2020-03-10

## 2020-02-25 NOTE — TELEPHONE ENCOUNTER
Patient notified AS would like him to start Tamiflu 75mg daily x 10 days sent to [facility]. Pt verbalizes understanding.

## 2020-02-25 NOTE — TELEPHONE ENCOUNTER
Wife diage w/flu and pneumonia and in hospital-pt now has scratchy throat/no cough or fever or body aches-can he get tamiflu?call to advise

## 2020-03-10 ENCOUNTER — OFFICE VISIT (OUTPATIENT)
Dept: INTERNAL MEDICINE CLINIC | Facility: CLINIC | Age: 70
End: 2020-03-10
Payer: MEDICARE

## 2020-03-10 VITALS
BODY MASS INDEX: 33.81 KG/M2 | DIASTOLIC BLOOD PRESSURE: 80 MMHG | HEIGHT: 66 IN | SYSTOLIC BLOOD PRESSURE: 126 MMHG | TEMPERATURE: 98 F | HEART RATE: 60 BPM | WEIGHT: 210.38 LBS | RESPIRATION RATE: 16 BRPM

## 2020-03-10 DIAGNOSIS — I25.118 CORONARY ARTERY DISEASE OF NATIVE ARTERY OF NATIVE HEART WITH STABLE ANGINA PECTORIS (HCC): ICD-10-CM

## 2020-03-10 DIAGNOSIS — M06.041 RHEUMATOID ARTHRITIS INVOLVING BOTH HANDS WITH NEGATIVE RHEUMATOID FACTOR (HCC): ICD-10-CM

## 2020-03-10 DIAGNOSIS — H91.8X3 OTHER SPECIFIED HEARING LOSS OF BOTH EARS: ICD-10-CM

## 2020-03-10 DIAGNOSIS — I10 ESSENTIAL HYPERTENSION: Primary | ICD-10-CM

## 2020-03-10 DIAGNOSIS — H61.23 BILATERAL IMPACTED CERUMEN: ICD-10-CM

## 2020-03-10 DIAGNOSIS — Z12.5 SCREENING PSA (PROSTATE SPECIFIC ANTIGEN): ICD-10-CM

## 2020-03-10 DIAGNOSIS — M06.042 RHEUMATOID ARTHRITIS INVOLVING BOTH HANDS WITH NEGATIVE RHEUMATOID FACTOR (HCC): ICD-10-CM

## 2020-03-10 PROCEDURE — 69209 REMOVE IMPACTED EAR WAX UNI: CPT | Performed by: INTERNAL MEDICINE

## 2020-03-10 PROCEDURE — 99214 OFFICE O/P EST MOD 30 MIN: CPT | Performed by: INTERNAL MEDICINE

## 2020-03-11 ENCOUNTER — TELEPHONE (OUTPATIENT)
Dept: INTERNAL MEDICINE CLINIC | Facility: CLINIC | Age: 70
End: 2020-03-11

## 2020-03-11 NOTE — PROGRESS NOTES
Patient presents with:  Medication Follow-Up: MR rm 8   Ear Problem: feeling clogged with some pain and discomfort       HPI:  Here for f/u of htn, cad, ra, stable taking meds no se's> otes bilateral hearing loss and fullness in ears, on exam has cerumen o colonoscopy (4/10) recheck 10 years - VERNON Reyes     S/P PTCA (percutaneous transluminal coronary angioplasty)     Hx of CABG     Rheumatoid arthritis involving both hands with negative rheumatoid factor ([facility] [address]     High risk medication use     [employer] 3/10/2020 ) 60 mL 0   • Dicyclomine HCl 10 MG Oral Cap Take 10 mg by mouth 4 (four) times daily before meals and nightly.      • Nystatin 594503 UNIT/GM External Powder Apply to the groin twice daily for 10 days (Patient not taking: Reported on 3/10/2020 ) anand  See me in 3 mos for f/u  Rpt labs prior to visit  Orders Placed This Encounter      CBC With Diff      CMP      Lipid      Hemoglobin A1C      TSH W Reflex To Free T4      PSA (Screening) [E]      Meds & Refills for this Visit:  Requested Prescrip

## 2020-03-11 NOTE — TELEPHONE ENCOUNTER
Ear's still clogged,    Pt was in to see AS and had ear wax cleaned 3-. Pt states they still very clogged. Wants to know if that if that is normal.    Pls call to discuss.

## 2020-03-12 NOTE — TELEPHONE ENCOUNTER
He can try otc debrox or let warm water run in the shower, if not improving pt should return here for re-evaluation.

## 2020-03-13 NOTE — TELEPHONE ENCOUNTER
Patient notified to use Debrox otc, or let warm water run in the shower. Please call if any questions. Pt verbalizes understanding.

## 2020-03-17 NOTE — TELEPHONE ENCOUNTER
Pt called back stating still having issues with ear that we were looking into what to do for him-please call him back-call on home number

## 2020-03-18 NOTE — TELEPHONE ENCOUNTER
Spoke to patient and advised him of recommendations. Pt didn't remember the instructions given by previous RN Elena Francisco on 3/13. Pt states he will try the recommendations.  Pt instructed to call with an update on his s/s in a few days or if there are any change

## 2020-03-18 NOTE — TELEPHONE ENCOUNTER
Returning your call Call before 12 pm his wife is in the hospital so he will not be available after 12 pm. Please call [phone].

## 2020-03-18 NOTE — TELEPHONE ENCOUNTER
Siobhan Nunez if ear is still bothering him, we may be able to see him to look at it one day this week

## 2020-03-20 NOTE — TELEPHONE ENCOUNTER
Patient states his left ear is still plugged, no pain but discomfort and irritating that he cannot hear out of that ear. OV with AS on 3/10/20. Pt states he used Debrox for one week and didn't help. Please advise.

## 2020-03-20 NOTE — TELEPHONE ENCOUNTER
Try using bulb syringe and irrigate that ear over the weekend with warm warter and quarter cup of peroxide, if not better over weekend, have him see me Monday assuming I am back in office.

## 2020-03-23 ENCOUNTER — PATIENT OUTREACH (OUTPATIENT)
Dept: CASE MANAGEMENT | Age: 70
End: 2020-03-23

## 2020-03-24 ENCOUNTER — PATIENT OUTREACH (OUTPATIENT)
Dept: CASE MANAGEMENT | Age: 70
End: 2020-03-24

## 2020-03-24 NOTE — PROGRESS NOTES
Spoke with wife states patient is unable to talk at this time and asked to call back another day.     Time Spent This Encounter Total: 4 minutes

## 2020-03-24 NOTE — TELEPHONE ENCOUNTER
Patient notified per AS try using bulb syringe and irrigate that ear for a couple of days with warm warter and quarter cup of peroxide, if not better then call our office. Pt verbalizes understanding.

## 2020-03-25 ENCOUNTER — PATIENT OUTREACH (OUTPATIENT)
Dept: CASE MANAGEMENT | Age: 70
End: 2020-03-25

## 2020-03-25 NOTE — PROGRESS NOTES
Spoke with wife she asked I give patient a call to home number. Patient relates this is a hard week and a lot has been going on. Patient asked I give him a call back another day for CCM enrollment.     Time Spent This Encounter Total: 9 minutes medical chester

## 2020-04-09 ENCOUNTER — APPOINTMENT (OUTPATIENT)
Dept: CARDIOLOGY | Age: 70
End: 2020-04-09

## 2020-04-09 ENCOUNTER — OFFICE VISIT (OUTPATIENT)
Dept: CARDIOLOGY | Age: 70
End: 2020-04-09
Attending: FAMILY MEDICINE

## 2020-04-09 VITALS
BODY MASS INDEX: 32.95 KG/M2 | WEIGHT: 205 LBS | SYSTOLIC BLOOD PRESSURE: 139 MMHG | OXYGEN SATURATION: 98 % | HEART RATE: 70 BPM | DIASTOLIC BLOOD PRESSURE: 84 MMHG | HEIGHT: 66 IN | TEMPERATURE: 97.6 F

## 2020-04-09 DIAGNOSIS — Z95.1 HX OF CABG: ICD-10-CM

## 2020-04-09 DIAGNOSIS — E78.2 MIXED HYPERLIPIDEMIA: ICD-10-CM

## 2020-04-09 DIAGNOSIS — I10 BENIGN ESSENTIAL HYPERTENSION: ICD-10-CM

## 2020-04-09 DIAGNOSIS — I20.89 CHRONIC STABLE ANGINA: Primary | ICD-10-CM

## 2020-04-09 DIAGNOSIS — I25.118 ATHEROSCLEROSIS OF NATIVE CORONARY ARTERY OF NATIVE HEART WITH STABLE ANGINA PECTORIS (CMD): ICD-10-CM

## 2020-04-09 DIAGNOSIS — Z95.5 S/P PRIMARY ANGIOPLASTY WITH CORONARY STENT: ICD-10-CM

## 2020-04-09 PROBLEM — I25.10 CORONARY ATHEROSCLEROSIS OF NATIVE CORONARY ARTERY: Status: ACTIVE | Noted: 2020-04-09

## 2020-04-09 PROCEDURE — 99443 TELEPHONE E&M BY PHYSICIAN EST PT NOT ORIG PREV 7 DAYS 21-30 MIN: CPT | Performed by: INTERNAL MEDICINE

## 2020-04-09 RX ORDER — TRAMADOL HYDROCHLORIDE 50 MG/1
50 TABLET ORAL PRN
COMMUNITY
Start: 2018-07-12 | End: 2023-04-25

## 2020-04-09 RX ORDER — CETIRIZINE HYDROCHLORIDE 10 MG/1
10 TABLET ORAL DAILY
COMMUNITY

## 2020-04-09 RX ORDER — CLONAZEPAM 1 MG/1
1 TABLET ORAL AT BEDTIME
Qty: 90 TABLET | Refills: 3 | Status: SHIPPED | OUTPATIENT
Start: 2020-04-09 | End: 2020-10-26 | Stop reason: SDUPTHER

## 2020-04-09 RX ORDER — LANSOPRAZOLE 30 MG/1
CAPSULE, DELAYED RELEASE ORAL
COMMUNITY
Start: 2020-01-28 | End: 2020-12-10 | Stop reason: SDUPTHER

## 2020-04-09 RX ORDER — PRAVASTATIN SODIUM 80 MG/1
TABLET ORAL
COMMUNITY
Start: 2019-07-24 | End: 2022-02-25

## 2020-04-09 RX ORDER — DICYCLOMINE HYDROCHLORIDE 10 MG/1
10 CAPSULE ORAL PRN
COMMUNITY
Start: 2010-11-17 | End: 2020-12-10 | Stop reason: ALTCHOICE

## 2020-04-09 RX ORDER — ALPRAZOLAM 0.25 MG/1
0.25 TABLET ORAL PRN
COMMUNITY
Start: 2010-11-17

## 2020-04-09 SDOH — HEALTH STABILITY: MENTAL HEALTH
STRESS IS WHEN SOMEONE FEELS TENSE, NERVOUS, ANXIOUS, OR CAN'T SLEEP AT NIGHT BECAUSE THEIR MIND IS TROUBLED. HOW STRESSED ARE YOU?: TO SOME EXTENT

## 2020-04-09 SDOH — HEALTH STABILITY: MENTAL HEALTH: HOW OFTEN DO YOU HAVE A DRINK CONTAINING ALCOHOL?: NEVER

## 2020-04-09 SDOH — HEALTH STABILITY: PHYSICAL HEALTH: ON AVERAGE, HOW MANY DAYS PER WEEK DO YOU ENGAGE IN MODERATE TO STRENUOUS EXERCISE (LIKE A BRISK WALK)?: 0 DAYS

## 2020-04-09 SDOH — HEALTH STABILITY: PHYSICAL HEALTH: ON AVERAGE, HOW MANY MINUTES DO YOU ENGAGE IN EXERCISE AT THIS LEVEL?: 0 MIN

## 2020-04-09 ASSESSMENT — ENCOUNTER SYMPTOMS
PSYCHIATRIC NEGATIVE: 1
CONSTITUTIONAL NEGATIVE: 1
RESPIRATORY NEGATIVE: 1
EYES NEGATIVE: 1
NEUROLOGICAL NEGATIVE: 1
GASTROINTESTINAL NEGATIVE: 1
ALLERGIC/IMMUNOLOGIC NEGATIVE: 1
ENDOCRINE NEGATIVE: 1

## 2020-05-04 RX ORDER — LANSOPRAZOLE 30 MG/1
CAPSULE, DELAYED RELEASE ORAL
Qty: 90 CAPSULE | Refills: 0 | Status: SHIPPED | OUTPATIENT
Start: 2020-05-04 | End: 2020-08-06

## 2020-05-04 NOTE — TELEPHONE ENCOUNTER
Last VISIT 3/10/20 AS HTN    Last REFILL 1/28/20 Lansoprazole 90C 0R    Last LABS 7/23/19 CRP, Sed rate, EMMETT, RA, Cyclic Citrullinate    No future appointments. Per PROTOCOL? Lansoprazole not on protocol, Route to provider    Please Approve or Deny.

## 2020-05-15 ENCOUNTER — TELEPHONE (OUTPATIENT)
Dept: RHEUMATOLOGY | Facility: CLINIC | Age: 70
End: 2020-05-15

## 2020-06-02 ENCOUNTER — TELEPHONE (OUTPATIENT)
Dept: CARDIOLOGY | Age: 70
End: 2020-06-02

## 2020-06-02 RX ORDER — CLOPIDOGREL BISULFATE 75 MG/1
75 TABLET ORAL DAILY
Qty: 90 TABLET | Refills: 0 | Status: SHIPPED | OUTPATIENT
Start: 2020-06-02 | End: 2020-06-02 | Stop reason: SDUPTHER

## 2020-06-02 RX ORDER — CLOPIDOGREL BISULFATE 75 MG/1
75 TABLET ORAL DAILY
Qty: 90 TABLET | Refills: 2 | Status: SHIPPED | OUTPATIENT
Start: 2020-06-02 | End: 2021-03-18 | Stop reason: SDUPTHER

## 2020-06-02 NOTE — TELEPHONE ENCOUNTER
Phoned pt, patient states that his past co of hand pain is pretty much non existent. Pt has been stay at home with the pandemic. Declines to make follow up appointment at this time, will talk to his spouse.  Explained to patient to call our office with wors

## 2020-07-07 ENCOUNTER — PATIENT OUTREACH (OUTPATIENT)
Dept: CASE MANAGEMENT | Age: 70
End: 2020-07-07

## 2020-07-07 NOTE — PROGRESS NOTES
Called patient for CCM enrollment. No answer left voicemail to call back. Time Spent This Encounter Total: 3 minutes medical record review, telephone communication, care plan updates where needed, education, goals and action plan recreation/update.  Prov

## 2020-07-08 ENCOUNTER — PATIENT OUTREACH (OUTPATIENT)
Dept: CASE MANAGEMENT | Age: 70
End: 2020-07-08

## 2020-07-08 NOTE — PROGRESS NOTES
Called patient for CCM outreach. Patient states he is getting ready to leave the house and asked to call back tomorrow.     Time Spent This Encounter Total: 8 minutes medical record review, telephone communication, care plan updates where needed, education,

## 2020-07-09 ENCOUNTER — PATIENT OUTREACH (OUTPATIENT)
Dept: CASE MANAGEMENT | Age: 70
End: 2020-07-09

## 2020-07-09 NOTE — PROGRESS NOTES
Called patient for CCM outreach. Patient states he has a busy day today and he will call me back.     Time Spent This Encounter Total: 3 minutes medical record review, telephone communication, care plan updates where needed, education, goals and action plan

## 2020-08-04 RX ORDER — NITROGLYCERIN 0.4 MG/1
TABLET SUBLINGUAL
Qty: 25 TABLET | Refills: 4 | Status: SHIPPED | OUTPATIENT
Start: 2020-08-04 | End: 2021-11-03

## 2020-08-04 NOTE — PROGRESS NOTES
[facility] Progress Note    HPI  Patient presents with:  Stroke:  Follow up   Memory Loss: C/O of short term memory loss      Christopher Lowery. is a 78year old man with PMHx significant for HTN, HL, CAD, RA, who was previously follo Overall, since last visit, patient has noted he continues to have difficulty with word finding when he is speaking at times. He is noted to have \"good days\" and \"bad days. \"  Per wife, she gives example of patient sitting in the back seat when he was Performed by Lakhwinder Love MD at [facility]   • TRANSFORAMINAL EPIDURAL - LUMBAR N/A 7/23/2012    Performed by Lakhwinder Love MD at [facility]   • TRANSFORAMINAL EPIDURAL - LUMBAR N/A 6/29/2012    Performed by Thai Morales •  Metoprolol Succinate ER 50 MG Oral Tablet 24 Hr, Take 1 tablet (50 mg total) by mouth 2x Daily(Beta Blocker). , Disp: 60 tablet, Rfl: 3  •  traMADol HCl 50 MG Oral Tab, Take 50 mg by mouth every 8 (eight) hours as needed for Pain., Disp: , Rfl:   •  nitr Attention: 2/6 (could not give 5 numbers in forward direction or 3 in reverse; able to do letter test and only able to do 1 number on serial 7s - said 93, 83, 64)  Language: 2 / 3  Abstraction: 0 / 2  Delayed recall: 0/5 (0/5 even with hints but 4/5 with m However in order to evaluate for potential reversible causes, will plan for vitamin B12, as well as TSH  Should these be unremarkable, however, I discussed extensively with the family, that I suspect that he has underlying vascular dementia and would benef

## 2020-08-04 NOTE — PATIENT INSTRUCTIONS
Have labs done at earliest convenience  After labs done, will follow up on results and likely recommend starting donepezil as follows: start at 5 mg nightly x 4 weeks, then increase to to 10 mg nightly  Follow up in ~ 4 months   Refill policies:    • Allow 3:00PM MONDAY-FRIDAY    Scheduling Tests: If your physician has ordered radiology tests such as MRI or CT scans, do not schedule the test until this office has notified you that the test has been approved by your insurer.   Depending on your insurance ca ask the  for this paper. • Failure to follow above steps may result in the delay of form completion.   •

## 2020-08-06 ENCOUNTER — TELEPHONE (OUTPATIENT)
Dept: NEUROLOGY | Facility: CLINIC | Age: 70
End: 2020-08-06

## 2020-08-06 RX ORDER — LANSOPRAZOLE 30 MG/1
CAPSULE, DELAYED RELEASE ORAL
Qty: 90 CAPSULE | Refills: 0 | Status: SHIPPED | OUTPATIENT
Start: 2020-08-06 | End: 2020-11-09

## 2020-08-06 RX ORDER — PRAVASTATIN SODIUM 80 MG/1
TABLET ORAL
Qty: 90 TABLET | Refills: 0 | Status: SHIPPED | OUTPATIENT
Start: 2020-08-06 | End: 2020-11-09

## 2020-08-06 NOTE — TELEPHONE ENCOUNTER
Last OV 3.10.20 w/ AS (acute)   Last PE 10.15.19   Last REFILL 5.4.20 Lansoprazole 30mg #90 0R    2.8.20 Pravastatin 80mg #90 1R  Last LABS No recent labs within last 12 months     Future Appointments   Date Time Provider Edison Dey   12/3/2020  2:0

## 2020-08-06 NOTE — TELEPHONE ENCOUNTER
RN received a call from Poth at [employer] and was informed the insurance does not cover the Donepezil ODT, they will cover the tablet. RN will confirm with provider.

## 2020-08-07 ENCOUNTER — TELEPHONE (OUTPATIENT)
Dept: NEUROLOGY | Facility: CLINIC | Age: 70
End: 2020-08-07

## 2020-08-07 NOTE — TELEPHONE ENCOUNTER
Pt has completed labs at [facility]. They will fax to our office on Monday, 8/10/20. Spouse will call Monday afternoon to confirm receipt and f/up with [facility] if necessary.

## 2020-08-13 ENCOUNTER — PATIENT OUTREACH (OUTPATIENT)
Dept: CASE MANAGEMENT | Age: 70
End: 2020-08-13

## 2020-08-13 NOTE — PROGRESS NOTES
Called patient for monthly CCM outreach. Spoke with wife on hipaa states patient is not available.     Time Spent This Encounter Total: 3 minutes medical record review, telephone communication, care plan updates where needed, education, goals and action unique

## 2020-08-14 NOTE — TELEPHONE ENCOUNTER
Received B12 and TSH results from [facility]. Notified pt. Copy sent to be scanned. Originals placed on Dr. Asa gerardo for his review. Please call pt to discuss results, [phone].

## 2020-08-14 NOTE — TELEPHONE ENCOUNTER
Spoke to Sean and labs were faxed to a totally different fax number. Gave her Laura Blair # [phone]. She will have staff fax to correct number. Contacted wife and explained [facility] error.

## 2020-08-14 NOTE — TELEPHONE ENCOUNTER
[facility] informed pt's spouse labs have been faxed to our office, but we have not received them. Called [facility] at [phone] but had to [place].

## 2020-08-18 NOTE — TELEPHONE ENCOUNTER
Spouse apologized for missing last night's call. Suggested we try her cell phone today and she will keep it with her. Please call [phone].

## 2020-08-27 ENCOUNTER — TELEPHONE (OUTPATIENT)
Dept: INTERNAL MEDICINE CLINIC | Facility: CLINIC | Age: 70
End: 2020-08-27

## 2020-08-27 NOTE — TELEPHONE ENCOUNTER
Wife called stated they are about an hr and a half away and its too hot to drive here for the appt.  She will call back to robbin

## 2020-09-10 ENCOUNTER — OFFICE VISIT (OUTPATIENT)
Dept: INTERNAL MEDICINE CLINIC | Facility: CLINIC | Age: 70
End: 2020-09-10
Payer: MEDICARE

## 2020-09-10 VITALS
HEART RATE: 76 BPM | SYSTOLIC BLOOD PRESSURE: 132 MMHG | HEIGHT: 66 IN | BODY MASS INDEX: 33.11 KG/M2 | TEMPERATURE: 97 F | DIASTOLIC BLOOD PRESSURE: 66 MMHG | WEIGHT: 206 LBS

## 2020-09-10 DIAGNOSIS — H91.93 BILATERAL HEARING LOSS, UNSPECIFIED HEARING LOSS TYPE: ICD-10-CM

## 2020-09-10 DIAGNOSIS — F01.50 VASCULAR DEMENTIA WITHOUT BEHAVIORAL DISTURBANCE (HCC): ICD-10-CM

## 2020-09-10 DIAGNOSIS — Z23 NEED FOR INFLUENZA VACCINATION: ICD-10-CM

## 2020-09-10 DIAGNOSIS — I10 ESSENTIAL HYPERTENSION: ICD-10-CM

## 2020-09-10 DIAGNOSIS — R21 RASH AND NONSPECIFIC SKIN ERUPTION: Primary | ICD-10-CM

## 2020-09-10 DIAGNOSIS — H61.23 BILATERAL IMPACTED CERUMEN: ICD-10-CM

## 2020-09-10 PROCEDURE — G0008 ADMIN INFLUENZA VIRUS VAC: HCPCS | Performed by: PHYSICIAN ASSISTANT

## 2020-09-10 PROCEDURE — 99214 OFFICE O/P EST MOD 30 MIN: CPT | Performed by: PHYSICIAN ASSISTANT

## 2020-09-10 PROCEDURE — 69209 REMOVE IMPACTED EAR WAX UNI: CPT | Performed by: PHYSICIAN ASSISTANT

## 2020-09-10 PROCEDURE — 90662 IIV NO PRSV INCREASED AG IM: CPT | Performed by: PHYSICIAN ASSISTANT

## 2020-09-10 RX ORDER — CEPHALEXIN 500 MG/1
500 CAPSULE ORAL 2 TIMES DAILY
Qty: 14 CAPSULE | Refills: 0 | Status: SHIPPED | OUTPATIENT
Start: 2020-09-10 | End: 2020-09-17

## 2020-09-10 NOTE — PROGRESS NOTES
Patient presents with:  Rash: GEURDA rm 6 rash in groin came back x 1-2 weeks  Ear Problem: feels like hearing has weakened        HPI:   Pt presents with c/o a rash in his groin that has recurred over the last couple of weeks.   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          Physical Exam  /66 (BP Location: Right arm, Patient Position: Sitting, Cuff Size: adult)   Pulse 76   Temp 97.2 °F (36.2 °C) (Oral)   Ht 66\"   Wt 206 lb (93.4 kg)   BMI 33.25 kg/m²   Constit

## 2020-09-11 PROBLEM — F01.50 VASCULAR DEMENTIA WITHOUT BEHAVIORAL DISTURBANCE (HCC): Status: ACTIVE | Noted: 2020-09-11

## 2020-09-11 NOTE — PROGRESS NOTES
Patient presents with:  Rash: GUERDA rm 6 rash in groin came back x 1-2 weeks  Ear Problem: feels like hearing has weakened        HPI:   Pt presents with c/o a rash in his groin that has recurred over the last couple of weeks.   States was treated with a powde Herniated lumbar intervertebral disc Rt L4-L5 with nerve root compression - Rx GEOFF ZHANNA Blum     Spondylosis of cervical region without myelopathy or radiculopathy     BPH associated with nocturia / Rx: Doxazosin     CMC thumb arthritis / Rx steroid inje (0.4 mg total) under the tongue every 5 (five) minutes as needed for Chest pain. Call physician 100 tablet 0   • Clopidogrel Bisulfate (PLAVIX) 75 MG Oral Tab Take 75 mg by mouth daily.          Physical Exam  /66 (BP Location: Right arm, Patient Posi procedure note. Bilateral impacted cerumen - Removed without incident, see above. Vascular dementia - Pt following with Angelamaryellen, on Aricept. Spent some time listening to his frustrations and encouraging him.       Orders Placed This Encounter      Flu

## 2020-09-29 ENCOUNTER — PATIENT OUTREACH (OUTPATIENT)
Dept: CASE MANAGEMENT | Age: 70
End: 2020-09-29

## 2020-09-29 ENCOUNTER — LAB ENCOUNTER (OUTPATIENT)
Dept: LAB | Facility: HOSPITAL | Age: 70
End: 2020-09-29
Attending: INTERNAL MEDICINE
Payer: MEDICARE

## 2020-09-29 DIAGNOSIS — M06.042 RHEUMATOID ARTHRITIS INVOLVING BOTH HANDS WITH NEGATIVE RHEUMATOID FACTOR (HCC): ICD-10-CM

## 2020-09-29 DIAGNOSIS — M06.041 RHEUMATOID ARTHRITIS INVOLVING BOTH HANDS WITH NEGATIVE RHEUMATOID FACTOR (HCC): ICD-10-CM

## 2020-09-29 DIAGNOSIS — I10 ESSENTIAL HYPERTENSION: ICD-10-CM

## 2020-09-29 DIAGNOSIS — Z12.5 SCREENING PSA (PROSTATE SPECIFIC ANTIGEN): ICD-10-CM

## 2020-09-29 DIAGNOSIS — I25.118 CORONARY ARTERY DISEASE OF NATIVE ARTERY OF NATIVE HEART WITH STABLE ANGINA PECTORIS (HCC): ICD-10-CM

## 2020-09-29 PROCEDURE — 36415 COLL VENOUS BLD VENIPUNCTURE: CPT

## 2020-09-29 PROCEDURE — 80053 COMPREHEN METABOLIC PANEL: CPT

## 2020-09-29 PROCEDURE — 84443 ASSAY THYROID STIM HORMONE: CPT

## 2020-09-29 PROCEDURE — 85025 COMPLETE CBC W/AUTO DIFF WBC: CPT

## 2020-09-29 PROCEDURE — 80061 LIPID PANEL: CPT

## 2020-09-29 PROCEDURE — 83036 HEMOGLOBIN GLYCOSYLATED A1C: CPT

## 2020-09-29 NOTE — PROGRESS NOTES
Called patient for CCM outreach. No answer left voicemail to call back. Time Spent This Encounter Total: 3 minutes medical record review, telephone communication, care plan updates where needed, education, goals and action plan recreation/update.  Provid

## 2020-10-01 ENCOUNTER — V-VISIT (OUTPATIENT)
Dept: CARDIOLOGY | Age: 70
End: 2020-10-01
Attending: INTERNAL MEDICINE

## 2020-10-01 DIAGNOSIS — I10 BENIGN ESSENTIAL HYPERTENSION: ICD-10-CM

## 2020-10-01 DIAGNOSIS — I20.89 CHRONIC STABLE ANGINA: ICD-10-CM

## 2020-10-01 DIAGNOSIS — F02.80 DEMENTIA ASSOCIATED WITH OTHER UNDERLYING DISEASE WITHOUT BEHAVIORAL DISTURBANCE (CMD): ICD-10-CM

## 2020-10-01 DIAGNOSIS — Z95.5 S/P PRIMARY ANGIOPLASTY WITH CORONARY STENT: ICD-10-CM

## 2020-10-01 DIAGNOSIS — E78.2 MIXED HYPERLIPIDEMIA: ICD-10-CM

## 2020-10-01 DIAGNOSIS — I25.118 ATHEROSCLEROSIS OF NATIVE CORONARY ARTERY OF NATIVE HEART WITH STABLE ANGINA PECTORIS (CMD): Primary | ICD-10-CM

## 2020-10-01 DIAGNOSIS — Z95.1 HX OF CABG: ICD-10-CM

## 2020-10-01 PROCEDURE — 99214 OFFICE O/P EST MOD 30 MIN: CPT | Performed by: INTERNAL MEDICINE

## 2020-10-01 RX ORDER — PRAVASTATIN SODIUM 80 MG/1
TABLET ORAL
COMMUNITY
Start: 2020-08-06 | End: 2020-12-10 | Stop reason: SDUPTHER

## 2020-10-01 RX ORDER — LANSOPRAZOLE 30 MG/1
CAPSULE, DELAYED RELEASE ORAL
COMMUNITY
Start: 2020-08-06 | End: 2021-07-08 | Stop reason: ALTCHOICE

## 2020-10-01 RX ORDER — CLONAZEPAM 2 MG/1
2 TABLET ORAL DAILY
COMMUNITY
End: 2020-10-26 | Stop reason: DRUGHIGH

## 2020-10-01 RX ORDER — ASPIRIN 325 MG
325 TABLET ORAL DAILY
COMMUNITY
End: 2023-04-25

## 2020-10-01 RX ORDER — ISOSORBIDE MONONITRATE 120 MG/1
TABLET, EXTENDED RELEASE ORAL
COMMUNITY
End: 2020-12-10 | Stop reason: SDUPTHER

## 2020-10-01 RX ORDER — DOXAZOSIN 8 MG/1
8 TABLET ORAL NIGHTLY
COMMUNITY
Start: 2020-09-15 | End: 2021-07-08 | Stop reason: ALTCHOICE

## 2020-10-01 RX ORDER — DONEPEZIL HYDROCHLORIDE 5 MG/1
5 TABLET, ORALLY DISINTEGRATING ORAL 2 TIMES DAILY
COMMUNITY
Start: 2020-08-04 | End: 2020-12-10 | Stop reason: SDUPTHER

## 2020-10-01 RX ORDER — NITROGLYCERIN 0.4 MG/1
0.4 TABLET SUBLINGUAL EVERY 5 MIN PRN
COMMUNITY
End: 2020-12-10 | Stop reason: SDUPTHER

## 2020-10-01 SDOH — HEALTH STABILITY: PHYSICAL HEALTH: ON AVERAGE, HOW MANY DAYS PER WEEK DO YOU ENGAGE IN MODERATE TO STRENUOUS EXERCISE (LIKE A BRISK WALK)?: 0 DAYS

## 2020-10-01 SDOH — HEALTH STABILITY: PHYSICAL HEALTH: ON AVERAGE, HOW MANY MINUTES DO YOU ENGAGE IN EXERCISE AT THIS LEVEL?: 0 MIN

## 2020-10-01 SDOH — HEALTH STABILITY: MENTAL HEALTH: HOW OFTEN DO YOU HAVE A DRINK CONTAINING ALCOHOL?: NEVER

## 2020-10-01 ASSESSMENT — ENCOUNTER SYMPTOMS
MEMORY LOSS: 1
FEVER: 0
WEIGHT LOSS: 0
DIFFICULTY WITH CONCENTRATION: 1
BRUISES/BLEEDS EASILY: 0
HEMOPTYSIS: 0
ALLERGIC/IMMUNOLOGIC COMMENTS: NO NEW FOOD ALLERGIES
WEIGHT GAIN: 0
COUGH: 0
HEMATOCHEZIA: 0
CHILLS: 0
SUSPICIOUS LESIONS: 0

## 2020-10-05 DIAGNOSIS — E78.00 ELEVATED CHOLESTEROL: Primary | ICD-10-CM

## 2020-10-13 RX ORDER — METOPROLOL SUCCINATE 50 MG/1
50 TABLET, EXTENDED RELEASE ORAL 2 TIMES DAILY
Qty: 180 TABLET | Refills: 3 | Status: SHIPPED | OUTPATIENT
Start: 2020-10-13 | End: 2021-11-11

## 2020-10-24 ENCOUNTER — TELEPHONE (OUTPATIENT)
Dept: CARDIOLOGY | Age: 70
End: 2020-10-24

## 2020-10-24 DIAGNOSIS — F41.9 ANXIETY: Primary | ICD-10-CM

## 2020-10-26 ENCOUNTER — TELEPHONE (OUTPATIENT)
Dept: CARDIOLOGY | Age: 70
End: 2020-10-26

## 2020-10-26 RX ORDER — CLONAZEPAM 1 MG/1
1 TABLET ORAL AT BEDTIME
Qty: 90 TABLET | Refills: 2 | Status: SHIPPED | OUTPATIENT
Start: 2020-10-26 | End: 2020-10-28 | Stop reason: SDUPTHER

## 2020-10-26 RX ORDER — CLONAZEPAM 1 MG/1
TABLET ORAL
Qty: 90 TABLET | Refills: 0 | OUTPATIENT
Start: 2020-10-26

## 2020-10-28 DIAGNOSIS — F01.50 VASCULAR DEMENTIA WITHOUT BEHAVIORAL DISTURBANCE (HCC): ICD-10-CM

## 2020-10-28 RX ORDER — DONEPEZIL HYDROCHLORIDE 10 MG/1
10 TABLET, ORALLY DISINTEGRATING ORAL NIGHTLY
Qty: 60 TABLET | Refills: 5 | Status: SHIPPED | OUTPATIENT
Start: 2020-10-28

## 2020-10-28 RX ORDER — CLONAZEPAM 1 MG/1
1 TABLET ORAL AT BEDTIME
Qty: 90 TABLET | Refills: 2 | Status: SHIPPED | OUTPATIENT
Start: 2020-10-28 | End: 2021-08-06 | Stop reason: SDUPTHER

## 2020-10-28 NOTE — TELEPHONE ENCOUNTER
Medication: Donepezil 10 mg  Date of last refill: 08/04/20 with 2 addt refills  [place]     Last office visit: 08/04/20  Due back to clinic per last office note:  RTN in 4 months  Date next office visit scheduled:     Fut of hallucinations/delusions and/or personality changes.   Patient and family were advised to notify office with any new or worsening symptoms.     (F01.50) Vascular dementia without behavioral disturbance ([facility] [address]  (primary encounter diagnosis)  Plan: Cuca Elizondo

## 2020-11-05 ENCOUNTER — TELEPHONE (OUTPATIENT)
Dept: INTERNAL MEDICINE CLINIC | Facility: CLINIC | Age: 70
End: 2020-11-05

## 2020-11-05 NOTE — TELEPHONE ENCOUNTER
Wellness   Future Appointments   Date Time Provider Edison Dey   11/12/2020  3:30 PM Laurie Haro PA-C [facility]      Orders to [facility] fax#[phone] ph#[phone] aware must fast no call back required

## 2020-11-05 NOTE — TELEPHONE ENCOUNTER
Pt has pending Lipid and CMP. Pt had CBC, CMP, Lipid, A1c, TSH w Ref, and PSA done 9/29/20. Please advise if pt needs additional labs prior to OV.

## 2020-11-06 ENCOUNTER — TELEPHONE (OUTPATIENT)
Dept: CARDIOLOGY | Age: 70
End: 2020-11-06

## 2020-11-09 RX ORDER — PRAVASTATIN SODIUM 80 MG/1
TABLET ORAL
Qty: 90 TABLET | Refills: 0 | Status: SHIPPED | OUTPATIENT
Start: 2020-11-09 | End: 2021-02-10

## 2020-11-09 RX ORDER — ISOSORBIDE MONONITRATE 60 MG/1
TABLET, EXTENDED RELEASE ORAL
Qty: 90 TABLET | Refills: 11 | Status: SHIPPED | OUTPATIENT
Start: 2020-11-09 | End: 2021-11-11

## 2020-11-09 RX ORDER — LANSOPRAZOLE 30 MG/1
CAPSULE, DELAYED RELEASE ORAL
Qty: 90 CAPSULE | Refills: 0 | Status: SHIPPED | OUTPATIENT
Start: 2020-11-09 | End: 2021-03-04

## 2020-11-09 NOTE — TELEPHONE ENCOUNTER
Pravastatin-PASSED per protocol, refill sent     Last OV 9.10.20 w/ CB (acute)   Last PE 10.15.19-PE scheduled for 11.12.20   Last REFILL 8.6.20 Lansoprazole 30mg #90 0R  Last LABS 9.29.20 PSA, TSH w/reflex, HgA1c, Lipid, CMP, CBC    Future Appointments

## 2020-11-11 ENCOUNTER — TELEPHONE (OUTPATIENT)
Dept: INTERNAL MEDICINE CLINIC | Facility: CLINIC | Age: 70
End: 2020-11-11

## 2020-11-12 ENCOUNTER — OFFICE VISIT (OUTPATIENT)
Dept: INTERNAL MEDICINE CLINIC | Facility: CLINIC | Age: 70
End: 2020-11-12
Payer: MEDICARE

## 2020-11-12 VITALS
TEMPERATURE: 98 F | HEART RATE: 75 BPM | HEIGHT: 66 IN | DIASTOLIC BLOOD PRESSURE: 68 MMHG | RESPIRATION RATE: 16 BRPM | WEIGHT: 208 LBS | BODY MASS INDEX: 33.43 KG/M2 | SYSTOLIC BLOOD PRESSURE: 112 MMHG

## 2020-11-12 DIAGNOSIS — Z95.1 HX OF CABG: ICD-10-CM

## 2020-11-12 DIAGNOSIS — I10 ESSENTIAL HYPERTENSION: ICD-10-CM

## 2020-11-12 DIAGNOSIS — R97.20 ELEVATED PSA: ICD-10-CM

## 2020-11-12 DIAGNOSIS — K22.10 EROSIVE ESOPHAGITIS: ICD-10-CM

## 2020-11-12 DIAGNOSIS — M75.01 ADHESIVE CAPSULITIS OF RIGHT SHOULDER: ICD-10-CM

## 2020-11-12 DIAGNOSIS — E78.00 ELEVATED CHOLESTEROL: ICD-10-CM

## 2020-11-12 DIAGNOSIS — F01.50 VASCULAR DEMENTIA WITHOUT BEHAVIORAL DISTURBANCE (HCC): ICD-10-CM

## 2020-11-12 DIAGNOSIS — K76.0 STEATOSIS OF LIVER: ICD-10-CM

## 2020-11-12 DIAGNOSIS — R21 RASH AND NONSPECIFIC SKIN ERUPTION: ICD-10-CM

## 2020-11-12 DIAGNOSIS — Z12.11 COLON CANCER SCREENING: ICD-10-CM

## 2020-11-12 DIAGNOSIS — I25.118 CORONARY ARTERY DISEASE OF NATIVE ARTERY OF NATIVE HEART WITH STABLE ANGINA PECTORIS (HCC): ICD-10-CM

## 2020-11-12 DIAGNOSIS — Z98.61 S/P PTCA (PERCUTANEOUS TRANSLUMINAL CORONARY ANGIOPLASTY): ICD-10-CM

## 2020-11-12 DIAGNOSIS — I63.81 LACUNAR INFARCTION (HCC): ICD-10-CM

## 2020-11-12 DIAGNOSIS — Z00.00 WELLNESS EXAMINATION: Primary | ICD-10-CM

## 2020-11-12 PROCEDURE — G0439 PPPS, SUBSEQ VISIT: HCPCS | Performed by: PHYSICIAN ASSISTANT

## 2020-11-12 PROCEDURE — 99213 OFFICE O/P EST LOW 20 MIN: CPT | Performed by: PHYSICIAN ASSISTANT

## 2020-11-12 RX ORDER — MOMETASONE FUROATE 1 MG/G
1 CREAM TOPICAL DAILY
Qty: 45 G | Refills: 0 | Status: SHIPPED | OUTPATIENT
Start: 2020-11-12 | End: 2022-01-21 | Stop reason: CLARIF

## 2020-11-12 NOTE — PATIENT INSTRUCTIONS
Ernesto Jovel Jr.'s SCREENING SCHEDULE   Tests on this list are recommended by your physician but may not be covered, or covered at this frequency, by your insurer. Please check with your insurance carrier before scheduling to verify coverage.     PRE between ages 73-68)  No results found for this or any previous visit.  Limited to patients who meet one of the following criteria:   • Men who are 73-68 years old and have smoked more than 100 cigarettes in their lifetime   • Anyone with a family history VIII or IX concentrates   Clients of institutions for the mentally retarded   Persons who live in the same house as a HepB virus carrier   Homosexual men   Illicit injectable drug abusers     Tetanus Toxoid- Only covered with a cut with metal- TD and TDaP

## 2020-11-12 NOTE — PROGRESS NOTES
HPI:   Winston Castañeda. is a 69year old male who presents for a Medicare Subsequent Annual Wellness visit (Pt already had Initial Annual Wellness). Pt is c/o a rash on his lower abd and in his groin areas that is bothersome.   I saw him for this have a Living Will on file in [address] [place] [address] Rd. The patient has this document but we do not have it in [facility], and patient is instructed to get our office a copy of it for scanning into Epic. He does NOT have a Power of  for [employer] on file in [address] [place] [address] Rd. colon cancer screening. Last EGD was 2017. On PPI with no complaints. H/O erosive esophagitis as well. H/O [6/06] C.  Difficile Enteritis without antecedent antibiotics      ANNUAL MEDICARE WELLNESS VISIT - 9/13/16*     Adhesive capsulitis of right s 1.540 09/29/2020    CREATSERUM 0.98 11/06/2020    GLU 81 11/06/2020        CBC  (most recent labs)   Lab Results   Component Value Date    WBC 9.0 09/29/2020    HGB 14.3 09/29/2020    .0 09/29/2020        ALLERGIES:   He has No Known Allergies.     C Rheumatoid arthritis ([facility] [address] Shortness of breath, and Stented coronary artery.     He  has a past surgical history that includes colonoscopy; other surgical history; other surgical history; hernia surgery (2005); injection, anesthetic/steroid, transforaminal pain  NEURO: denies headaches  PSYCHE: denies depression or anxiety  HEMATOLOGIC: denies hx of anemia  ENDOCRINE: denies thyroid history  ALL/ASTHMA: denies hx of allergy or asthma    EXAM:   /68 (BP Location: Left arm, Patient Position: Sitting, Cuf Older PRSV Free (00599) 09/01/2019, 09/10/2020   • Kenalog Per 10mg Inj 06/21/2016   • Pneumococcal (Prevnar 13) 10/17/2014   • Pneumovax 23 10/15/2019        ASSESSMENT AND OTHER RELEVANT CHRONIC CONDITIONS:   Henry KempKerwin is a 69year old male w does the patient maintain a good energy level?: Other  How would you describe your daily physical activity?: Moderate  How would you describe your current health state?: Good  How do you maintain positive mental well-being?: Visiting Family    This section your 64th birthday    Hepatitis B for Moderate/High Risk No vaccine history found Medium/high risk factors:   End-stage renal disease   Hemophiliacs who received Factor VIII or IX concentrates   Clients of institutions for the mentally retarded   Persons w

## 2020-12-07 ENCOUNTER — TELEPHONE (OUTPATIENT)
Dept: NEUROLOGY | Age: 70
End: 2020-12-07

## 2020-12-10 ENCOUNTER — MED REC SCAN ONLY (OUTPATIENT)
Dept: INTERNAL MEDICINE CLINIC | Facility: CLINIC | Age: 70
End: 2020-12-10

## 2020-12-10 ENCOUNTER — OFFICE VISIT (OUTPATIENT)
Dept: NEUROLOGY | Age: 70
End: 2020-12-10
Attending: INTERNAL MEDICINE

## 2020-12-10 VITALS
DIASTOLIC BLOOD PRESSURE: 75 MMHG | BODY MASS INDEX: 33.61 KG/M2 | SYSTOLIC BLOOD PRESSURE: 127 MMHG | HEART RATE: 71 BPM | WEIGHT: 208.22 LBS

## 2020-12-10 DIAGNOSIS — G47.52 REM BEHAVIORAL DISORDER: ICD-10-CM

## 2020-12-10 DIAGNOSIS — I63.81 LACUNAR INFARCTION (CMD): ICD-10-CM

## 2020-12-10 DIAGNOSIS — G47.19 EXCESSIVE DAYTIME SLEEPINESS: ICD-10-CM

## 2020-12-10 DIAGNOSIS — R41.3 MEMORY LOSS: Primary | ICD-10-CM

## 2020-12-10 PROBLEM — F01.50 VASCULAR DEMENTIA WITHOUT BEHAVIORAL DISTURBANCE  (CMD): Status: ACTIVE | Noted: 2020-09-11

## 2020-12-10 PROBLEM — M65.4 DE QUERVAIN'S DISEASE (TENOSYNOVITIS): Status: ACTIVE | Noted: 2018-12-18

## 2020-12-10 PROBLEM — R26.81 GAIT INSTABILITY: Status: ACTIVE | Noted: 2019-02-26

## 2020-12-10 PROBLEM — F41.9 ANXIETY: Status: ACTIVE | Noted: 2019-06-04

## 2020-12-10 PROBLEM — I21.4 NSTEMI (NON-ST ELEVATED MYOCARDIAL INFARCTION)  (CMD): Status: ACTIVE | Noted: 2019-06-04

## 2020-12-10 PROBLEM — Z98.890 S/P COLONOSCOPY: Status: ACTIVE | Noted: 2018-05-31

## 2020-12-10 PROBLEM — R47.89 WORD FINDING DIFFICULTY: Status: ACTIVE | Noted: 2019-02-26

## 2020-12-10 PROBLEM — M06.042 RHEUMATOID ARTHRITIS INVOLVING BOTH HANDS WITH NEGATIVE RHEUMATOID FACTOR (CMD): Status: ACTIVE | Noted: 2018-11-01

## 2020-12-10 PROBLEM — K76.0 STEATOSIS OF LIVER: Status: ACTIVE | Noted: 2018-04-28

## 2020-12-10 PROBLEM — M06.041 RHEUMATOID ARTHRITIS INVOLVING BOTH HANDS WITH NEGATIVE RHEUMATOID FACTOR  (CMD): Status: ACTIVE | Noted: 2018-11-01

## 2020-12-10 PROBLEM — Z86.73 HISTORY OF TRANSIENT ISCHEMIC ATTACK (TIA): Status: ACTIVE | Noted: 2019-06-04

## 2020-12-10 PROBLEM — E66.9 OBESITY: Status: ACTIVE | Noted: 2019-06-04

## 2020-12-10 PROBLEM — K21.9 GERD (GASTROESOPHAGEAL REFLUX DISEASE): Status: ACTIVE | Noted: 2019-06-04

## 2020-12-10 PROBLEM — I50.42 CHRONIC COMBINED SYSTOLIC AND DIASTOLIC HEART FAILURE (CMD): Status: ACTIVE | Noted: 2019-06-04

## 2020-12-10 PROBLEM — I45.2 RIGHT BBB/LEFT ANT FASC BLOCK: Status: ACTIVE | Noted: 2019-06-04

## 2020-12-10 PROCEDURE — 99205 OFFICE O/P NEW HI 60 MIN: CPT | Performed by: PSYCHIATRY & NEUROLOGY

## 2020-12-10 RX ORDER — DONEPEZIL HYDROCHLORIDE 10 MG/1
10 TABLET, FILM COATED ORAL DAILY
COMMUNITY
Start: 2020-10-28 | End: 2021-02-25 | Stop reason: SDUPTHER

## 2020-12-10 RX ORDER — MOMETASONE FUROATE 1 MG/G
CREAM TOPICAL
COMMUNITY
Start: 2020-11-12 | End: 2023-03-30 | Stop reason: ALTCHOICE

## 2020-12-10 SDOH — HEALTH STABILITY: MENTAL HEALTH: HOW OFTEN DO YOU HAVE 6 OR MORE DRINKS ON ONE OCCASION?: NEVER

## 2020-12-10 SDOH — HEALTH STABILITY: MENTAL HEALTH: HOW MANY STANDARD DRINKS CONTAINING ALCOHOL DO YOU HAVE ON A TYPICAL DAY?: 1 OR 2

## 2020-12-10 SDOH — HEALTH STABILITY: MENTAL HEALTH: HOW OFTEN DO YOU HAVE A DRINK CONTAINING ALCOHOL?: MONTHLY OR LESS

## 2020-12-10 ASSESSMENT — ENCOUNTER SYMPTOMS
HEMATOLOGIC/LYMPHATIC NEGATIVE: 1
RESPIRATORY NEGATIVE: 1
GASTROINTESTINAL NEGATIVE: 1
EYES NEGATIVE: 1
PSYCHIATRIC NEGATIVE: 1
ENDOCRINE NEGATIVE: 1
HEADACHES: 1
CONSTITUTIONAL NEGATIVE: 1

## 2020-12-11 ENCOUNTER — DOCUMENTATION ONLY (OUTPATIENT)
Dept: CASE MANAGEMENT | Age: 70
End: 2020-12-11

## 2020-12-11 ENCOUNTER — TELEPHONE (OUTPATIENT)
Dept: NEUROLOGY | Age: 70
End: 2020-12-11

## 2020-12-12 PROBLEM — G47.19 EXCESSIVE DAYTIME SLEEPINESS: Status: ACTIVE | Noted: 2020-12-12

## 2020-12-14 NOTE — PROGRESS NOTES
Received consult/progress note from [facility] [place]. Placed on provider AS desk for review.

## 2020-12-18 ENCOUNTER — PATIENT OUTREACH (OUTPATIENT)
Dept: CASE MANAGEMENT | Age: 70
End: 2020-12-18

## 2020-12-18 DIAGNOSIS — E78.00 ELEVATED CHOLESTEROL: ICD-10-CM

## 2020-12-18 DIAGNOSIS — G47.33 OBSTRUCTIVE SLEEP APNEA: ICD-10-CM

## 2020-12-18 DIAGNOSIS — I25.118 CORONARY ARTERY DISEASE OF NATIVE ARTERY OF NATIVE HEART WITH STABLE ANGINA PECTORIS (HCC): ICD-10-CM

## 2020-12-18 DIAGNOSIS — I10 ESSENTIAL HYPERTENSION: ICD-10-CM

## 2020-12-18 DIAGNOSIS — M65.4 DE QUERVAIN'S DISEASE (TENOSYNOVITIS): ICD-10-CM

## 2020-12-18 DIAGNOSIS — F01.50 VASCULAR DEMENTIA WITHOUT BEHAVIORAL DISTURBANCE (HCC): ICD-10-CM

## 2020-12-18 DIAGNOSIS — M06.041 RHEUMATOID ARTHRITIS INVOLVING BOTH HANDS WITH NEGATIVE RHEUMATOID FACTOR (HCC): ICD-10-CM

## 2020-12-18 DIAGNOSIS — Z95.5 HISTORY OF CORONARY ARTERY STENT PLACEMENT: ICD-10-CM

## 2020-12-18 DIAGNOSIS — M06.042 RHEUMATOID ARTHRITIS INVOLVING BOTH HANDS WITH NEGATIVE RHEUMATOID FACTOR (HCC): ICD-10-CM

## 2020-12-18 DIAGNOSIS — M19.049 CMC ARTHRITIS: ICD-10-CM

## 2020-12-18 NOTE — PROGRESS NOTES
Spoke to Bryan at [facility] about CCM, current care plan and performed CCM assessment with Bryan reviewed meds and compliance.    Interventions for following categories based on assessment    Saw a doctor at [facility] (Dr. Diaz Sinclair he thinks) for [facility] your _yes__________ (dm, HTN, etc)  • Would you like more info on anything? No, not at this time. Goals:  • What would you say is your biggest concerns about your health?  Health     Follow up:  • You can call me anytime you have a question or need hel needed, and education. Provided acknowledgment and validation to patient's concerns.    Monthly Minute Total including today: 20    Physical assessment, complete health history, and need for CCM established by Aparna Blevins MD.

## 2020-12-31 PROCEDURE — 99490 CHRNC CARE MGMT STAFF 1ST 20: CPT

## 2021-01-01 ENCOUNTER — EXTERNAL RECORD (OUTPATIENT)
Dept: HEALTH INFORMATION MANAGEMENT | Facility: OTHER | Age: 71
End: 2021-01-01

## 2021-01-12 ENCOUNTER — PATIENT OUTREACH (OUTPATIENT)
Dept: CASE MANAGEMENT | Age: 71
End: 2021-01-12

## 2021-01-12 DIAGNOSIS — Z95.5 HISTORY OF CORONARY ARTERY STENT PLACEMENT: ICD-10-CM

## 2021-01-12 DIAGNOSIS — G47.33 OBSTRUCTIVE SLEEP APNEA: ICD-10-CM

## 2021-01-12 DIAGNOSIS — E78.00 ELEVATED CHOLESTEROL: ICD-10-CM

## 2021-01-12 DIAGNOSIS — M65.4 DE QUERVAIN'S DISEASE (TENOSYNOVITIS): ICD-10-CM

## 2021-01-12 DIAGNOSIS — M06.041 RHEUMATOID ARTHRITIS INVOLVING BOTH HANDS WITH NEGATIVE RHEUMATOID FACTOR (HCC): ICD-10-CM

## 2021-01-12 DIAGNOSIS — I10 ESSENTIAL HYPERTENSION: ICD-10-CM

## 2021-01-12 DIAGNOSIS — M06.042 RHEUMATOID ARTHRITIS INVOLVING BOTH HANDS WITH NEGATIVE RHEUMATOID FACTOR (HCC): ICD-10-CM

## 2021-01-12 DIAGNOSIS — F01.50 VASCULAR DEMENTIA WITHOUT BEHAVIORAL DISTURBANCE (HCC): ICD-10-CM

## 2021-01-12 DIAGNOSIS — I25.118 CORONARY ARTERY DISEASE OF NATIVE ARTERY OF NATIVE HEART WITH STABLE ANGINA PECTORIS (HCC): ICD-10-CM

## 2021-01-12 DIAGNOSIS — M19.049 CMC ARTHRITIS: ICD-10-CM

## 2021-01-12 RX ORDER — DOXAZOSIN 8 MG/1
8 TABLET ORAL NIGHTLY
COMMUNITY
Start: 2020-09-15 | End: 2021-01-12

## 2021-01-12 RX ORDER — CETIRIZINE HYDROCHLORIDE 10 MG/1
10 TABLET ORAL DAILY PRN
COMMUNITY

## 2021-01-12 NOTE — PROGRESS NOTES
Memory is not worse but not better. Pt states his physician advised the [facility] is on [facility] but in the near future he will be going for an MRI and a test for his memory it takes about three hours.     Spoke to [facility] from Dr. Jasmine Zamora offic

## 2021-01-14 ENCOUNTER — APPOINTMENT (OUTPATIENT)
Dept: NEUROLOGY | Age: 71
End: 2021-01-14

## 2021-01-17 ENCOUNTER — DOCUMENTATION (OUTPATIENT)
Dept: NEUROLOGY | Age: 71
End: 2021-01-17

## 2021-01-31 PROCEDURE — 99490 CHRNC CARE MGMT STAFF 1ST 20: CPT

## 2021-02-01 DIAGNOSIS — Z23 NEED FOR VACCINATION: ICD-10-CM

## 2021-02-10 RX ORDER — PRAVASTATIN SODIUM 80 MG/1
TABLET ORAL
Qty: 90 TABLET | Refills: 0 | Status: SHIPPED | OUTPATIENT
Start: 2021-02-10 | End: 2021-05-17

## 2021-02-12 ENCOUNTER — TELEPHONE (OUTPATIENT)
Dept: CASE MANAGEMENT | Age: 71
End: 2021-02-12

## 2021-02-12 ENCOUNTER — PATIENT OUTREACH (OUTPATIENT)
Dept: CASE MANAGEMENT | Age: 71
End: 2021-02-12

## 2021-02-12 DIAGNOSIS — I10 ESSENTIAL HYPERTENSION: ICD-10-CM

## 2021-02-12 DIAGNOSIS — M19.049 CMC ARTHRITIS: ICD-10-CM

## 2021-02-12 DIAGNOSIS — E78.00 ELEVATED CHOLESTEROL: ICD-10-CM

## 2021-02-12 DIAGNOSIS — F01.50 VASCULAR DEMENTIA WITHOUT BEHAVIORAL DISTURBANCE (HCC): ICD-10-CM

## 2021-02-12 DIAGNOSIS — Z95.1 HX OF CABG: ICD-10-CM

## 2021-02-12 DIAGNOSIS — G47.33 OBSTRUCTIVE SLEEP APNEA: ICD-10-CM

## 2021-02-12 DIAGNOSIS — M65.4 DE QUERVAIN'S DISEASE (TENOSYNOVITIS): ICD-10-CM

## 2021-02-12 DIAGNOSIS — Z98.61 S/P PTCA (PERCUTANEOUS TRANSLUMINAL CORONARY ANGIOPLASTY): ICD-10-CM

## 2021-02-12 DIAGNOSIS — Z95.5 HISTORY OF CORONARY ARTERY STENT PLACEMENT: ICD-10-CM

## 2021-02-12 DIAGNOSIS — I25.118 CORONARY ARTERY DISEASE OF NATIVE ARTERY OF NATIVE HEART WITH STABLE ANGINA PECTORIS (HCC): ICD-10-CM

## 2021-02-12 NOTE — TELEPHONE ENCOUNTER
Patient states he is not on mYCHART. Pt notified AS recommends him to get the COVID vaccine when the opportunity arises to schedule.   Pt notified [address] [facility] [address] would be contacting him via a call or letter on how to schedule or he could try to schedule with the Count

## 2021-02-12 NOTE — TELEPHONE ENCOUNTER
Triage: Happy Friday :)     Pt voices some concerns about Covid vaccine states he is unsure and wrestling with his decision on receiving vaccine. Pt would like to know If any of his medications will cause him to have a reaction to the vaccine.   Notified I

## 2021-02-12 NOTE — PROGRESS NOTES
2/12/2021  Spoke to Bryan for CCM. Updates to patient care team/comments: UTD. Patient reported changes in medications: together we reviewed with no updates. Med Adherence  Comment: pt reports taking all medications as prescribed.        Health barriers towards patients goals. Care Managers Interventions: Continue to provide encouragement, support, and education for healthy coping and dx.   Time Spent This Encounter Total: 23 min medical record review, telephone communication, care plan update

## 2021-02-16 RX ORDER — METOPROLOL SUCCINATE 50 MG/1
50 TABLET, EXTENDED RELEASE ORAL 2 TIMES DAILY
Qty: 60 TABLET | Refills: 0 | OUTPATIENT
Start: 2021-02-16

## 2021-02-25 ENCOUNTER — TELEPHONE (OUTPATIENT)
Dept: NEUROLOGY | Age: 71
End: 2021-02-25

## 2021-02-25 RX ORDER — DONEPEZIL HYDROCHLORIDE 10 MG/1
10 TABLET, FILM COATED ORAL DAILY
Qty: 90 TABLET | Refills: 1 | Status: SHIPPED | OUTPATIENT
Start: 2021-02-25 | End: 2021-08-31

## 2021-02-28 PROCEDURE — 99490 CHRNC CARE MGMT STAFF 1ST 20: CPT

## 2021-03-04 RX ORDER — LANSOPRAZOLE 30 MG/1
CAPSULE, DELAYED RELEASE ORAL
Qty: 90 CAPSULE | Refills: 0 | Status: SHIPPED | OUTPATIENT
Start: 2021-03-04 | End: 2021-06-23

## 2021-03-04 NOTE — TELEPHONE ENCOUNTER
Last OV 11.12.20 w/ CB (annual pe)   Last PE 11.12.20   Last REFILL 11.9.20 Lansoprazole 30mg #90 0R  Last LABS 9.29.20 PSA, TSH w/reflex, HgA1c, Lipid, CMP, CBC    No future appointments. Per PROTOCOL?  Not on protocol     Please Advise

## 2021-03-11 ENCOUNTER — PATIENT OUTREACH (OUTPATIENT)
Dept: CASE MANAGEMENT | Age: 71
End: 2021-03-11

## 2021-03-11 DIAGNOSIS — M06.041 RHEUMATOID ARTHRITIS INVOLVING BOTH HANDS WITH NEGATIVE RHEUMATOID FACTOR (HCC): ICD-10-CM

## 2021-03-11 DIAGNOSIS — I10 ESSENTIAL HYPERTENSION: ICD-10-CM

## 2021-03-11 DIAGNOSIS — M06.042 RHEUMATOID ARTHRITIS INVOLVING BOTH HANDS WITH NEGATIVE RHEUMATOID FACTOR (HCC): ICD-10-CM

## 2021-03-11 DIAGNOSIS — F01.50 VASCULAR DEMENTIA WITHOUT BEHAVIORAL DISTURBANCE (HCC): ICD-10-CM

## 2021-03-11 DIAGNOSIS — G47.33 OBSTRUCTIVE SLEEP APNEA: ICD-10-CM

## 2021-03-11 DIAGNOSIS — I25.118 CORONARY ARTERY DISEASE OF NATIVE ARTERY OF NATIVE HEART WITH STABLE ANGINA PECTORIS (HCC): ICD-10-CM

## 2021-03-11 DIAGNOSIS — Z95.5 HISTORY OF CORONARY ARTERY STENT PLACEMENT: ICD-10-CM

## 2021-03-11 DIAGNOSIS — M19.049 CMC ARTHRITIS: ICD-10-CM

## 2021-03-11 DIAGNOSIS — K76.0 STEATOSIS OF LIVER: ICD-10-CM

## 2021-03-11 DIAGNOSIS — E78.00 ELEVATED CHOLESTEROL: ICD-10-CM

## 2021-03-11 RX ORDER — CLINDAMYCIN PHOSPHATE 10 MG/ML
LOTION TOPICAL
COMMUNITY
Start: 2021-01-21 | End: 2022-01-04 | Stop reason: ALTCHOICE

## 2021-03-11 NOTE — PROGRESS NOTES
3/11/2021  Spoke to Bryan and his wife for CCM. Updates to patient care team/comments: yes, added   [facility], [address] [place] [address] Francesco   Patient reported changes in medications: together we reviewed with no changes.     Med Adherence  Commen Ge Cavanaugh will not be getting the vaccine for a while due to her having severe allergies. Diet: fairly good. At times he still skips meals but is trying to incorporate healthier habits.       Self Management Goals/Action Plan:    · Active goal from pre

## 2021-03-13 ENCOUNTER — TELEPHONE (OUTPATIENT)
Dept: CARDIOLOGY | Age: 71
End: 2021-03-13

## 2021-03-18 ENCOUNTER — TELEPHONE (OUTPATIENT)
Dept: CARDIOLOGY | Age: 71
End: 2021-03-18

## 2021-03-18 RX ORDER — CLOPIDOGREL BISULFATE 75 MG/1
75 TABLET ORAL DAILY
Qty: 90 TABLET | Refills: 2 | Status: SHIPPED | OUTPATIENT
Start: 2021-03-18 | End: 2021-12-22

## 2021-03-22 RX ORDER — CLOPIDOGREL BISULFATE 75 MG/1
75 TABLET ORAL DAILY
OUTPATIENT
Start: 2021-03-22

## 2021-03-23 ENCOUNTER — APPOINTMENT (OUTPATIENT)
Dept: MRI IMAGING | Age: 71
End: 2021-03-23
Attending: PSYCHIATRY & NEUROLOGY

## 2021-03-30 ENCOUNTER — HOSPITAL ENCOUNTER (OUTPATIENT)
Dept: MRI IMAGING | Age: 71
Discharge: HOME | End: 2021-03-30
Attending: PSYCHIATRY & NEUROLOGY

## 2021-03-30 DIAGNOSIS — R41.3 MEMORY LOSS: ICD-10-CM

## 2021-03-30 DIAGNOSIS — I63.81 LACUNAR INFARCTION (CMD): ICD-10-CM

## 2021-03-30 PROCEDURE — 70551 MRI BRAIN STEM W/O DYE: CPT

## 2021-03-31 PROCEDURE — 99490 CHRNC CARE MGMT STAFF 1ST 20: CPT

## 2021-04-08 ENCOUNTER — APPOINTMENT (OUTPATIENT)
Dept: CARDIOLOGY | Age: 71
End: 2021-04-08
Attending: INTERNAL MEDICINE

## 2021-04-08 ENCOUNTER — TELEPHONE (OUTPATIENT)
Dept: NEUROLOGY | Age: 71
End: 2021-04-08

## 2021-04-14 ENCOUNTER — PATIENT OUTREACH (OUTPATIENT)
Dept: CASE MANAGEMENT | Age: 71
End: 2021-04-14

## 2021-04-14 NOTE — PROGRESS NOTES
Spoke to pt briefly states he would love to follow up but he is on his way out the door. Picking wife up from Dialysis. Pt would like a call Friday.     Total time spent with patient including chart review: 5  Time spent with patient this month: 5    Tota

## 2021-04-15 ENCOUNTER — APPOINTMENT (OUTPATIENT)
Dept: CARDIOLOGY | Age: 71
End: 2021-04-15
Attending: INTERNAL MEDICINE

## 2021-04-15 ENCOUNTER — PATIENT OUTREACH (OUTPATIENT)
Dept: CASE MANAGEMENT | Age: 71
End: 2021-04-15

## 2021-04-15 NOTE — PROGRESS NOTES
Attempted to contact pt as we dicussed yesterday, no answer left detailed message for pt to call back.    Total time spent with patient including chart review: 2  Time spent with patient this month: 7    Total time spent with communication and chart review

## 2021-05-04 ENCOUNTER — TELEPHONE (OUTPATIENT)
Dept: CARDIOLOGY | Age: 71
End: 2021-05-04

## 2021-05-13 ENCOUNTER — TELEPHONE (OUTPATIENT)
Dept: CARDIOLOGY | Age: 71
End: 2021-05-13

## 2021-05-13 ENCOUNTER — APPOINTMENT (OUTPATIENT)
Dept: CARDIOLOGY | Age: 71
End: 2021-05-13
Attending: INTERNAL MEDICINE

## 2021-05-17 RX ORDER — PRAVASTATIN SODIUM 80 MG/1
TABLET ORAL
Qty: 90 TABLET | Refills: 0 | Status: SHIPPED | OUTPATIENT
Start: 2021-05-17 | End: 2021-08-19

## 2021-05-18 ENCOUNTER — PATIENT OUTREACH (OUTPATIENT)
Dept: CASE MANAGEMENT | Age: 71
End: 2021-05-18

## 2021-05-18 DIAGNOSIS — M06.042 RHEUMATOID ARTHRITIS INVOLVING BOTH HANDS WITH NEGATIVE RHEUMATOID FACTOR (HCC): ICD-10-CM

## 2021-05-18 DIAGNOSIS — Z95.1 HX OF CABG: ICD-10-CM

## 2021-05-18 DIAGNOSIS — G47.33 OBSTRUCTIVE SLEEP APNEA: ICD-10-CM

## 2021-05-18 DIAGNOSIS — F01.50 VASCULAR DEMENTIA WITHOUT BEHAVIORAL DISTURBANCE (HCC): ICD-10-CM

## 2021-05-18 DIAGNOSIS — E78.00 ELEVATED CHOLESTEROL: ICD-10-CM

## 2021-05-18 DIAGNOSIS — M19.049 CMC ARTHRITIS: ICD-10-CM

## 2021-05-18 DIAGNOSIS — M06.041 RHEUMATOID ARTHRITIS INVOLVING BOTH HANDS WITH NEGATIVE RHEUMATOID FACTOR (HCC): ICD-10-CM

## 2021-05-18 DIAGNOSIS — M65.4 DE QUERVAIN'S DISEASE (TENOSYNOVITIS): ICD-10-CM

## 2021-05-18 DIAGNOSIS — I10 ESSENTIAL HYPERTENSION: ICD-10-CM

## 2021-05-18 DIAGNOSIS — I25.118 CORONARY ARTERY DISEASE OF NATIVE ARTERY OF NATIVE HEART WITH STABLE ANGINA PECTORIS (HCC): ICD-10-CM

## 2021-05-18 DIAGNOSIS — Z95.5 HISTORY OF CORONARY ARTERY STENT PLACEMENT: ICD-10-CM

## 2021-05-18 RX ORDER — ALPRAZOLAM 0.25 MG/1
0.25 TABLET ORAL
COMMUNITY
End: 2021-12-02

## 2021-05-18 NOTE — PROGRESS NOTES
5/18/2021  Spoke to Bryan for CCM. Updates to patient care team/comments: UTD. Patient reported changes in medications: together we reviewed with no updates. Med Adherence  Comment: pt reports taking all medications as prescribed.        Health different from the original.  · Stroke risk factors include heart disease, hyperlipidemia and heart disease. · He is on a full aspirin daily. · Blood pressure, lipids and blood glucose are well controlled.   · MRI to be rechecked     Blood pressures contr

## 2021-05-19 ENCOUNTER — TELEPHONE (OUTPATIENT)
Dept: CARDIOLOGY | Age: 71
End: 2021-05-19

## 2021-05-19 RX ORDER — PRAVASTATIN SODIUM 80 MG/1
TABLET ORAL
Qty: 90 TABLET | Refills: 0 | OUTPATIENT
Start: 2021-05-19

## 2021-05-20 ENCOUNTER — TELEPHONIC VISIT (OUTPATIENT)
Dept: CARDIOLOGY | Age: 71
End: 2021-05-20
Attending: INTERNAL MEDICINE

## 2021-05-20 ENCOUNTER — APPOINTMENT (OUTPATIENT)
Dept: CARDIOLOGY | Age: 71
End: 2021-05-20

## 2021-05-20 DIAGNOSIS — I10 BENIGN ESSENTIAL HYPERTENSION: Primary | ICD-10-CM

## 2021-05-20 DIAGNOSIS — I50.42 CHRONIC COMBINED SYSTOLIC AND DIASTOLIC HEART FAILURE (CMD): ICD-10-CM

## 2021-05-20 DIAGNOSIS — Z95.1 HX OF CABG: ICD-10-CM

## 2021-05-20 DIAGNOSIS — Z95.5 S/P PRIMARY ANGIOPLASTY WITH CORONARY STENT: ICD-10-CM

## 2021-05-20 DIAGNOSIS — I21.4 NSTEMI (NON-ST ELEVATED MYOCARDIAL INFARCTION) (CMD): ICD-10-CM

## 2021-05-20 DIAGNOSIS — E78.2 MIXED HYPERLIPIDEMIA: ICD-10-CM

## 2021-05-20 DIAGNOSIS — I20.89 CHRONIC STABLE ANGINA: ICD-10-CM

## 2021-05-20 DIAGNOSIS — I25.118 ATHEROSCLEROSIS OF NATIVE CORONARY ARTERY OF NATIVE HEART WITH STABLE ANGINA PECTORIS (CMD): ICD-10-CM

## 2021-05-20 PROCEDURE — 99214 OFFICE O/P EST MOD 30 MIN: CPT | Performed by: INTERNAL MEDICINE

## 2021-05-20 ASSESSMENT — ENCOUNTER SYMPTOMS
SUSPICIOUS LESIONS: 0
HEMATOCHEZIA: 0
ALLERGIC/IMMUNOLOGIC COMMENTS: NO NEW FOOD ALLERGIES
BRUISES/BLEEDS EASILY: 0
HEMOPTYSIS: 0
CHILLS: 0
WEIGHT LOSS: 0
COUGH: 0
WEIGHT GAIN: 0
FEVER: 0

## 2021-05-26 ENCOUNTER — TELEPHONE (OUTPATIENT)
Dept: INTERNAL MEDICINE CLINIC | Facility: CLINIC | Age: 71
End: 2021-05-26

## 2021-05-26 NOTE — TELEPHONE ENCOUNTER
Patients wife calling today and indicates patient started having episodes of diarrhea around 3 PM yesterday \"too many to count\". She states patient has no sensation of BM, and has taken 4 showers since this AM. Patient was unable to sleep through the night. Patient has no report of new medications, n/v/fevers, confusion, behavior changes. Hasn't ate since yesterday. No recent abx. Wife states she cannot get him in the car or he'll soil himself. Wife and patient advised patient needs to be seen and evaluated. If unable to transport patient would need to call an ambulance for patient to be seen. Wife verbalized understanding.

## 2021-05-26 NOTE — TELEPHONE ENCOUNTER
Pt's wife Carlso Arana, on hipaa - stated pt has had diarrhea since 3pm yesterday and isn't able to get in the car to get medical care. Carlos Arana stated pt has a history of Cdiff. Carlos Arana stated he's taking 6 imodium from 3pm to now. High Te.

## 2021-05-31 PROCEDURE — 99490 CHRNC CARE MGMT STAFF 1ST 20: CPT

## 2021-06-17 ENCOUNTER — PATIENT OUTREACH (OUTPATIENT)
Dept: CASE MANAGEMENT | Age: 71
End: 2021-06-17

## 2021-06-17 DIAGNOSIS — Z95.5 HISTORY OF CORONARY ARTERY STENT PLACEMENT: ICD-10-CM

## 2021-06-17 DIAGNOSIS — I25.118 CORONARY ARTERY DISEASE OF NATIVE ARTERY OF NATIVE HEART WITH STABLE ANGINA PECTORIS (HCC): ICD-10-CM

## 2021-06-17 DIAGNOSIS — M06.042 RHEUMATOID ARTHRITIS INVOLVING BOTH HANDS WITH NEGATIVE RHEUMATOID FACTOR (HCC): ICD-10-CM

## 2021-06-17 DIAGNOSIS — I10 ESSENTIAL HYPERTENSION: ICD-10-CM

## 2021-06-17 DIAGNOSIS — F01.50 VASCULAR DEMENTIA WITHOUT BEHAVIORAL DISTURBANCE (HCC): ICD-10-CM

## 2021-06-17 DIAGNOSIS — Z95.1 HX OF CABG: ICD-10-CM

## 2021-06-17 DIAGNOSIS — M19.049 CMC ARTHRITIS: ICD-10-CM

## 2021-06-17 DIAGNOSIS — E78.00 ELEVATED CHOLESTEROL: ICD-10-CM

## 2021-06-17 DIAGNOSIS — G47.33 OBSTRUCTIVE SLEEP APNEA: ICD-10-CM

## 2021-06-17 DIAGNOSIS — M06.041 RHEUMATOID ARTHRITIS INVOLVING BOTH HANDS WITH NEGATIVE RHEUMATOID FACTOR (HCC): ICD-10-CM

## 2021-06-17 RX ORDER — KETOCONAZOLE 20 MG/G
CREAM TOPICAL
COMMUNITY
Start: 2021-04-29 | End: 2022-01-04 | Stop reason: ALTCHOICE

## 2021-06-17 NOTE — PROGRESS NOTES
6/17/2021  Spoke to Juan's wife for CCM. Updates to patient care team/comments: yes, added   Kim Gallegos, [address] [place] [address] Physician Assistant   Urology    Patient reported changes in medications: together we reviewed with no updates.   Pt's admission], improving  - Present on admission, likely due to poor PO intake and GI loss.    - Continue IV Fluids  - Daily BMP  - Avoid nephrotoxins      Orthostatic Hypotension  Dehydration  - Etiology: diarrhea, poor oral intake  - Alpha blocker [Home Med] goals and action plan recreation/update. Provided acknowledgment and validation to patient's concerns.    Monthly Minute Total including today: 20    Physical assessment, complete health history, and need for CCM established by Leidy Chavez MD.

## 2021-06-23 RX ORDER — LANSOPRAZOLE 30 MG/1
CAPSULE, DELAYED RELEASE ORAL
Qty: 90 CAPSULE | Refills: 0 | Status: SHIPPED | OUTPATIENT
Start: 2021-06-23 | End: 2021-06-25

## 2021-06-23 NOTE — TELEPHONE ENCOUNTER
Last OV 11.12.20 w/ CB (annual pe)   Last PE 11.12.20  Last REFILL 3.4.21 Lansoprazole 30mg #90 0R  Last LABS 9.29.20 PSA, TSH w/reflex, HgA1c, CMP, CBC    Future Appointments   Date Time Provider Edison Dey   7/6/2021  2:30 PM Jennifer[place]elham, [place]

## 2021-06-25 ENCOUNTER — TELEPHONE (OUTPATIENT)
Dept: INTERNAL MEDICINE CLINIC | Facility: CLINIC | Age: 71
End: 2021-06-25

## 2021-06-25 RX ORDER — PANTOPRAZOLE SODIUM 40 MG/1
40 TABLET, DELAYED RELEASE ORAL
Qty: 90 TABLET | Refills: 0 | Status: SHIPPED | OUTPATIENT
Start: 2021-06-25 | End: 2021-09-27

## 2021-06-25 NOTE — TELEPHONE ENCOUNTER
Pt would like a call back to discuss why he needs to take   LANSOPRAZOLE 30 MG Oral Capsule Delayed Release 90 capsule 0 6/23/2021    Sig:   TAKE 1 CAPSULE DAILY     Route:   (none)     Order #:   [id number]       Please advise

## 2021-06-25 NOTE — TELEPHONE ENCOUNTER
Pt reports the pharm has already called and protonix is only $11. Pt states this is much better and will fill.

## 2021-06-25 NOTE — TELEPHONE ENCOUNTER
Patient states the cost of lansoprazole has increased asking if there are alternatives for this patient take for heartburn that is recommended.   Hx of erosive esophagitis

## 2021-06-30 PROCEDURE — 99490 CHRNC CARE MGMT STAFF 1ST 20: CPT

## 2021-07-08 ENCOUNTER — OFFICE VISIT (OUTPATIENT)
Dept: NEUROLOGY | Age: 71
End: 2021-07-08

## 2021-07-08 VITALS
BODY MASS INDEX: 34.51 KG/M2 | DIASTOLIC BLOOD PRESSURE: 78 MMHG | WEIGHT: 202.16 LBS | HEART RATE: 64 BPM | HEIGHT: 64 IN | SYSTOLIC BLOOD PRESSURE: 137 MMHG | RESPIRATION RATE: 16 BRPM

## 2021-07-08 DIAGNOSIS — G31.84 MCI (MILD COGNITIVE IMPAIRMENT): Primary | ICD-10-CM

## 2021-07-08 DIAGNOSIS — G47.19 EXCESSIVE DAYTIME SLEEPINESS: ICD-10-CM

## 2021-07-08 DIAGNOSIS — G30.9 ALZHEIMERS DISEASE (CMD): ICD-10-CM

## 2021-07-08 DIAGNOSIS — R10.12 LEFT UPPER QUADRANT ABDOMINAL PAIN: ICD-10-CM

## 2021-07-08 DIAGNOSIS — F02.80 ALZHEIMERS DISEASE (CMD): ICD-10-CM

## 2021-07-08 PROBLEM — C44.90 SKIN CANCER: Status: ACTIVE | Noted: 2021-07-08

## 2021-07-08 PROCEDURE — 99215 OFFICE O/P EST HI 40 MIN: CPT | Performed by: PSYCHIATRY & NEUROLOGY

## 2021-07-08 RX ORDER — PANTOPRAZOLE SODIUM 40 MG/1
40 TABLET, DELAYED RELEASE ORAL DAILY
COMMUNITY
Start: 2021-06-25 | End: 2023-04-25

## 2021-07-08 RX ORDER — TAMSULOSIN HYDROCHLORIDE 0.4 MG/1
CAPSULE ORAL
COMMUNITY
Start: 2021-07-06 | End: 2021-11-09 | Stop reason: CLARIF

## 2021-07-08 RX ORDER — KETOCONAZOLE 20 MG/G
CREAM TOPICAL
COMMUNITY
Start: 2021-06-22 | End: 2023-03-30 | Stop reason: ALTCHOICE

## 2021-07-08 RX ORDER — CLINDAMYCIN PHOSPHATE 10 UG/ML
LOTION TOPICAL
COMMUNITY
Start: 2021-06-08 | End: 2021-07-08

## 2021-07-08 ASSESSMENT — ENCOUNTER SYMPTOMS
CONSTITUTIONAL NEGATIVE: 1
HEMATOLOGIC/LYMPHATIC NEGATIVE: 1
EYES NEGATIVE: 1
ENDOCRINE NEGATIVE: 1
ABDOMINAL PAIN: 1
NEUROLOGICAL NEGATIVE: 1
PSYCHIATRIC NEGATIVE: 1
RESPIRATORY NEGATIVE: 1

## 2021-07-10 PROBLEM — Z95.1 HX OF CABG: Chronic | Status: ACTIVE | Noted: 2020-04-09

## 2021-07-10 PROBLEM — I25.10 CORONARY ATHEROSCLEROSIS OF NATIVE CORONARY ARTERY: Chronic | Status: ACTIVE | Noted: 2020-04-09

## 2021-07-10 PROBLEM — M06.041 RHEUMATOID ARTHRITIS INVOLVING BOTH HANDS WITH NEGATIVE RHEUMATOID FACTOR (CMD): Chronic | Status: ACTIVE | Noted: 2018-11-01

## 2021-07-10 PROBLEM — I21.4 NSTEMI (NON-ST ELEVATED MYOCARDIAL INFARCTION) (CMD): Chronic | Status: ACTIVE | Noted: 2019-06-04

## 2021-07-10 PROBLEM — Z98.890 S/P COLONOSCOPY: Chronic | Status: ACTIVE | Noted: 2018-05-31

## 2021-07-10 PROBLEM — K21.9 GERD (GASTROESOPHAGEAL REFLUX DISEASE): Chronic | Status: ACTIVE | Noted: 2019-06-04

## 2021-07-10 PROBLEM — F01.50 VASCULAR DEMENTIA WITHOUT BEHAVIORAL DISTURBANCE (CMD): Status: RESOLVED | Noted: 2020-09-11 | Resolved: 2021-07-10

## 2021-07-10 PROBLEM — M06.042 RHEUMATOID ARTHRITIS INVOLVING BOTH HANDS WITH NEGATIVE RHEUMATOID FACTOR (CMD): Chronic | Status: ACTIVE | Noted: 2018-11-01

## 2021-07-10 PROBLEM — Z86.73 HISTORY OF TRANSIENT ISCHEMIC ATTACK (TIA): Chronic | Status: ACTIVE | Noted: 2019-06-04

## 2021-07-10 PROBLEM — R10.12 LEFT UPPER QUADRANT ABDOMINAL PAIN: Status: ACTIVE | Noted: 2021-07-10

## 2021-07-10 PROBLEM — E78.2 MIXED HYPERLIPIDEMIA: Chronic | Status: ACTIVE | Noted: 2020-04-09

## 2021-07-10 PROBLEM — I45.2 RIGHT BBB/LEFT ANT FASC BLOCK: Chronic | Status: ACTIVE | Noted: 2019-06-04

## 2021-07-10 PROBLEM — I10 BENIGN ESSENTIAL HYPERTENSION: Chronic | Status: ACTIVE | Noted: 2020-04-09

## 2021-07-12 ENCOUNTER — TELEPHONE (OUTPATIENT)
Dept: INTERNAL MEDICINE CLINIC | Facility: CLINIC | Age: 71
End: 2021-07-12

## 2021-07-12 NOTE — TELEPHONE ENCOUNTER
Patient reports 6/10-9/10 pain left sided generalized abdominal pain x 2 days. patient denies SOB, chest pain,urinary changes, fevers, n/v/d/c. Patient states with movement pain increases to 9/10 with tylenol. Patient was seen in [facility] and left AMA as he indicates he was sitting in the room for 6 hours with no visit by a provider. Patient refusing to return to [facility]. patient advised given the pain he has and left sided abdominal pain would need to be seen in ER. Patient will return to [place] tomorrow with wife and will be seen in ER at [place] which is the only care patient is agreeable to at this time. Patient will call with any changes, but he refused to be seen in ER again at [facility]. Patient understands importance of being seen right away for rule out of cause of 9/10 pain, he states he will be seen but only at [place] tomorrow when he returns. LOV with 11/12/2020 with UGO.

## 2021-07-12 NOTE — TELEPHONE ENCOUNTER
Pt stated he wants a nurse to call him. I tried to ask him if I can help him. He said he wanted to talk to [facility] or her nurse. Please advise.

## 2021-07-12 NOTE — TELEPHONE ENCOUNTER
Patient understood recommendations given for ER evaluation. Patient states will only be seen in ER at [facility] when he returns to [place]  7/13/2021.

## 2021-07-12 NOTE — TELEPHONE ENCOUNTER
Agree with emergent eval and recs for emergent eval given level of pain. Agree that pt should really be evaluated today.

## 2021-07-13 ENCOUNTER — APPOINTMENT (OUTPATIENT)
Dept: CT IMAGING | Facility: HOSPITAL | Age: 71
End: 2021-07-13
Attending: EMERGENCY MEDICINE
Payer: MEDICARE

## 2021-07-13 ENCOUNTER — HOSPITAL ENCOUNTER (EMERGENCY)
Facility: HOSPITAL | Age: 71
Discharge: HOME | End: 2021-07-13
Attending: EMERGENCY MEDICINE
Payer: MEDICARE

## 2021-07-13 VITALS
SYSTOLIC BLOOD PRESSURE: 148 MMHG | DIASTOLIC BLOOD PRESSURE: 80 MMHG | HEART RATE: 66 BPM | BODY MASS INDEX: 31.82 KG/M2 | OXYGEN SATURATION: 96 % | TEMPERATURE: 98 F | RESPIRATION RATE: 14 BRPM | HEIGHT: 66 IN | WEIGHT: 198 LBS

## 2021-07-13 DIAGNOSIS — R10.9 ABDOMINAL PAIN OF UNKNOWN ETIOLOGY: Primary | ICD-10-CM

## 2021-07-13 LAB
ALBUMIN SERPL-MCNC: 3.7 G/DL (ref 3.4–5)
ALBUMIN/GLOB SERPL: 1 {RATIO} (ref 1–2)
ALP LIVER SERPL-CCNC: 88 U/L
ALT SERPL-CCNC: 18 U/L
ANION GAP SERPL CALC-SCNC: 2 MMOL/L (ref 0–18)
AST SERPL-CCNC: 13 U/L (ref 15–37)
BASOPHILS # BLD AUTO: 0.08 X10(3) UL (ref 0–0.2)
BASOPHILS NFR BLD AUTO: 0.8 %
BILIRUB SERPL-MCNC: 0.8 MG/DL (ref 0.1–2)
BILIRUB UR QL STRIP.AUTO: NEGATIVE
BUN BLD-MCNC: 10 MG/DL (ref 7–18)
BUN/CREAT SERPL: 9.8 (ref 10–20)
CALCIUM BLD-MCNC: 9 MG/DL (ref 8.5–10.1)
CHLORIDE SERPL-SCNC: 110 MMOL/L (ref 98–112)
CLARITY UR REFRACT.AUTO: CLEAR
CO2 SERPL-SCNC: 28 MMOL/L (ref 21–32)
COLOR UR AUTO: YELLOW
CREAT BLD-MCNC: 1.02 MG/DL
DEPRECATED RDW RBC AUTO: 46.6 FL (ref 35.1–46.3)
EOSINOPHIL # BLD AUTO: 0.12 X10(3) UL (ref 0–0.7)
EOSINOPHIL NFR BLD AUTO: 1.2 %
ERYTHROCYTE [DISTWIDTH] IN BLOOD BY AUTOMATED COUNT: 14.1 % (ref 11–15)
GLOBULIN PLAS-MCNC: 3.6 G/DL (ref 2.8–4.4)
GLUCOSE BLD-MCNC: 91 MG/DL (ref 70–99)
GLUCOSE UR STRIP.AUTO-MCNC: NEGATIVE MG/DL
HCT VFR BLD AUTO: 39.6 %
HGB BLD-MCNC: 13.1 G/DL
IMM GRANULOCYTES # BLD AUTO: 0.04 X10(3) UL (ref 0–1)
IMM GRANULOCYTES NFR BLD: 0.4 %
KETONES UR STRIP.AUTO-MCNC: NEGATIVE MG/DL
LEUKOCYTE ESTERASE UR QL STRIP.AUTO: NEGATIVE
LIPASE SERPL-CCNC: 75 U/L (ref 73–393)
LYMPHOCYTES # BLD AUTO: 1.55 X10(3) UL (ref 1–4)
LYMPHOCYTES NFR BLD AUTO: 15.8 %
M PROTEIN MFR SERPL ELPH: 7.3 G/DL (ref 6.4–8.2)
MCH RBC QN AUTO: 30 PG (ref 26–34)
MCHC RBC AUTO-ENTMCNC: 33.1 G/DL (ref 31–37)
MCV RBC AUTO: 90.6 FL
MONOCYTES # BLD AUTO: 0.98 X10(3) UL (ref 0.1–1)
MONOCYTES NFR BLD AUTO: 10 %
NEUTROPHILS # BLD AUTO: 7.01 X10 (3) UL (ref 1.5–7.7)
NEUTROPHILS # BLD AUTO: 7.01 X10(3) UL (ref 1.5–7.7)
NEUTROPHILS NFR BLD AUTO: 71.8 %
NITRITE UR QL STRIP.AUTO: NEGATIVE
OSMOLALITY SERPL CALC.SUM OF ELEC: 289 MOSM/KG (ref 275–295)
PH UR STRIP.AUTO: 5 [PH] (ref 5–8)
PLATELET # BLD AUTO: 176 10(3)UL (ref 150–450)
POTASSIUM SERPL-SCNC: 3.9 MMOL/L (ref 3.5–5.1)
PROT UR STRIP.AUTO-MCNC: NEGATIVE MG/DL
RBC # BLD AUTO: 4.37 X10(6)UL
SODIUM SERPL-SCNC: 140 MMOL/L (ref 136–145)
SP GR UR STRIP.AUTO: 1.02 (ref 1–1.03)
UROBILINOGEN UR STRIP.AUTO-MCNC: <2 MG/DL
WBC # BLD AUTO: 9.8 X10(3) UL (ref 4–11)

## 2021-07-13 PROCEDURE — 83690 ASSAY OF LIPASE: CPT | Performed by: EMERGENCY MEDICINE

## 2021-07-13 PROCEDURE — 74177 CT ABD & PELVIS W/CONTRAST: CPT | Performed by: EMERGENCY MEDICINE

## 2021-07-13 PROCEDURE — 96374 THER/PROPH/DIAG INJ IV PUSH: CPT

## 2021-07-13 PROCEDURE — 80053 COMPREHEN METABOLIC PANEL: CPT | Performed by: EMERGENCY MEDICINE

## 2021-07-13 PROCEDURE — 99284 EMERGENCY DEPT VISIT MOD MDM: CPT

## 2021-07-13 PROCEDURE — 81001 URINALYSIS AUTO W/SCOPE: CPT | Performed by: EMERGENCY MEDICINE

## 2021-07-13 PROCEDURE — 85025 COMPLETE CBC W/AUTO DIFF WBC: CPT | Performed by: EMERGENCY MEDICINE

## 2021-07-13 PROCEDURE — 96361 HYDRATE IV INFUSION ADD-ON: CPT

## 2021-07-13 PROCEDURE — 96375 TX/PRO/DX INJ NEW DRUG ADDON: CPT

## 2021-07-13 RX ORDER — ONDANSETRON 2 MG/ML
4 INJECTION INTRAMUSCULAR; INTRAVENOUS ONCE
Status: COMPLETED | OUTPATIENT
Start: 2021-07-13 | End: 2021-07-13

## 2021-07-13 RX ORDER — SODIUM CHLORIDE 9 MG/ML
INJECTION, SOLUTION INTRAVENOUS CONTINUOUS
Status: DISCONTINUED | OUTPATIENT
Start: 2021-07-13 | End: 2021-07-13

## 2021-07-13 RX ORDER — DICYCLOMINE HCL 20 MG
20 TABLET ORAL 4 TIMES DAILY PRN
Qty: 30 TABLET | Refills: 0 | Status: SHIPPED | OUTPATIENT
Start: 2021-07-13 | End: 2021-08-12

## 2021-07-13 RX ORDER — KETOROLAC TROMETHAMINE 30 MG/ML
15 INJECTION, SOLUTION INTRAMUSCULAR; INTRAVENOUS ONCE
Status: DISCONTINUED | OUTPATIENT
Start: 2021-07-13 | End: 2021-07-13

## 2021-07-13 RX ORDER — HYDROMORPHONE HYDROCHLORIDE 1 MG/ML
0.5 INJECTION, SOLUTION INTRAMUSCULAR; INTRAVENOUS; SUBCUTANEOUS EVERY 30 MIN PRN
Status: DISCONTINUED | OUTPATIENT
Start: 2021-07-13 | End: 2021-07-13

## 2021-07-13 NOTE — ED INITIAL ASSESSMENT (HPI)
Pt c/o left lower quadrant pain x1 week with associated nausea - last bm today; denies history of same symptoms in past

## 2021-07-13 NOTE — ED PROVIDER NOTES
Patient Seen in: [facility] Emergency Department      History   Patient presents with:  Abdomen/Flank Pain    Stated Complaint: left sided abdominal pain x1 week    HPI/Subjective:   HPI    Patient is a 69-year-old male who states for the past 5 day ANESTHETIC/STEROID, TRANSFORAMINAL EPIDURAL; LUMBAR/SACRAL, SINGLE LEVEL  7/23/2012    Procedure: TRANSFORAMINAL EPIDURAL - LUMBAR;  Surgeon: Maye Alcocer MD;  Location: [address] [place] [address]   • INJECTION, ANESTHETIC/STEROID, TRANSFORAMINAL EP Never used    Alcohol use: Not Currently      Alcohol/week: 1.0 standard drinks      Types: 1 Standard drinks or equivalent per week      Comment: One glass of wine at [place]     Drug use: Never             Review of Systems    Positive for stated compl following components:    RDW-SD 46.6 (*)     All other components within normal limits   LIPASE - Normal   CBC WITH DIFFERENTIAL WITH PLATELET    Narrative: The following orders were created for panel order CBC With Differential With Platelet.   Procedu R-0

## 2021-07-19 ENCOUNTER — PATIENT OUTREACH (OUTPATIENT)
Dept: CASE MANAGEMENT | Age: 71
End: 2021-07-19

## 2021-07-19 DIAGNOSIS — I10 ESSENTIAL HYPERTENSION: ICD-10-CM

## 2021-07-19 DIAGNOSIS — K76.0 STEATOSIS OF LIVER: ICD-10-CM

## 2021-07-19 DIAGNOSIS — Z95.5 HISTORY OF CORONARY ARTERY STENT PLACEMENT: ICD-10-CM

## 2021-07-19 DIAGNOSIS — R97.20 ELEVATED PSA: ICD-10-CM

## 2021-07-19 DIAGNOSIS — F01.50 VASCULAR DEMENTIA WITHOUT BEHAVIORAL DISTURBANCE (HCC): ICD-10-CM

## 2021-07-19 DIAGNOSIS — Z95.1 HX OF CABG: ICD-10-CM

## 2021-07-19 DIAGNOSIS — G47.33 OBSTRUCTIVE SLEEP APNEA: ICD-10-CM

## 2021-07-19 DIAGNOSIS — M19.049 CMC ARTHRITIS: ICD-10-CM

## 2021-07-19 DIAGNOSIS — M06.042 RHEUMATOID ARTHRITIS INVOLVING BOTH HANDS WITH NEGATIVE RHEUMATOID FACTOR (HCC): ICD-10-CM

## 2021-07-19 DIAGNOSIS — M65.4 DE QUERVAIN'S DISEASE (TENOSYNOVITIS): ICD-10-CM

## 2021-07-19 DIAGNOSIS — E78.00 ELEVATED CHOLESTEROL: ICD-10-CM

## 2021-07-19 DIAGNOSIS — I25.118 CORONARY ARTERY DISEASE OF NATIVE ARTERY OF NATIVE HEART WITH STABLE ANGINA PECTORIS (HCC): ICD-10-CM

## 2021-07-19 DIAGNOSIS — M06.041 RHEUMATOID ARTHRITIS INVOLVING BOTH HANDS WITH NEGATIVE RHEUMATOID FACTOR (HCC): ICD-10-CM

## 2021-07-19 RX ORDER — HYDROXYCHLOROQUINE SULFATE 200 MG/1
200 TABLET, FILM COATED ORAL DAILY
COMMUNITY
Start: 2019-06-11 | End: 2021-09-03 | Stop reason: ALTCHOICE

## 2021-07-19 RX ORDER — ACETAMINOPHEN AND CODEINE PHOSPHATE 300; 30 MG/1; MG/1
TABLET ORAL
COMMUNITY
End: 2021-12-02 | Stop reason: ALTCHOICE

## 2021-07-19 NOTE — PROGRESS NOTES
7/19/2021  Spoke to Bryan for CCM. Updates to patient care team/comments: UTD. Patient reported changes in medications: together we reviewed   Med Adherence  Comment: pt reports taking all medications as prescribed. Health Maintenance:    Rasheed Grossman selective alpha blocker, Tamsulosin  PSA in fall. Follow up then with Dr. South Garibay    Reminded pt to follow up with Dr. South Garibay. Pt state he will discuss free days with his spouse before scheduling follow up with Dr. Acacia Lopez.       Self Management Goals/Action

## 2021-07-31 PROCEDURE — 99490 CHRNC CARE MGMT STAFF 1ST 20: CPT

## 2021-08-03 ENCOUNTER — TELEPHONE (OUTPATIENT)
Dept: CARDIOLOGY | Age: 71
End: 2021-08-03

## 2021-08-06 ENCOUNTER — TELEPHONE (OUTPATIENT)
Dept: CARDIOLOGY | Age: 71
End: 2021-08-06

## 2021-08-06 RX ORDER — CLONAZEPAM 1 MG/1
TABLET ORAL
Qty: 90 TABLET | Refills: 0 | OUTPATIENT
Start: 2021-08-06

## 2021-08-06 RX ORDER — CLONAZEPAM 1 MG/1
1 TABLET ORAL AT BEDTIME
Qty: 90 TABLET | Refills: 1 | Status: SHIPPED | OUTPATIENT
Start: 2021-08-06 | End: 2021-08-06 | Stop reason: ALTCHOICE

## 2021-08-06 RX ORDER — CLONAZEPAM 1 MG/1
1 TABLET ORAL AT BEDTIME
Qty: 90 TABLET | Refills: 2 | OUTPATIENT
Start: 2021-08-06

## 2021-08-19 RX ORDER — PRAVASTATIN SODIUM 80 MG/1
TABLET ORAL
Qty: 90 TABLET | Refills: 1 | Status: SHIPPED | OUTPATIENT
Start: 2021-08-19 | End: 2022-01-21 | Stop reason: CLARIF

## 2021-08-23 ENCOUNTER — PATIENT OUTREACH (OUTPATIENT)
Dept: CASE MANAGEMENT | Age: 71
End: 2021-08-23

## 2021-08-23 NOTE — PROGRESS NOTES
Attempted to contact pt no answer left detailed message for pt to call back.    Total time spent with patient including chart review: 2  Time spent with patient this month: 2    Total time spent with communication and chart review this month to date: 2 Patient of Dr. Vega, called and had many questions regarding last visit.  Scheduled a video visit with provider so patient can have time to discuss information.  Also, patient recalls provider stating she should have vein ablation with Dr. Wright.  Message sent to his team to set up appointment with patient.

## 2021-08-31 RX ORDER — DONEPEZIL HYDROCHLORIDE 10 MG/1
10 TABLET, FILM COATED ORAL DAILY
Qty: 90 TABLET | Refills: 1 | Status: SHIPPED | OUTPATIENT
Start: 2021-08-31 | End: 2022-03-28

## 2021-09-01 ENCOUNTER — PATIENT OUTREACH (OUTPATIENT)
Dept: CASE MANAGEMENT | Age: 71
End: 2021-09-01

## 2021-09-03 ENCOUNTER — PATIENT OUTREACH (OUTPATIENT)
Dept: CASE MANAGEMENT | Age: 71
End: 2021-09-03

## 2021-09-03 DIAGNOSIS — I25.118 CORONARY ARTERY DISEASE OF NATIVE ARTERY OF NATIVE HEART WITH STABLE ANGINA PECTORIS (HCC): ICD-10-CM

## 2021-09-03 DIAGNOSIS — Z95.5 HISTORY OF CORONARY ARTERY STENT PLACEMENT: ICD-10-CM

## 2021-09-03 DIAGNOSIS — K76.0 STEATOSIS OF LIVER: ICD-10-CM

## 2021-09-03 DIAGNOSIS — E78.00 ELEVATED CHOLESTEROL: ICD-10-CM

## 2021-09-03 DIAGNOSIS — F01.50 VASCULAR DEMENTIA WITHOUT BEHAVIORAL DISTURBANCE (HCC): ICD-10-CM

## 2021-09-03 DIAGNOSIS — I10 ESSENTIAL HYPERTENSION: ICD-10-CM

## 2021-09-03 DIAGNOSIS — M06.042 RHEUMATOID ARTHRITIS INVOLVING BOTH HANDS WITH NEGATIVE RHEUMATOID FACTOR (HCC): ICD-10-CM

## 2021-09-03 DIAGNOSIS — M06.041 RHEUMATOID ARTHRITIS INVOLVING BOTH HANDS WITH NEGATIVE RHEUMATOID FACTOR (HCC): ICD-10-CM

## 2021-09-03 DIAGNOSIS — M19.049 CMC ARTHRITIS: ICD-10-CM

## 2021-09-03 DIAGNOSIS — Z95.1 HX OF CABG: ICD-10-CM

## 2021-09-03 DIAGNOSIS — G47.33 OBSTRUCTIVE SLEEP APNEA: ICD-10-CM

## 2021-09-03 NOTE — PROGRESS NOTES
9/3/2021  Spoke to Bryan for CCM. Updates to patient care team/comments: UTD. Patient reported changes in medications: together we reviewed with no updates. Med Adherence  Comment: pt reports taking all medications as prescribed.        Health M dx.           Care Manager Follow Up: in 1 month. Reason For Follow Up: review progress and or barriers towards patients goals. Care Managers Interventions: Continue to provide encouragement, support, and education for healthy coping and dx.   Time Sp

## 2021-09-03 NOTE — TELEPHONE ENCOUNTER
Triage: Good Day :)     Pt would like to know if Dr. Maki Bellamy can refill his Clonazepam to be sent to [facility]. Thank you in advance!

## 2021-09-07 RX ORDER — CLONAZEPAM 1 MG/1
1 TABLET ORAL NIGHTLY PRN
Qty: 30 TABLET | Refills: 0 | Status: SHIPPED | OUTPATIENT
Start: 2021-09-07 | End: 2021-09-16

## 2021-09-07 NOTE — TELEPHONE ENCOUNTER
Been Following CB  Last OV 11/12/20  Last CPE 11/12/20  Last Labs CMP, CBC, Lipase 7/13/21    Last Rx fill clonzaepam 1mg #30 0R 1/27/20    Future Appointments   Date Time Provider Edison Dey   9/16/2021 11:00 AM Tianna Elder PA-C [facility] [address]

## 2021-09-16 ENCOUNTER — OFFICE VISIT (OUTPATIENT)
Dept: INTERNAL MEDICINE CLINIC | Facility: CLINIC | Age: 71
End: 2021-09-16
Payer: MEDICARE

## 2021-09-16 VITALS
SYSTOLIC BLOOD PRESSURE: 126 MMHG | HEIGHT: 66 IN | TEMPERATURE: 97 F | DIASTOLIC BLOOD PRESSURE: 64 MMHG | WEIGHT: 198 LBS | RESPIRATION RATE: 16 BRPM | HEART RATE: 68 BPM | BODY MASS INDEX: 31.82 KG/M2

## 2021-09-16 DIAGNOSIS — Z12.11 COLON CANCER SCREENING: ICD-10-CM

## 2021-09-16 DIAGNOSIS — Z95.5 HISTORY OF CORONARY ARTERY STENT PLACEMENT: ICD-10-CM

## 2021-09-16 DIAGNOSIS — I25.118 CORONARY ARTERY DISEASE OF NATIVE ARTERY OF NATIVE HEART WITH STABLE ANGINA PECTORIS (HCC): Primary | ICD-10-CM

## 2021-09-16 DIAGNOSIS — Z23 NEED FOR INFLUENZA VACCINATION: ICD-10-CM

## 2021-09-16 PROCEDURE — 99214 OFFICE O/P EST MOD 30 MIN: CPT | Performed by: PHYSICIAN ASSISTANT

## 2021-09-16 PROCEDURE — G0008 ADMIN INFLUENZA VIRUS VAC: HCPCS | Performed by: PHYSICIAN ASSISTANT

## 2021-09-16 PROCEDURE — 90662 IIV NO PRSV INCREASED AG IM: CPT | Performed by: PHYSICIAN ASSISTANT

## 2021-09-16 RX ORDER — CLONAZEPAM 1 MG/1
1 TABLET ORAL NIGHTLY PRN
Qty: 90 TABLET | Refills: 0 | Status: SHIPPED | OUTPATIENT
Start: 2021-10-01 | End: 2021-12-23

## 2021-09-16 RX ORDER — DOXYCYCLINE HYCLATE 100 MG/1
100 CAPSULE ORAL DAILY
COMMUNITY
Start: 2021-08-26 | End: 2022-01-21 | Stop reason: CLARIF

## 2021-09-16 NOTE — PROGRESS NOTES
Patient presents with:  Medication Follow-Up: DD Rm 6,  Medication Reconcilation   Refill Request: Clonazepam 90 day supply      HPI:  Patient presents with a couple of issues to discuss.   Patient states he has been on clonazepam nightly for a REM sleep di HYPERLIPIDEMIA    • Hyperlipidemia    • Nosocomial pneumonia 6/19/2019   • Pneumonia, organism unspecified(486)    • Rheumatoid arthritis ([facility] [address]    • Shortness of breath    • Stented coronary artery        Patient Active Problem List:     Elevated PSA / Jasper Congress Take 1 tablet (40 mg total) by mouth every morning before breakfast. 90 tablet 0   • ketoconazole 2 % External Cream      • ALPRAZolam 0.25 MG Oral Tab Take 0.25 mg by mouth daily as needed.  Very rarely takes last taken 3 months ago form 5/18/21     • Clin heart with stable angina pectoris (hcc)  (primary encounter diagnosis) - Refer to Dr. Tianna FRANKS. History of coronary artery stent placement - as above. Colon cancer screening - Refer for colonoscopy. Need for influenza vaccination - Fluzone today.   O Instructions on file for this visit. All questions were answered and the patient understands the plan.

## 2021-09-27 RX ORDER — PANTOPRAZOLE SODIUM 40 MG/1
40 TABLET, DELAYED RELEASE ORAL
Qty: 90 TABLET | Refills: 1 | Status: SHIPPED | OUTPATIENT
Start: 2021-09-27

## 2021-09-27 NOTE — TELEPHONE ENCOUNTER
Been Following CB/AS  Last OV 9/16/21  Last CPE 11/12/20  Last Labs CMP, CBC 7/13/21    Last Rx fill Pantoprazole 40mg #90 0R 6/25/21    Future Appointments   Date Time Provider Edison Dey   12/14/2021  2:00 PM Raven Navarrete, PA-C [facility]

## 2021-09-27 NOTE — TELEPHONE ENCOUNTER
Prescription Refill Request - Patient advised can take 48-72 hours.       Name of Medication (strength, dose, qty requested:   Pantoprazole Sodium 40 MG Oral Tab EC 90 tablet 0 6/25/2021    Sig:   Take 1 tablet (40 mg total) by mouth every morning before br

## 2021-09-29 ENCOUNTER — TELEPHONE (OUTPATIENT)
Dept: NEUROLOGY | Age: 71
End: 2021-09-29

## 2021-09-30 PROCEDURE — 99490 CHRNC CARE MGMT STAFF 1ST 20: CPT

## 2021-10-05 ENCOUNTER — PATIENT OUTREACH (OUTPATIENT)
Dept: CASE MANAGEMENT | Age: 71
End: 2021-10-05

## 2021-10-05 DIAGNOSIS — I25.118 CORONARY ARTERY DISEASE OF NATIVE ARTERY OF NATIVE HEART WITH STABLE ANGINA PECTORIS (HCC): ICD-10-CM

## 2021-10-05 DIAGNOSIS — M19.049 CMC ARTHRITIS: ICD-10-CM

## 2021-10-05 DIAGNOSIS — I10 ESSENTIAL HYPERTENSION: ICD-10-CM

## 2021-10-05 DIAGNOSIS — Z95.1 HX OF CABG: ICD-10-CM

## 2021-10-05 DIAGNOSIS — M06.041 RHEUMATOID ARTHRITIS INVOLVING BOTH HANDS WITH NEGATIVE RHEUMATOID FACTOR (HCC): ICD-10-CM

## 2021-10-05 DIAGNOSIS — M06.042 RHEUMATOID ARTHRITIS INVOLVING BOTH HANDS WITH NEGATIVE RHEUMATOID FACTOR (HCC): ICD-10-CM

## 2021-10-05 DIAGNOSIS — M65.4 DE QUERVAIN'S DISEASE (TENOSYNOVITIS): ICD-10-CM

## 2021-10-05 DIAGNOSIS — G47.33 OBSTRUCTIVE SLEEP APNEA: ICD-10-CM

## 2021-10-05 DIAGNOSIS — F01.50 VASCULAR DEMENTIA WITHOUT BEHAVIORAL DISTURBANCE (HCC): ICD-10-CM

## 2021-10-05 DIAGNOSIS — E78.00 ELEVATED CHOLESTEROL: ICD-10-CM

## 2021-10-05 NOTE — PROGRESS NOTES
10/5/2021  Spoke to Bryan for CCM. Updates to patient care team/comments: UTD. Patient reported changes in medications: together we reviewed with no updates. Med Adherence  Comment: pt reports taking all medications as prescribed.        Health screening - Refer for colonoscopy. Need for influenza vaccination - Fluzone today. VINCENT with CPAP use - per pt, he has NEVER used a CPAP machine.   States he has a REM sleep disorder that is treated with Clonazepam.  I do not see any record of this in his

## 2021-10-08 ENCOUNTER — TELEPHONE (OUTPATIENT)
Dept: INTERNAL MEDICINE CLINIC | Facility: CLINIC | Age: 71
End: 2021-10-08

## 2021-10-18 ENCOUNTER — TELEPHONE (OUTPATIENT)
Dept: CASE MANAGEMENT | Age: 71
End: 2021-10-18

## 2021-10-18 ENCOUNTER — PATIENT OUTREACH (OUTPATIENT)
Dept: CASE MANAGEMENT | Age: 71
End: 2021-10-18

## 2021-10-18 NOTE — PROGRESS NOTES
Pt states back a while ago when he was in the ER pt was given Dicyclomine and he is running out. Pt wondering if Dr. Jonah Guidry can refill this medication? Notified I would contact Dr. Jonah Guidry and a nurse will call him back for further assessment.   Judi Burgos

## 2021-10-18 NOTE — TELEPHONE ENCOUNTER
Triage: Good Day :)     Pt states back a while ago when he was in the ER pt was given Dicyclomine and he is running out. Pt wondering if Dr. Ric Magdaleno can refill this medication?   Notified I would contact Dr. Ric Magdaleno and a nurse will call him back for f

## 2021-10-19 NOTE — TELEPHONE ENCOUNTER
LOV 9/16/21 with CB.    7/13/21 ER visit:      [facility]   Patient was stable throughout his stay in the emergency department. On reevaluation patient had no right lower quadrant tenderness but still mild tenderness epigastric left lower quadrant.   Patient is w

## 2021-10-19 NOTE — TELEPHONE ENCOUNTER
Patient states he received Bentyl in the ER, pt was having above the waist, center stomach discomfort, given Bentyl which took the discomfort away.   Pt denies any other symptoms, normal BM's for the most part, does have loose stools occasionally which he u

## 2021-10-19 NOTE — TELEPHONE ENCOUNTER
I will consider refilling but I need more details. How often does he have the pain? Where is it? Associated stool changes? Does Bentyl help? How often does he use it? Pt due for f/u in November. I do not see that he is scheduled.   Please remind hi

## 2021-10-31 PROCEDURE — 99490 CHRNC CARE MGMT STAFF 1ST 20: CPT

## 2021-11-03 RX ORDER — NITROGLYCERIN 0.4 MG/1
TABLET SUBLINGUAL
Qty: 25 TABLET | Refills: 0 | Status: SHIPPED | OUTPATIENT
Start: 2021-11-03 | End: 2022-07-27

## 2021-11-04 DIAGNOSIS — I50.42 CHRONIC COMBINED SYSTOLIC AND DIASTOLIC HEART FAILURE (CMD): Primary | ICD-10-CM

## 2021-11-05 ENCOUNTER — PATIENT OUTREACH (OUTPATIENT)
Dept: CASE MANAGEMENT | Age: 71
End: 2021-11-05

## 2021-11-05 ENCOUNTER — APPOINTMENT (OUTPATIENT)
Dept: CARDIOLOGY | Age: 71
End: 2021-11-05

## 2021-11-09 ENCOUNTER — OFFICE VISIT (OUTPATIENT)
Dept: CARDIOLOGY | Age: 71
End: 2021-11-09

## 2021-11-09 VITALS
DIASTOLIC BLOOD PRESSURE: 69 MMHG | RESPIRATION RATE: 18 BRPM | BODY MASS INDEX: 34.81 KG/M2 | OXYGEN SATURATION: 98 % | HEART RATE: 77 BPM | SYSTOLIC BLOOD PRESSURE: 126 MMHG | WEIGHT: 202.82 LBS | TEMPERATURE: 98.1 F

## 2021-11-09 DIAGNOSIS — E78.5 DYSLIPIDEMIA: Primary | ICD-10-CM

## 2021-11-09 DIAGNOSIS — I10 BENIGN ESSENTIAL HYPERTENSION: Chronic | ICD-10-CM

## 2021-11-09 DIAGNOSIS — I25.118 ATHEROSCLEROSIS OF NATIVE CORONARY ARTERY OF NATIVE HEART WITH STABLE ANGINA PECTORIS (CMD): Chronic | ICD-10-CM

## 2021-11-09 DIAGNOSIS — I20.89 CHRONIC STABLE ANGINA: ICD-10-CM

## 2021-11-09 DIAGNOSIS — Z95.5 S/P PRIMARY ANGIOPLASTY WITH CORONARY STENT: Chronic | ICD-10-CM

## 2021-11-09 DIAGNOSIS — I21.4 NSTEMI (NON-ST ELEVATED MYOCARDIAL INFARCTION) (CMD): Chronic | ICD-10-CM

## 2021-11-09 DIAGNOSIS — Z95.1 HX OF CABG: Chronic | ICD-10-CM

## 2021-11-09 DIAGNOSIS — M47.812 SPONDYLOSIS OF CERVICAL REGION WITHOUT MYELOPATHY OR RADICULOPATHY: ICD-10-CM

## 2021-11-09 DIAGNOSIS — E78.2 MIXED HYPERLIPIDEMIA: Chronic | ICD-10-CM

## 2021-11-09 DIAGNOSIS — K76.0 STEATOSIS OF LIVER: ICD-10-CM

## 2021-11-09 DIAGNOSIS — Z86.73 HISTORY OF TRANSIENT ISCHEMIC ATTACK (TIA): Chronic | ICD-10-CM

## 2021-11-09 LAB
CHOLEST SERPL-MCNC: 126 MG/DL
CHOLEST/HDLC SERPL: 3.1 {RATIO}
FASTING DURATION TIME PATIENT: NORMAL H
HDLC SERPL-MCNC: 41 MG/DL
LDLC SERPL CALC-MCNC: 72 MG/DL
NONHDLC SERPL-MCNC: 85 MG/DL
TRIGL SERPL-MCNC: 64 MG/DL

## 2021-11-09 PROCEDURE — 80061 LIPID PANEL: CPT

## 2021-11-09 PROCEDURE — 99214 OFFICE O/P EST MOD 30 MIN: CPT | Performed by: INTERNAL MEDICINE

## 2021-11-09 PROCEDURE — 36415 COLL VENOUS BLD VENIPUNCTURE: CPT

## 2021-11-09 RX ORDER — DOXYCYCLINE HYCLATE 100 MG/1
100 CAPSULE ORAL DAILY
COMMUNITY
Start: 2021-08-26

## 2021-11-09 RX ORDER — DOXAZOSIN 8 MG/1
8 TABLET ORAL DAILY
COMMUNITY
Start: 2021-11-01 | End: 2023-04-25

## 2021-11-09 RX ORDER — CLINDAMYCIN PHOSPHATE 10 UG/ML
LOTION TOPICAL
COMMUNITY
Start: 2021-08-26 | End: 2023-03-30 | Stop reason: ALTCHOICE

## 2021-11-09 RX ORDER — CLONAZEPAM 1 MG/1
1 TABLET ORAL
COMMUNITY
Start: 2021-10-01 | End: 2023-04-25

## 2021-11-09 ASSESSMENT — ENCOUNTER SYMPTOMS
CHILLS: 0
WEIGHT LOSS: 0
COUGH: 0
HEMOPTYSIS: 0
BRUISES/BLEEDS EASILY: 0
BACK PAIN: 1
WEIGHT GAIN: 0
SUSPICIOUS LESIONS: 0
HEMATOCHEZIA: 0
FEVER: 0

## 2021-11-10 ENCOUNTER — PATIENT OUTREACH (OUTPATIENT)
Dept: CASE MANAGEMENT | Age: 71
End: 2021-11-10

## 2021-11-10 DIAGNOSIS — R97.20 ELEVATED PSA: ICD-10-CM

## 2021-11-10 DIAGNOSIS — M65.4 DE QUERVAIN'S DISEASE (TENOSYNOVITIS): ICD-10-CM

## 2021-11-10 DIAGNOSIS — F01.50 VASCULAR DEMENTIA WITHOUT BEHAVIORAL DISTURBANCE (HCC): ICD-10-CM

## 2021-11-10 DIAGNOSIS — G47.33 OBSTRUCTIVE SLEEP APNEA: ICD-10-CM

## 2021-11-10 DIAGNOSIS — E78.00 ELEVATED CHOLESTEROL: ICD-10-CM

## 2021-11-10 DIAGNOSIS — Z95.1 HX OF CABG: ICD-10-CM

## 2021-11-10 DIAGNOSIS — I10 ESSENTIAL HYPERTENSION: ICD-10-CM

## 2021-11-10 DIAGNOSIS — M19.049 CMC ARTHRITIS: ICD-10-CM

## 2021-11-10 DIAGNOSIS — I25.118 CORONARY ARTERY DISEASE OF NATIVE ARTERY OF NATIVE HEART WITH STABLE ANGINA PECTORIS (HCC): ICD-10-CM

## 2021-11-10 DIAGNOSIS — Z95.5 HISTORY OF CORONARY ARTERY STENT PLACEMENT: ICD-10-CM

## 2021-11-10 DIAGNOSIS — M06.041 RHEUMATOID ARTHRITIS INVOLVING BOTH HANDS WITH NEGATIVE RHEUMATOID FACTOR (HCC): ICD-10-CM

## 2021-11-10 DIAGNOSIS — M06.042 RHEUMATOID ARTHRITIS INVOLVING BOTH HANDS WITH NEGATIVE RHEUMATOID FACTOR (HCC): ICD-10-CM

## 2021-11-10 DIAGNOSIS — K76.0 STEATOSIS OF LIVER: ICD-10-CM

## 2021-11-10 NOTE — PROGRESS NOTES
11/10/2021  Spoke to Ramy Mendez and wife Berry Wooten (HIPPA verified) for CCM. Updates to patient care team/comments: UTD. Patient reported changes in medications: together we reviewed with no updates.    Med Adherence  Comment: pt reports taking all medica 1 year if those results are well controlled, sooner if any new symptoms arise. Diet: good.  For awhile he didn't feel like eating but his appetite has come back and he has been trying to create a healthy diet.   Tries not skipping meals but lack of appet

## 2021-11-11 RX ORDER — METOPROLOL SUCCINATE 50 MG/1
50 TABLET, EXTENDED RELEASE ORAL 2 TIMES DAILY
Qty: 60 TABLET | Refills: 11 | Status: SHIPPED | OUTPATIENT
Start: 2021-11-11 | End: 2022-12-05

## 2021-11-11 RX ORDER — ISOSORBIDE MONONITRATE 60 MG/1
180 TABLET, EXTENDED RELEASE ORAL DAILY
Qty: 90 TABLET | Refills: 11 | Status: SHIPPED | OUTPATIENT
Start: 2021-11-11 | End: 2022-12-05

## 2021-11-12 ENCOUNTER — TELEPHONE (OUTPATIENT)
Dept: CARDIOLOGY | Age: 71
End: 2021-11-12

## 2021-11-12 DIAGNOSIS — E78.2 MIXED HYPERLIPIDEMIA: ICD-10-CM

## 2021-11-12 DIAGNOSIS — I25.118 ATHEROSCLEROSIS OF NATIVE CORONARY ARTERY OF NATIVE HEART WITH STABLE ANGINA PECTORIS (CMD): ICD-10-CM

## 2021-11-12 DIAGNOSIS — Z95.1 HX OF CABG: Primary | ICD-10-CM

## 2021-11-29 ENCOUNTER — TELEPHONE (OUTPATIENT)
Dept: INTERNAL MEDICINE CLINIC | Facility: CLINIC | Age: 71
End: 2021-11-29

## 2021-11-29 DIAGNOSIS — Z00.00 ROUTINE GENERAL MEDICAL EXAMINATION AT A HEALTH CARE FACILITY: Primary | ICD-10-CM

## 2021-11-29 DIAGNOSIS — E78.00 ELEVATED CHOLESTEROL: ICD-10-CM

## 2021-11-29 DIAGNOSIS — I10 ESSENTIAL HYPERTENSION: ICD-10-CM

## 2021-11-29 NOTE — TELEPHONE ENCOUNTER
Wellness   Future Appointments   Date Time Provider Edison Metcalfi   12/2/2021 11:15 AM Danni Kim MD [facility]   12/9/2021  1:00 PM Kely Angeles MD [facility]      Orders to  [facility]  aware must fast no call back required

## 2021-11-29 NOTE — TELEPHONE ENCOUNTER
[facility].   Pending to [facility] for approval

## 2021-11-30 PROCEDURE — 99490 CHRNC CARE MGMT STAFF 1ST 20: CPT

## 2021-12-02 ENCOUNTER — LAB ENCOUNTER (OUTPATIENT)
Dept: LAB | Age: 71
End: 2021-12-02
Attending: FAMILY MEDICINE
Payer: MEDICARE

## 2021-12-02 ENCOUNTER — OFFICE VISIT (OUTPATIENT)
Dept: INTERNAL MEDICINE CLINIC | Facility: CLINIC | Age: 71
End: 2021-12-02
Payer: MEDICARE

## 2021-12-02 VITALS
SYSTOLIC BLOOD PRESSURE: 100 MMHG | HEIGHT: 66 IN | WEIGHT: 206.38 LBS | RESPIRATION RATE: 18 BRPM | BODY MASS INDEX: 33.17 KG/M2 | HEART RATE: 64 BPM | DIASTOLIC BLOOD PRESSURE: 68 MMHG

## 2021-12-02 DIAGNOSIS — R97.20 ELEVATED PSA: ICD-10-CM

## 2021-12-02 DIAGNOSIS — I25.118 CORONARY ARTERY DISEASE OF NATIVE ARTERY OF NATIVE HEART WITH STABLE ANGINA PECTORIS (HCC): ICD-10-CM

## 2021-12-02 DIAGNOSIS — Z12.11 SCREENING FOR COLON CANCER: ICD-10-CM

## 2021-12-02 DIAGNOSIS — E78.00 ELEVATED CHOLESTEROL: ICD-10-CM

## 2021-12-02 DIAGNOSIS — Z95.1 HX OF CABG: ICD-10-CM

## 2021-12-02 DIAGNOSIS — N40.1 BPH ASSOCIATED WITH NOCTURIA: ICD-10-CM

## 2021-12-02 DIAGNOSIS — H91.93 DECREASED HEARING OF BOTH EARS: ICD-10-CM

## 2021-12-02 DIAGNOSIS — I10 ESSENTIAL HYPERTENSION: ICD-10-CM

## 2021-12-02 DIAGNOSIS — Z00.00 ENCOUNTER FOR ANNUAL HEALTH EXAMINATION: Primary | ICD-10-CM

## 2021-12-02 DIAGNOSIS — M15.9 PRIMARY OSTEOARTHRITIS INVOLVING MULTIPLE JOINTS: ICD-10-CM

## 2021-12-02 DIAGNOSIS — F41.9 ANXIETY: ICD-10-CM

## 2021-12-02 DIAGNOSIS — R35.1 BPH ASSOCIATED WITH NOCTURIA: ICD-10-CM

## 2021-12-02 DIAGNOSIS — Z00.00 ROUTINE GENERAL MEDICAL EXAMINATION AT A HEALTH CARE FACILITY: ICD-10-CM

## 2021-12-02 DIAGNOSIS — F01.50 VASCULAR DEMENTIA WITHOUT BEHAVIORAL DISTURBANCE (HCC): ICD-10-CM

## 2021-12-02 PROCEDURE — 84153 ASSAY OF PSA TOTAL: CPT

## 2021-12-02 PROCEDURE — 80053 COMPREHEN METABOLIC PANEL: CPT

## 2021-12-02 PROCEDURE — G0439 PPPS, SUBSEQ VISIT: HCPCS | Performed by: FAMILY MEDICINE

## 2021-12-02 PROCEDURE — 85025 COMPLETE CBC W/AUTO DIFF WBC: CPT

## 2021-12-02 PROCEDURE — 80061 LIPID PANEL: CPT

## 2021-12-02 PROCEDURE — 36415 COLL VENOUS BLD VENIPUNCTURE: CPT

## 2021-12-02 NOTE — PROGRESS NOTES
HPI:   Lloyd Connell is a 66year old male who presents for a Medicare Subsequent Annual Wellness visit (Pt already had Initial Annual Wellness). Lloyd Connell is presenting for his wellness exam. He is in his usual state of health.  No Pack years: 5        Quit date: 6/15/1980        Years since quittin.4      Smokeless tobacco: Never Used       Mr. Choudhury Congress already takes aspirin and has it on his medication list.   CAGE screening score of 0 on 2021, showing low risk of alcohol a instability     History of coronary artery stent placement     Vascular dementia without behavioral disturbance (HCC)    Wt Readings from Last 3 Encounters:  12/02/21 : 206 lb 6.4 oz (93.6 kg)  09/16/21 : 198 lb (89.8 kg)  07/13/21 : 198 lb (89.8 kg)     L hours as needed for Pain. nitroGLYCERIN 0.4 MG Sublingual SL Tab, Place 1 tablet (0.4 mg total) under the tongue every 5 (five) minutes as needed for Chest pain. Call physician  Clopidogrel Bisulfate (PLAVIX) 75 MG Oral Tab, Take 75 mg by mouth daily. His family history includes Heart Disorder in his brother and mother. SOCIAL HISTORY:   He  reports that he quit smoking about 41 years ago. He has a 5.00 pack-year smoking history.  He has never used smokeless tobacco. He reports previous alcohol use o 40mg Inj 04/19/2012   • FLU VAC High Dose 65 YRS & Older PRSV Free (09914) 09/01/2019, 09/10/2020, 09/16/2021   • Kenalog Per 10mg Inj 06/21/2016   • Pneumococcal (Prevnar 13) 10/17/2014   • Pneumovax 23 10/15/2019        ASSESSMENT AND OTHER RELEVANT  physician but may not be covered, or covered at this frequency, by your insurer. Please check with your insurance carrier before scheduling to verify coverage.    PREVENTATIVE SERVICES FREQUENCY &  COVERAGE DETAILS LAST COMPLETION DATE   Diabetes Screenin Yderviean69) covered once after 65 Prevnar 13: 10/17/2014    Rospyallj65: 10/15/2019     No recommendations at this time    Hepatitis B One screening covered for patients with certain risk factors   -  No recommendations at this time    Tetanus Toxoid Not

## 2021-12-02 NOTE — PATIENT INSTRUCTIONS
Geno Hawkins Jr.'s SCREENING SCHEDULE   Tests on this list are recommended by your physician but may not be covered, or covered at this frequency, by your insurer. Please check with your insurance carrier before scheduling to verify coverage.    NM 09/16/2021  No recommendations at this time    Pneumococcal Each vaccine (Fwdytfg09 & Mkyyefxpa53) covered once after 65 Prevnar 13: 10/17/2014    Vqbrtwfye65: 10/15/2019     No recommendations at this time    Hepatitis B One screening covered for patients

## 2021-12-03 ENCOUNTER — TELEPHONE (OUTPATIENT)
Dept: INTERNAL MEDICINE CLINIC | Facility: CLINIC | Age: 71
End: 2021-12-03

## 2021-12-15 ENCOUNTER — PATIENT OUTREACH (OUTPATIENT)
Dept: CASE MANAGEMENT | Age: 71
End: 2021-12-15

## 2021-12-22 ENCOUNTER — PATIENT OUTREACH (OUTPATIENT)
Dept: CASE MANAGEMENT | Age: 71
End: 2021-12-22

## 2021-12-22 DIAGNOSIS — E78.00 ELEVATED CHOLESTEROL: ICD-10-CM

## 2021-12-22 DIAGNOSIS — M06.042 RHEUMATOID ARTHRITIS INVOLVING BOTH HANDS WITH NEGATIVE RHEUMATOID FACTOR (HCC): ICD-10-CM

## 2021-12-22 DIAGNOSIS — I10 ESSENTIAL HYPERTENSION: ICD-10-CM

## 2021-12-22 DIAGNOSIS — G47.33 OBSTRUCTIVE SLEEP APNEA: ICD-10-CM

## 2021-12-22 DIAGNOSIS — Z95.1 HX OF CABG: ICD-10-CM

## 2021-12-22 DIAGNOSIS — F01.50 VASCULAR DEMENTIA WITHOUT BEHAVIORAL DISTURBANCE (HCC): ICD-10-CM

## 2021-12-22 DIAGNOSIS — Z95.5 HISTORY OF CORONARY ARTERY STENT PLACEMENT: ICD-10-CM

## 2021-12-22 DIAGNOSIS — M19.049 CMC ARTHRITIS: ICD-10-CM

## 2021-12-22 DIAGNOSIS — Z98.61 S/P PTCA (PERCUTANEOUS TRANSLUMINAL CORONARY ANGIOPLASTY): ICD-10-CM

## 2021-12-22 DIAGNOSIS — R97.20 ELEVATED PSA: ICD-10-CM

## 2021-12-22 DIAGNOSIS — M06.041 RHEUMATOID ARTHRITIS INVOLVING BOTH HANDS WITH NEGATIVE RHEUMATOID FACTOR (HCC): ICD-10-CM

## 2021-12-22 DIAGNOSIS — K76.0 STEATOSIS OF LIVER: ICD-10-CM

## 2021-12-22 DIAGNOSIS — I25.118 CORONARY ARTERY DISEASE OF NATIVE ARTERY OF NATIVE HEART WITH STABLE ANGINA PECTORIS (HCC): ICD-10-CM

## 2021-12-22 RX ORDER — CLOPIDOGREL BISULFATE 75 MG/1
75 TABLET ORAL DAILY
Qty: 90 TABLET | Refills: 3 | Status: SHIPPED | OUTPATIENT
Start: 2021-12-22 | End: 2023-01-30

## 2021-12-22 NOTE — PROGRESS NOTES
12/22/2021  Spoke to Juan's wife (HIPPA verified) for CCM. Updates to patient care team/comments: UTD. Patient reported changes in medications: together we reviewed with no updates.    Med Adherence  Comment: pt reports taking all medications as encouragement, support, and education for healthy coping and dx. Time Spent This Encounter Total: 20 min medical record review, telephone communication, care plan updates where needed, education, goals and action plan recreation/update.  Provided acknowled

## 2021-12-23 RX ORDER — CLONAZEPAM 1 MG/1
TABLET ORAL
Qty: 90 TABLET | Refills: 0 | Status: SHIPPED | OUTPATIENT
Start: 2021-12-23

## 2021-12-23 RX ORDER — CLONAZEPAM 1 MG/1
TABLET ORAL
Qty: 90 TABLET | Refills: 0 | OUTPATIENT
Start: 2021-12-23

## 2021-12-28 ENCOUNTER — TELEPHONE (OUTPATIENT)
Dept: CARDIOLOGY | Age: 71
End: 2021-12-28

## 2021-12-29 ENCOUNTER — TELEPHONE (OUTPATIENT)
Dept: INTERNAL MEDICINE CLINIC | Facility: CLINIC | Age: 71
End: 2021-12-29

## 2021-12-29 NOTE — TELEPHONE ENCOUNTER
[facility] notified [facility] to sign orders. In need of face to face.

## 2021-12-29 NOTE — TELEPHONE ENCOUNTER
Pt in need of face to face. Last physical with [facility] on 12/2/2021. [facility], will you sign orders?

## 2021-12-30 ENCOUNTER — TELEPHONE (OUTPATIENT)
Dept: INTERNAL MEDICINE CLINIC | Facility: CLINIC | Age: 71
End: 2021-12-30

## 2021-12-30 NOTE — TELEPHONE ENCOUNTER
Patient got out of the hospital yesterday and has some questions. Patient states he wants to talk to triage.

## 2021-12-31 PROCEDURE — 99490 CHRNC CARE MGMT STAFF 1ST 20: CPT

## 2021-12-31 NOTE — TELEPHONE ENCOUNTER
Patient states he was in the hospital 12/26/21 for a couple of days with LLL pneumonia concerned he will get c dif again. Pt notified he can try a probiotic and he said he forgot they wanted him to try yogurt too. Pt notified to try those things but if he has problems he should call our office immediately. Patient scheduled a HFU with [address] for 1/4/22. Pt verbalizes understanding.  FYI to [address]

## 2022-01-01 ENCOUNTER — EXTERNAL RECORD (OUTPATIENT)
Dept: HEALTH INFORMATION MANAGEMENT | Age: 72
End: 2022-01-01

## 2022-01-04 ENCOUNTER — LAB ENCOUNTER (OUTPATIENT)
Dept: LAB | Age: 72
End: 2022-01-04
Attending: INTERNAL MEDICINE
Payer: MEDICARE

## 2022-01-04 ENCOUNTER — OFFICE VISIT (OUTPATIENT)
Dept: INTERNAL MEDICINE CLINIC | Facility: CLINIC | Age: 72
End: 2022-01-04
Payer: MEDICARE

## 2022-01-04 VITALS
HEIGHT: 65 IN | DIASTOLIC BLOOD PRESSURE: 56 MMHG | OXYGEN SATURATION: 97 % | TEMPERATURE: 97 F | RESPIRATION RATE: 18 BRPM | BODY MASS INDEX: 33.26 KG/M2 | WEIGHT: 199.63 LBS | SYSTOLIC BLOOD PRESSURE: 104 MMHG | HEART RATE: 66 BPM

## 2022-01-04 DIAGNOSIS — R41.0 CONFUSION: ICD-10-CM

## 2022-01-04 DIAGNOSIS — G93.41 ACUTE METABOLIC ENCEPHALOPATHY: Primary | ICD-10-CM

## 2022-01-04 DIAGNOSIS — J18.9 PNEUMONIA OF LEFT LOWER LOBE DUE TO INFECTIOUS ORGANISM: ICD-10-CM

## 2022-01-04 DIAGNOSIS — D69.6 DECREASED PLATELET COUNT (HCC): ICD-10-CM

## 2022-01-04 DIAGNOSIS — I25.2 H/O NON-ST ELEVATION MYOCARDIAL INFARCTION (NSTEMI): ICD-10-CM

## 2022-01-04 DIAGNOSIS — R71.0 DECREASED HEMOGLOBIN: ICD-10-CM

## 2022-01-04 DIAGNOSIS — R19.7 ACUTE DIARRHEA: ICD-10-CM

## 2022-01-04 LAB
ALBUMIN SERPL-MCNC: 3.8 G/DL (ref 3.4–5)
ALBUMIN/GLOB SERPL: 1.2 {RATIO} (ref 1–2)
ALP LIVER SERPL-CCNC: 72 U/L
ALT SERPL-CCNC: 34 U/L
ANION GAP SERPL CALC-SCNC: 5 MMOL/L (ref 0–18)
AST SERPL-CCNC: 29 U/L (ref 15–37)
BASOPHILS # BLD AUTO: 0.02 X10(3) UL (ref 0–0.2)
BASOPHILS NFR BLD AUTO: 0.5 %
BILIRUB SERPL-MCNC: 0.6 MG/DL (ref 0.1–2)
BUN BLD-MCNC: 18 MG/DL (ref 7–18)
CALCIUM BLD-MCNC: 8.6 MG/DL (ref 8.5–10.1)
CHLORIDE SERPL-SCNC: 109 MMOL/L (ref 98–112)
CO2 SERPL-SCNC: 28 MMOL/L (ref 21–32)
CREAT BLD-MCNC: 1.17 MG/DL
EOSINOPHIL # BLD AUTO: 0.03 X10(3) UL (ref 0–0.7)
EOSINOPHIL NFR BLD AUTO: 0.8 %
ERYTHROCYTE [DISTWIDTH] IN BLOOD BY AUTOMATED COUNT: 14.6 %
FASTING STATUS PATIENT QL REPORTED: NO
GLOBULIN PLAS-MCNC: 3.1 G/DL (ref 2.8–4.4)
GLUCOSE (URINE DIPSTICK): NEGATIVE MG/DL
GLUCOSE BLD-MCNC: 89 MG/DL (ref 70–99)
HCT VFR BLD AUTO: 39.7 %
HGB BLD-MCNC: 12.5 G/DL
IMM GRANULOCYTES # BLD AUTO: 0.03 X10(3) UL (ref 0–1)
IMM GRANULOCYTES NFR BLD: 0.8 %
KETONES (URINE DIPSTICK): NEGATIVE MG/DL
LEUKOCYTES: NEGATIVE
LYMPHOCYTES # BLD AUTO: 1.02 X10(3) UL (ref 1–4)
LYMPHOCYTES NFR BLD AUTO: 25.9 %
MCH RBC QN AUTO: 29.7 PG (ref 26–34)
MCHC RBC AUTO-ENTMCNC: 31.5 G/DL (ref 31–37)
MCV RBC AUTO: 94.3 FL
MONOCYTES # BLD AUTO: 0.64 X10(3) UL (ref 0.1–1)
MONOCYTES NFR BLD AUTO: 16.2 %
MULTISTIX LOT#: ABNORMAL NUMERIC
NEUTROPHILS # BLD AUTO: 2.2 X10 (3) UL (ref 1.5–7.7)
NEUTROPHILS # BLD AUTO: 2.2 X10(3) UL (ref 1.5–7.7)
NEUTROPHILS NFR BLD AUTO: 55.8 %
NITRITE, URINE: NEGATIVE
OCCULT BLOOD: NEGATIVE
OSMOLALITY SERPL CALC.SUM OF ELEC: 295 MOSM/KG (ref 275–295)
PH, URINE: 5.5 (ref 4.5–8)
PLATELET # BLD AUTO: 162 10(3)UL (ref 150–450)
POTASSIUM SERPL-SCNC: 4.2 MMOL/L (ref 3.5–5.1)
PROT SERPL-MCNC: 6.9 G/DL (ref 6.4–8.2)
PROTEIN (URINE DIPSTICK): 30 MG/DL
RBC # BLD AUTO: 4.21 X10(6)UL
SODIUM SERPL-SCNC: 142 MMOL/L (ref 136–145)
SPECIFIC GRAVITY: 1.03 (ref 1–1.03)
UROBILINOGEN,SEMI-QN: 0.2 MG/DL (ref 0–1.9)
WBC # BLD AUTO: 3.9 X10(3) UL (ref 4–11)

## 2022-01-04 PROCEDURE — 36415 COLL VENOUS BLD VENIPUNCTURE: CPT

## 2022-01-04 PROCEDURE — 1111F DSCHRG MED/CURRENT MED MERGE: CPT | Performed by: INTERNAL MEDICINE

## 2022-01-04 PROCEDURE — 80053 COMPREHEN METABOLIC PANEL: CPT

## 2022-01-04 PROCEDURE — 85025 COMPLETE CBC W/AUTO DIFF WBC: CPT

## 2022-01-04 PROCEDURE — 99214 OFFICE O/P EST MOD 30 MIN: CPT | Performed by: INTERNAL MEDICINE

## 2022-01-04 PROCEDURE — 81003 URINALYSIS AUTO W/O SCOPE: CPT | Performed by: INTERNAL MEDICINE

## 2022-01-04 RX ORDER — BENZONATATE 100 MG/1
100 CAPSULE ORAL 3 TIMES DAILY PRN
COMMUNITY
Start: 2021-12-28 | End: 2022-01-21 | Stop reason: CLARIF

## 2022-01-04 NOTE — PROGRESS NOTES
Blanca Schaffer. is a 66year old male. Patient presents with: Follow - Up: ES rm - 4 - Follow up for pneumonia 12/26 ED for  Acute metabolic encephalopathy. HPI:     Patient here for HFU.  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 • nitroGLYCERIN 0.4 MG Sublingual SL Tab Place 1 tablet (0.4 mg total) under the tongue every 5 (five) minutes as needed for Chest pain.  Call physician 100 tablet 0   • Clopidogrel Bisulfate (PLAVIX) 75 MG Oral Tab Take 75 mg by mouth (Temporal)   Resp 18   Ht 5' 5\" (1.651 m)   Wt 199 lb 9.6 oz (90.5 kg)   SpO2 97%   BMI 33.22 kg/m²   GENERAL: well developed, well nourished,in no apparent distress  HEENT: atraumatic, normocephalic  NECK: supple,no adenopathy  LUNGS: normal rate without

## 2022-01-06 ENCOUNTER — TELEPHONIC VISIT (OUTPATIENT)
Dept: CARDIOLOGY | Age: 72
End: 2022-01-06

## 2022-01-06 DIAGNOSIS — I10 BENIGN ESSENTIAL HYPERTENSION: Chronic | ICD-10-CM

## 2022-01-06 DIAGNOSIS — I20.89 CHRONIC STABLE ANGINA: ICD-10-CM

## 2022-01-06 DIAGNOSIS — Z95.1 HX OF CABG: Chronic | ICD-10-CM

## 2022-01-06 DIAGNOSIS — I25.118 ATHEROSCLEROSIS OF NATIVE CORONARY ARTERY OF NATIVE HEART WITH STABLE ANGINA PECTORIS (CMD): Primary | Chronic | ICD-10-CM

## 2022-01-06 DIAGNOSIS — E78.2 MIXED HYPERLIPIDEMIA: Chronic | ICD-10-CM

## 2022-01-06 DIAGNOSIS — Z95.5 S/P PRIMARY ANGIOPLASTY WITH CORONARY STENT: Chronic | ICD-10-CM

## 2022-01-06 PROCEDURE — 99443 TELEPHONE E&M BY PHYSICIAN EST PT NOT ORIG PREV 7 DAYS 21-30 MIN: CPT | Performed by: INTERNAL MEDICINE

## 2022-01-06 ASSESSMENT — ENCOUNTER SYMPTOMS
SUSPICIOUS LESIONS: 0
ALLERGIC/IMMUNOLOGIC COMMENTS: NO NEW FOOD ALLERGIES
FEVER: 0
WEIGHT GAIN: 0
BRUISES/BLEEDS EASILY: 0
HEMOPTYSIS: 0
CHILLS: 0
WEIGHT LOSS: 0
HEMATOCHEZIA: 0
COUGH: 0

## 2022-01-07 ENCOUNTER — PATIENT OUTREACH (OUTPATIENT)
Dept: CASE MANAGEMENT | Age: 72
End: 2022-01-07

## 2022-01-07 DIAGNOSIS — F01.50 VASCULAR DEMENTIA WITHOUT BEHAVIORAL DISTURBANCE (HCC): ICD-10-CM

## 2022-01-07 DIAGNOSIS — I25.118 CORONARY ARTERY DISEASE OF NATIVE ARTERY OF NATIVE HEART WITH STABLE ANGINA PECTORIS (HCC): ICD-10-CM

## 2022-01-07 DIAGNOSIS — E78.00 ELEVATED CHOLESTEROL: ICD-10-CM

## 2022-01-07 DIAGNOSIS — Z95.1 HX OF CABG: ICD-10-CM

## 2022-01-07 DIAGNOSIS — Z95.5 HISTORY OF CORONARY ARTERY STENT PLACEMENT: ICD-10-CM

## 2022-01-07 DIAGNOSIS — M06.042 RHEUMATOID ARTHRITIS INVOLVING BOTH HANDS WITH NEGATIVE RHEUMATOID FACTOR (HCC): ICD-10-CM

## 2022-01-07 DIAGNOSIS — K76.0 STEATOSIS OF LIVER: ICD-10-CM

## 2022-01-07 DIAGNOSIS — M65.4 DE QUERVAIN'S DISEASE (TENOSYNOVITIS): ICD-10-CM

## 2022-01-07 DIAGNOSIS — M06.041 RHEUMATOID ARTHRITIS INVOLVING BOTH HANDS WITH NEGATIVE RHEUMATOID FACTOR (HCC): ICD-10-CM

## 2022-01-07 DIAGNOSIS — G47.33 OBSTRUCTIVE SLEEP APNEA: ICD-10-CM

## 2022-01-07 DIAGNOSIS — R97.20 ELEVATED PSA: ICD-10-CM

## 2022-01-07 DIAGNOSIS — I10 ESSENTIAL HYPERTENSION: ICD-10-CM

## 2022-01-07 DIAGNOSIS — M19.049 CMC ARTHRITIS: ICD-10-CM

## 2022-01-07 NOTE — PROGRESS NOTES
1/7/2022  Spoke to Bryan for CCM. Updates to patient care team/comments: UTD. Patient reported changes in medications: together we reviewed with no updates. Med Adherence  Comment: pt reports taking all medications as prescribed.        Patient resolved since starting probiotic    Reiterated the above together. Pt states he is awaiting labs result. Notified to call Dr. Owen Duvall office back because below they tried calling him to discuss.       Jesus Harrington RN   1/5/2022  9:48 AM CST

## 2022-01-10 ENCOUNTER — APPOINTMENT (OUTPATIENT)
Dept: GENERAL RADIOLOGY | Facility: HOSPITAL | Age: 72
End: 2022-01-10
Attending: EMERGENCY MEDICINE
Payer: MEDICARE

## 2022-01-10 ENCOUNTER — HOSPITAL ENCOUNTER (OUTPATIENT)
Facility: HOSPITAL | Age: 72
Setting detail: OBSERVATION
Discharge: HOME | End: 2022-01-13
Attending: EMERGENCY MEDICINE | Admitting: INTERNAL MEDICINE
Payer: MEDICARE

## 2022-01-10 ENCOUNTER — APPOINTMENT (OUTPATIENT)
Dept: CT IMAGING | Facility: HOSPITAL | Age: 72
End: 2022-01-10
Attending: EMERGENCY MEDICINE
Payer: MEDICARE

## 2022-01-10 DIAGNOSIS — R53.1 WEAKNESS GENERALIZED: ICD-10-CM

## 2022-01-10 DIAGNOSIS — U07.1 COVID-19: ICD-10-CM

## 2022-01-10 DIAGNOSIS — R47.01 EXPRESSIVE APHASIA: Primary | ICD-10-CM

## 2022-01-10 LAB
ALBUMIN SERPL-MCNC: 3.6 G/DL (ref 3.4–5)
ALBUMIN/GLOB SERPL: 1 {RATIO} (ref 1–2)
ALP LIVER SERPL-CCNC: 82 U/L
ALT SERPL-CCNC: 31 U/L
ANION GAP SERPL CALC-SCNC: 3 MMOL/L (ref 0–18)
AST SERPL-CCNC: 19 U/L (ref 15–37)
BASOPHILS # BLD AUTO: 0.01 X10(3) UL (ref 0–0.2)
BASOPHILS NFR BLD AUTO: 0.3 %
BILIRUB SERPL-MCNC: 0.7 MG/DL (ref 0.1–2)
BILIRUB UR QL STRIP.AUTO: NEGATIVE
BUN BLD-MCNC: 15 MG/DL (ref 7–18)
CALCIUM BLD-MCNC: 8.5 MG/DL (ref 8.5–10.1)
CHLORIDE SERPL-SCNC: 112 MMOL/L (ref 98–112)
CK SERPL-CCNC: 40 U/L
CLARITY UR REFRACT.AUTO: CLEAR
CO2 SERPL-SCNC: 26 MMOL/L (ref 21–32)
COLOR UR AUTO: YELLOW
CREAT BLD-MCNC: 1.09 MG/DL
EOSINOPHIL # BLD AUTO: 0.02 X10(3) UL (ref 0–0.7)
EOSINOPHIL NFR BLD AUTO: 0.6 %
ERYTHROCYTE [DISTWIDTH] IN BLOOD BY AUTOMATED COUNT: 14.6 %
GLOBULIN PLAS-MCNC: 3.5 G/DL (ref 2.8–4.4)
GLUCOSE BLD-MCNC: 97 MG/DL (ref 70–99)
GLUCOSE UR STRIP.AUTO-MCNC: NEGATIVE MG/DL
HCT VFR BLD AUTO: 39 %
HGB BLD-MCNC: 12.8 G/DL
HYALINE CASTS #/AREA URNS AUTO: PRESENT /LPF
IMM GRANULOCYTES # BLD AUTO: 0.02 X10(3) UL (ref 0–1)
IMM GRANULOCYTES NFR BLD: 0.6 %
KETONES UR STRIP.AUTO-MCNC: NEGATIVE MG/DL
LACTATE SERPL-SCNC: 0.8 MMOL/L (ref 0.4–2)
LEUKOCYTE ESTERASE UR QL STRIP.AUTO: NEGATIVE
LYMPHOCYTES # BLD AUTO: 0.64 X10(3) UL (ref 1–4)
LYMPHOCYTES NFR BLD AUTO: 17.8 %
MCH RBC QN AUTO: 30.3 PG (ref 26–34)
MCHC RBC AUTO-ENTMCNC: 32.8 G/DL (ref 31–37)
MCV RBC AUTO: 92.4 FL
MONOCYTES # BLD AUTO: 0.47 X10(3) UL (ref 0.1–1)
MONOCYTES NFR BLD AUTO: 13.1 %
NEUTROPHILS # BLD AUTO: 2.44 X10 (3) UL (ref 1.5–7.7)
NEUTROPHILS # BLD AUTO: 2.44 X10(3) UL (ref 1.5–7.7)
NEUTROPHILS NFR BLD AUTO: 67.6 %
NITRITE UR QL STRIP.AUTO: NEGATIVE
NT-PROBNP SERPL-MCNC: 1549 PG/ML (ref ?–125)
OSMOLALITY SERPL CALC.SUM OF ELEC: 293 MOSM/KG (ref 275–295)
PH UR STRIP.AUTO: 5 [PH] (ref 5–8)
PLATELET # BLD AUTO: 92 10(3)UL (ref 150–450)
POTASSIUM SERPL-SCNC: 3.8 MMOL/L (ref 3.5–5.1)
PROCALCITONIN SERPL-MCNC: <0.05 NG/ML (ref ?–0.16)
PROT SERPL-MCNC: 7.1 G/DL (ref 6.4–8.2)
PROT UR STRIP.AUTO-MCNC: NEGATIVE MG/DL
RBC # BLD AUTO: 4.22 X10(6)UL
SARS-COV-2 RNA RESP QL NAA+PROBE: DETECTED
SODIUM SERPL-SCNC: 141 MMOL/L (ref 136–145)
SP GR UR STRIP.AUTO: 1.02 (ref 1–1.03)
TROPONIN I HIGH SENSITIVITY: 11 NG/L
UROBILINOGEN UR STRIP.AUTO-MCNC: <2 MG/DL
WBC # BLD AUTO: 3.6 X10(3) UL (ref 4–11)

## 2022-01-10 PROCEDURE — 99220 INITIAL OBSERVATION CARE,LEVL III: CPT | Performed by: INTERNAL MEDICINE

## 2022-01-10 PROCEDURE — 70450 CT HEAD/BRAIN W/O DYE: CPT | Performed by: EMERGENCY MEDICINE

## 2022-01-10 PROCEDURE — 71045 X-RAY EXAM CHEST 1 VIEW: CPT | Performed by: EMERGENCY MEDICINE

## 2022-01-10 RX ORDER — GUAIFENESIN 600 MG
600 TABLET, EXTENDED RELEASE 12 HR ORAL 2 TIMES DAILY
Status: DISCONTINUED | OUTPATIENT
Start: 2022-01-10 | End: 2022-01-13

## 2022-01-10 RX ORDER — ENOXAPARIN SODIUM 100 MG/ML
40 INJECTION SUBCUTANEOUS DAILY
Status: DISCONTINUED | OUTPATIENT
Start: 2022-01-11 | End: 2022-01-13

## 2022-01-10 RX ORDER — MELATONIN
3 NIGHTLY PRN
Status: DISCONTINUED | OUTPATIENT
Start: 2022-01-10 | End: 2022-01-13

## 2022-01-10 RX ORDER — ASCORBIC ACID 500 MG
1000 TABLET ORAL DAILY
Status: DISCONTINUED | OUTPATIENT
Start: 2022-01-10 | End: 2022-01-13

## 2022-01-10 RX ORDER — CHOLECALCIFEROL (VITAMIN D3) 125 MCG
2000 CAPSULE ORAL DAILY
Status: DISCONTINUED | OUTPATIENT
Start: 2022-01-10 | End: 2022-01-13

## 2022-01-10 RX ORDER — ALBUTEROL SULFATE 90 UG/1
4 AEROSOL, METERED RESPIRATORY (INHALATION) EVERY 4 HOURS PRN
Status: DISCONTINUED | OUTPATIENT
Start: 2022-01-10 | End: 2022-01-13

## 2022-01-10 RX ORDER — ZINC SULFATE 50(220)MG
220 CAPSULE ORAL 2 TIMES DAILY
Status: DISCONTINUED | OUTPATIENT
Start: 2022-01-10 | End: 2022-01-13

## 2022-01-10 RX ORDER — ACETAMINOPHEN 325 MG/1
650 TABLET ORAL EVERY 6 HOURS PRN
Status: DISCONTINUED | OUTPATIENT
Start: 2022-01-10 | End: 2022-01-13

## 2022-01-10 NOTE — ED INITIAL ASSESSMENT (HPI)
Pt to ed with c/o increased confusion x 2 weeks, recent admission at [facility] in [facility]. Hx of pneumonia and dehydration. SOB x 4-5 days, RLQ pain.  Denies n/v/d.

## 2022-01-10 NOTE — ED QUICK NOTES
Pt baseline mental status per daughter is AOx4. Pt still AOx 4 at this time but per daughter is having expressive aphasia and extreme fatigue, falling asleep at kitchen table.

## 2022-01-10 NOTE — H&P
HEMAL HOSPITALIST                                                               History & Physical         Annamary Courser.  Patient Status:  Emergency    10/17/1950 MRN [id number]   Location [facility] Attending Katharine Barrow cholesterol    • HYPERLIPIDEMIA    • Hyperlipidemia    • Nosocomial pneumonia 6/19/2019   • Pneumonia, organism unspecified(486)    • Rheumatoid arthritis ([facility] [facility]    • Shortness of breath    • Stented coronary artery        Past Surgical History:   Procedure EXPERIENCED ANY OF THE FOLLOWING EVENTS  5/13/2014    Procedure: ;  Surgeon: Indu Dietrich MD;  Location: [facility] FOR PAIN MANAGEMENT   • PATIENT [address] FOR IV ANTIBIOTIC SURGICAL SITE INFECTION PROPHYLAXIS.   4/14/2014    Procedur affect.       Diagnostic Data:      Laboratory Data:   Lab Results   Component Value Date    WBC 3.6 01/10/2022    HGB 12.8 01/10/2022    HCT 39.0 01/10/2022    PLT 92.0 01/10/2022    CREATSERUM 1.09 01/10/2022    BUN 15 01/10/2022     01/10/2022    K FINDINGS:     VENTRICLES/SULCI:  Ventricles and sulci are mildly prominent consistent with atrophy.    INTRACRANIAL:  Low-attenuation the periventricular white matter consistent small vessel ischemic disease.  There are no abnormal extraaxial fluid collec dementia  1. Patient takes Aricept at home which we will continue      Quality:  · DVT Prophylaxis: CT of the brain  · CODE status: full  · Rosenthal: no    Plan of care discussed with patient      Discussed with ER Physician.   Tried to reach patient's family

## 2022-01-11 ENCOUNTER — PATIENT OUTREACH (OUTPATIENT)
Dept: CASE MANAGEMENT | Age: 72
End: 2022-01-11

## 2022-01-11 LAB
ALBUMIN SERPL-MCNC: 3.4 G/DL (ref 3.4–5)
ALBUMIN/GLOB SERPL: 1.1 {RATIO} (ref 1–2)
ALP LIVER SERPL-CCNC: 86 U/L
ALT SERPL-CCNC: 30 U/L
ANION GAP SERPL CALC-SCNC: 7 MMOL/L (ref 0–18)
AST SERPL-CCNC: 25 U/L (ref 15–37)
BASOPHILS # BLD AUTO: 0.03 X10(3) UL (ref 0–0.2)
BASOPHILS NFR BLD AUTO: 0.8 %
BILIRUB SERPL-MCNC: 0.8 MG/DL (ref 0.1–2)
BUN BLD-MCNC: 14 MG/DL (ref 7–18)
CALCIUM BLD-MCNC: 8.1 MG/DL (ref 8.5–10.1)
CHLORIDE SERPL-SCNC: 112 MMOL/L (ref 98–112)
CO2 SERPL-SCNC: 23 MMOL/L (ref 21–32)
CREAT BLD-MCNC: 0.98 MG/DL
CRP SERPL-MCNC: 1.47 MG/DL (ref ?–0.3)
D DIMER PPP FEU-MCNC: 1 UG/ML FEU (ref ?–0.72)
DEPRECATED HBV CORE AB SER IA-ACNC: 764 NG/ML
EOSINOPHIL # BLD AUTO: 0.01 X10(3) UL (ref 0–0.7)
EOSINOPHIL NFR BLD AUTO: 0.3 %
ERYTHROCYTE [DISTWIDTH] IN BLOOD BY AUTOMATED COUNT: 14.6 %
GLOBULIN PLAS-MCNC: 3.2 G/DL (ref 2.8–4.4)
GLUCOSE BLD-MCNC: 83 MG/DL (ref 70–99)
HCT VFR BLD AUTO: 45.3 %
HGB BLD-MCNC: 13.2 G/DL
IMM GRANULOCYTES # BLD AUTO: 0.12 X10(3) UL (ref 0–1)
IMM GRANULOCYTES NFR BLD: 3.2 %
LDH SERPL L TO P-CCNC: 245 U/L
LYMPHOCYTES # BLD AUTO: 0.73 X10(3) UL (ref 1–4)
LYMPHOCYTES NFR BLD AUTO: 19.7 %
MCH RBC QN AUTO: 29.9 PG (ref 26–34)
MCHC RBC AUTO-ENTMCNC: 29.1 G/DL (ref 31–37)
MCV RBC AUTO: 102.5 FL
MONOCYTES # BLD AUTO: 0.61 X10(3) UL (ref 0.1–1)
MONOCYTES NFR BLD AUTO: 16.5 %
NEUTROPHILS # BLD AUTO: 2.2 X10 (3) UL (ref 1.5–7.7)
NEUTROPHILS # BLD AUTO: 2.2 X10(3) UL (ref 1.5–7.7)
NEUTROPHILS NFR BLD AUTO: 59.5 %
OSMOLALITY SERPL CALC.SUM OF ELEC: 294 MOSM/KG (ref 275–295)
PLATELET # BLD AUTO: 86 10(3)UL (ref 150–450)
POTASSIUM SERPL-SCNC: 4.2 MMOL/L (ref 3.5–5.1)
PROT SERPL-MCNC: 6.6 G/DL (ref 6.4–8.2)
RBC # BLD AUTO: 4.42 X10(6)UL
SODIUM SERPL-SCNC: 142 MMOL/L (ref 136–145)
WBC # BLD AUTO: 3.7 X10(3) UL (ref 4–11)

## 2022-01-11 PROCEDURE — 99213 OFFICE O/P EST LOW 20 MIN: CPT | Performed by: OTHER

## 2022-01-11 PROCEDURE — 99225 SUBSEQUENT OBSERVATION CARE: CPT | Performed by: HOSPITALIST

## 2022-01-11 RX ORDER — CETIRIZINE HYDROCHLORIDE 10 MG/1
10 TABLET ORAL DAILY
Status: DISCONTINUED | OUTPATIENT
Start: 2022-01-11 | End: 2022-01-13

## 2022-01-11 RX ORDER — CLONAZEPAM 0.5 MG/1
0.25 TABLET ORAL NIGHTLY PRN
Status: DISCONTINUED | OUTPATIENT
Start: 2022-01-11 | End: 2022-01-13

## 2022-01-11 RX ORDER — DONEPEZIL HYDROCHLORIDE 10 MG/1
10 TABLET, FILM COATED ORAL NIGHTLY
Status: DISCONTINUED | OUTPATIENT
Start: 2022-01-11 | End: 2022-01-13

## 2022-01-11 RX ORDER — ATORVASTATIN CALCIUM 20 MG/1
20 TABLET, FILM COATED ORAL NIGHTLY
Refills: 1 | Status: DISCONTINUED | OUTPATIENT
Start: 2022-01-11 | End: 2022-01-13

## 2022-01-11 RX ORDER — ASPIRIN 81 MG/1
81 TABLET ORAL DAILY
Status: DISCONTINUED | OUTPATIENT
Start: 2022-01-11 | End: 2022-01-13

## 2022-01-11 RX ORDER — ISOSORBIDE MONONITRATE 30 MG/1
60 TABLET, EXTENDED RELEASE ORAL DAILY
Status: DISCONTINUED | OUTPATIENT
Start: 2022-01-11 | End: 2022-01-12

## 2022-01-11 RX ORDER — CLOPIDOGREL BISULFATE 75 MG/1
75 TABLET ORAL DAILY
Status: DISCONTINUED | OUTPATIENT
Start: 2022-01-11 | End: 2022-01-13

## 2022-01-11 RX ORDER — METOPROLOL SUCCINATE 50 MG/1
50 TABLET, EXTENDED RELEASE ORAL
Status: DISCONTINUED | OUTPATIENT
Start: 2022-01-11 | End: 2022-01-13

## 2022-01-11 RX ORDER — PANTOPRAZOLE SODIUM 40 MG/1
40 TABLET, DELAYED RELEASE ORAL
Status: DISCONTINUED | OUTPATIENT
Start: 2022-01-12 | End: 2022-01-13

## 2022-01-11 RX ORDER — TERAZOSIN 10 MG/1
10 CAPSULE ORAL NIGHTLY
Refills: 3 | Status: DISCONTINUED | OUTPATIENT
Start: 2022-01-11 | End: 2022-01-13

## 2022-01-11 NOTE — SLP NOTE
SPEECH/LANGUAGE/COGNITIVE EVALUATION - INPATIENT    Admission Date: 1/10/2022  Evaluation Date: 01/11/22    Reason for Referral: Report of word finding deficits for past few weeks.      ASSESSMENT & PLAN   ASSESSMENT & IMPRESSION  The [facility] part in this evaluation and plan of treatment and have been advised and agree on the findings and goals.       FOLLOW UP  Follow Up Needed (Documentation Required): No       Thank you for your referral.  If you have any questions please contact [address] [place] [address]

## 2022-01-11 NOTE — OCCUPATIONAL THERAPY NOTE
OCCUPATIONAL THERAPY EVALUATION - INPATIENT     Room Number: 325/325-A  Evaluation Date: 1/11/2022  Type of Evaluation: Initial  Presenting Problem: fatigue, COVID    Physician Order: IP Consult to Occupational Therapy  Reason for Therapy: ADL/IADL Dysfunc CGA balance for clothing management    Functional Mobility:  Supine to Sit : Contact guard assist  Sit to Stand: Contact guard assist  Toilet/commode transfer: CGA from toilet  Ambulation CGA, no device    Activity tolerance: room air 97% after ambulating off regular upper body clothing?: A Little  -   Taking care of personal grooming such as brushing teeth?: A Little  -   Eating meals?: A Little    AM-PAC Score:  Score: 18  Approx Degree of Impairment: 46.65%  Standardized Score (AM-PAC Scale): 38.66    Ad

## 2022-01-11 NOTE — PROGRESS NOTES
COVID-19 Daily Discharge Readiness-Nursing    O2 Sat at Rest:   91% Room air     O2 Sat with Exertion:    % on    liters   Temperature max from last 24 hrs: Temp (24hrs), Av.9 °F (37.2 °C), Min:98.5 °F (36.9 °C), Max:99.6 °F (37.6 °C)    Inflammatory M

## 2022-01-11 NOTE — CONSULTS
INFECTIOUS DISEASE CONSULTATION    Mannie Coto.  Patient Status:  Observation    10/17/1950 MRN [id number]   [facility] 3NW-A Attending Gretta Cordon MD   Hosp Day # 0 PCP Frank Fletcher Surgeon: Lucero Roa MD;  Location: [facility] FOR PAIN MANAGEMENT   • INJECTION, ANESTHETIC/STEROID, TRANSFORAMINAL EPIDURAL; LUMBAR/SACRAL, SINGLE LEVEL  7/23/2012    Procedure: TRANSFORAMINAL EPIDURAL - LUMBAR;  Surgeon: Lucero Roa MD;  Pooja Corado smoking history. He has never used smokeless tobacco. He reports previous alcohol use of about 1.0 standard drink of alcohol per week. He reports that he does not use drugs.       Allergies:  No Known Allergies    Medications:    Current Facility-Administer Call physician, Disp: 100 tablet, Rfl: 0  Clopidogrel Bisulfate (PLAVIX) 75 MG Oral Tab, Take 75 mg by mouth daily. , Disp: , Rfl:   doxycycline 100 MG Oral Cap, Take 100 mg by mouth daily. , Disp: , Rfl:   PRAVASTATIN SODIUM 80 MG Oral Tab, TAKE 1 TABLET BY COVID-19 Lab Results    COVID-19  Lab Results   Component Value Date    COVID19 Detected (A) 01/10/2022       Pro-Calcitonin  Recent Labs   Lab 01/10/22  2036   PCT <0.05       Cardiac  Recent Labs   Lab 01/10/22  2036   PBNP 1,549*       Creatinine infarction (NSTEMI)     Expressive aphasia     COVID-19     Weakness generalized      ASSESSMENT/PLAN:  1.  COVID-19 infection  Not vaccinated  -previous admission Marilyn was treated for pneumonia, tested negative for COVID 12/27    Admitted for neurologic s

## 2022-01-11 NOTE — SLP NOTE
ADULT SWALLOWING EVALUATION    ASSESSMENT    ASSESSMENT/OVERALL IMPRESSION:  Samuel Vanegas. is a 66year old male admitted with expressive aphasia and fatigue and confusion.   Started about 2 weeks back and was recently admitted at [facility] [place] [facility] Discharge Recommendations/Plan: Undetermined    HISTORY   MEDICAL HISTORY  Reason for Referral: R/O aspiration    Problem List  Principal Problem:    Expressive aphasia  Active Problems:    CAD (coronary artery disease)    Word finding difficulty    Esse presentation;Spoon;Cup;Straw;Consecutive swallows  Patient Positioning: Upright;Midline (in bed)    Oral Phase of Swallow: Within Functional Limits    Pharyngeal Phase of Swallow:  Within Functional Limits  (Please note: Silent aspiration cannot be evaluate

## 2022-01-11 NOTE — PROGRESS NOTES
[facility]     Hospitalist Progress Note     [facility].  Patient Status:  Observation    10/17/1950 MRN [id number]   [facility] 3NW-A Attending Eloisa Meza MD   Hosp Day # 0 PCP Kayla White MD     Chief Complaint: exp Markers  Recent Labs   Lab 01/11/22  0547 01/11/22  0549   CRP  --  1.47*   KIMBERLY  --  764.0*   LDH  --  245*   DDIMER 1.00*  --      Imaging: Imaging data reviewed in Epic.   Medications:   • guaiFENesin ER  600 mg Oral BID   • enoxaparin  40 mg Subcutaneous

## 2022-01-11 NOTE — CONSULTS
[facility]  Report of Consultation    Luis Dwyer.  Patient Status:  Observation    10/17/1950 MRN [id number]   [facility] 3NW-A Attending Juanjose Saleem MD   Hosp Day # 0 PCP Jennifer Churchill MD     Reason for Consultation:  W High cholesterol    • HYPERLIPIDEMIA    • Hyperlipidemia    • Nosocomial pneumonia 6/19/2019   • Pneumonia, organism unspecified(486)    • Rheumatoid arthritis (HCC)    • Shortness of breath    • Stented coronary artery      Past Surgical History:   Proced EXPERIENCED ANY OF THE FOLLOWING EVENTS  5/13/2014    Procedure: ;  Surgeon: Mu Holder MD;  Location: [facility] FOR PAIN MANAGEMENT   • PATIENT [address] FOR IV ANTIBIOTIC SURGICAL SITE INFECTION PROPHYLAXIS.   4/14/2014    Procedur every 8 (eight) hours as needed for Pain., Disp: , Rfl: , Past Month at Unknown time  nitroGLYCERIN 0.4 MG Sublingual SL Tab, Place 1 tablet (0.4 mg total) under the tongue every 5 (five) minutes as needed for Chest pain.  Call physician, Disp: 100 tablet, Q6H PRN  •  melatonin tab 3 mg, 3 mg, Oral, Nightly PRN  •  zinc sulfate (ZINCATE) cap 220 mg, 220 mg, Oral, BID  •  Vitamin D3 (Cholecalciferol) tab 2,000 Units, 2,000 Units, Oral, Daily  •  ascorbic acid (VITAMIN C) tab 1,000 mg, 1,000 mg, Oral, Daily 01/11/2022    ALT 30 01/11/2022    DDIMER 1.00 01/11/2022    CRP 1.47 01/11/2022    CK 40 01/10/2022       Imaging:  CT BRAIN OR HEAD ([id number])    Result Date: 1/10/2022  CONCLUSION:   1. No acute intracranial process.   2. Findings of possible left maxillary

## 2022-01-11 NOTE — PROGRESS NOTES
Pt's wife calling states Julieta Jones is in the hospital with Covid and she is worried for him. He's on regular air and if it wasn't for his confusion they would send him home Batool voiced.       They are giving him Zinc and Vitamin D along with Melatonin she re

## 2022-01-11 NOTE — PROGRESS NOTES
Dr. Amarilis Clifton paged for orders for restraints, patient constantly getting out of bed, bed alarm going off every 5-10 minutes. Waiting for responses.

## 2022-01-11 NOTE — PHYSICAL THERAPY NOTE
PHYSICAL THERAPY EVALUATION - INPATIENT     Room Number: 325/325-A  Evaluation Date: 1/11/2022  Type of Evaluation: Initial  Physician Order: PT Eval and Treat    Presenting Problem: covid 19     Co-Morbidities : Expressive aphasia; vascular dementia; NS 3-5x/week  Number of Visits to Meet Established Goals: 2      CURRENT GOALS    Goal #1 Patient is able to demonstrate supine - sit EOB @ level: modified independent     Goal #2 Patient is able to demonstrate transfers Sit to/from Stand at assistance level: is requiring posey, bed alarm, and wrist restraints to not pull on lines, etc.     RANGE OF MOTION AND STRENGTH ASSESSMENT  Upper extremity ROM and strength = See OT eval.      Lower extremity ROM is within functional limits     Lower extremity strength is supervision  Gait = see above    Therapist's Comments: Rec'd pt in supine. During eval and treat, pt on room air.     VC's for expectations, reassurance, encouragement, safety cues. Mobility as indicated above.      Staff communications: on phone with

## 2022-01-11 NOTE — PLAN OF CARE
COVID-19 Daily Discharge Readiness-Nursing    O2 Sat at Rest:  SPO2% on Room Air at Rest: 97  %   O2 Sat with Exertion:   94 % on  0  liters   Temperature max from last 24 hrs: Temp (24hrs), Av.6 °F (37 °C), Min:98 °F (36.7 °C), Max:99.6 °F (37.6 °C)

## 2022-01-12 LAB
ALBUMIN SERPL-MCNC: 3.4 G/DL (ref 3.4–5)
ALBUMIN/GLOB SERPL: 1.1 {RATIO} (ref 1–2)
ALP LIVER SERPL-CCNC: 90 U/L
ALT SERPL-CCNC: 33 U/L
ANION GAP SERPL CALC-SCNC: 6 MMOL/L (ref 0–18)
AST SERPL-CCNC: 28 U/L (ref 15–37)
BASOPHILS # BLD AUTO: 0.02 X10(3) UL (ref 0–0.2)
BASOPHILS NFR BLD AUTO: 0.5 %
BILIRUB SERPL-MCNC: 1 MG/DL (ref 0.1–2)
BUN BLD-MCNC: 16 MG/DL (ref 7–18)
CALCIUM BLD-MCNC: 8.2 MG/DL (ref 8.5–10.1)
CHLORIDE SERPL-SCNC: 115 MMOL/L (ref 98–112)
CO2 SERPL-SCNC: 21 MMOL/L (ref 21–32)
CREAT BLD-MCNC: 0.95 MG/DL
CRP SERPL-MCNC: 2.04 MG/DL (ref ?–0.3)
D DIMER PPP FEU-MCNC: 0.48 UG/ML FEU (ref ?–0.72)
DEPRECATED HBV CORE AB SER IA-ACNC: 524 NG/ML
EOSINOPHIL # BLD AUTO: 0 X10(3) UL (ref 0–0.7)
EOSINOPHIL NFR BLD AUTO: 0 %
ERYTHROCYTE [DISTWIDTH] IN BLOOD BY AUTOMATED COUNT: 14.9 %
GLOBULIN PLAS-MCNC: 3.2 G/DL (ref 2.8–4.4)
GLUCOSE BLD-MCNC: 82 MG/DL (ref 70–99)
HCT VFR BLD AUTO: 47.5 %
HGB BLD-MCNC: 14.2 G/DL
IMM GRANULOCYTES # BLD AUTO: 0.05 X10(3) UL (ref 0–1)
IMM GRANULOCYTES NFR BLD: 1.3 %
LDH SERPL L TO P-CCNC: 252 U/L
LYMPHOCYTES # BLD AUTO: 0.69 X10(3) UL (ref 1–4)
LYMPHOCYTES NFR BLD AUTO: 17.6 %
MCH RBC QN AUTO: 29.9 PG (ref 26–34)
MCHC RBC AUTO-ENTMCNC: 29.9 G/DL (ref 31–37)
MCV RBC AUTO: 100 FL
MONOCYTES # BLD AUTO: 0.77 X10(3) UL (ref 0.1–1)
MONOCYTES NFR BLD AUTO: 19.6 %
NEUTROPHILS # BLD AUTO: 2.4 X10 (3) UL (ref 1.5–7.7)
NEUTROPHILS # BLD AUTO: 2.4 X10(3) UL (ref 1.5–7.7)
NEUTROPHILS NFR BLD AUTO: 61 %
OSMOLALITY SERPL CALC.SUM OF ELEC: 294 MOSM/KG (ref 275–295)
PLATELET # BLD AUTO: 88 10(3)UL (ref 150–450)
POTASSIUM SERPL-SCNC: 4 MMOL/L (ref 3.5–5.1)
PROT SERPL-MCNC: 6.6 G/DL (ref 6.4–8.2)
RBC # BLD AUTO: 4.75 X10(6)UL
SODIUM SERPL-SCNC: 142 MMOL/L (ref 136–145)
WBC # BLD AUTO: 3.9 X10(3) UL (ref 4–11)

## 2022-01-12 PROCEDURE — 99213 OFFICE O/P EST LOW 20 MIN: CPT | Performed by: OTHER

## 2022-01-12 PROCEDURE — 99225 SUBSEQUENT OBSERVATION CARE: CPT | Performed by: HOSPITALIST

## 2022-01-12 RX ORDER — DIAZEPAM 5 MG/1
5 TABLET ORAL ONCE
Status: COMPLETED | OUTPATIENT
Start: 2022-01-12 | End: 2022-01-13

## 2022-01-12 RX ORDER — ISOSORBIDE MONONITRATE 60 MG/1
120 TABLET, EXTENDED RELEASE ORAL ONCE
Status: COMPLETED | OUTPATIENT
Start: 2022-01-12 | End: 2022-01-12

## 2022-01-12 RX ORDER — ISOSORBIDE MONONITRATE 60 MG/1
180 TABLET, EXTENDED RELEASE ORAL DAILY
Status: DISCONTINUED | OUTPATIENT
Start: 2022-01-13 | End: 2022-01-13

## 2022-01-12 NOTE — PROGRESS NOTES
[facility]     Hospitalist Progress Note     Tigist Martins.  Patient Status:  Observation    10/17/1950 MRN [id number]   [facility] [place] 3NW-A Attending Anna Rogers MD   Hosp Day # 0 PCP Kia Wall MD     Chief Complaint: exp Inflammatory Markers  Recent Labs   Lab 01/11/22  0547 01/11/22  0549 01/12/22  0556   CRP  --  1.47* 2.04*   KIMBERLY  --  764.0* 524.0   LDH  --  245* 252*   DDIMER 1.00*  --  0.48     Imaging: Imaging data reviewed in Epic.   Medications:   • aspirin  81

## 2022-01-12 NOTE — PROGRESS NOTES
[facility]                INFECTIOUS DISEASE PROGRESS NOTE    Alejandra Henderson.  Patient Status:  Observation    10/17/1950 MRN [id number]   [facility] 3NW-A Attending Mariajose Hoyt MD   Hosp Day # 0 PCP Izaiah Smith MD 01/10/22 12:54 PM    Specimen: Blood,peripheral   Result Value Ref Range    Blood Culture Result No Growth 1 Day N/A         Problem list reviewed:  Patient Active Problem List:     Elevated PSA / Biopsy negative x 2 - [place]     Obstructive Sleep Apnea / Sl as needed      Charlee Gtz MD, MD  [facility]  [phone]

## 2022-01-12 NOTE — PROGRESS NOTES
[id number] [facility] Progress Note    Sunitha France.  Patient Status:  Observation    10/17/1950 MRN [id number]   [facility] 3NW-A Attending Duwaine Buerger, MD   Hosp Day # 0 PCP Rose Johnson MD     Subjective:  Melissa Kaminski Component Value Date    WBC 3.9 01/12/2022    HGB 14.2 01/12/2022    HCT 47.5 01/12/2022    PLT 88.0 01/12/2022    CREATSERUM 0.95 01/12/2022    BUN 16 01/12/2022     01/12/2022    K 4.0 01/12/2022     01/12/2022    CO2 21.0 01/12/2022    GLU 01/12/2022    AST 25 01/11/2022    AST 19 01/10/2022     Lab Results   Component Value Date    ALT 33 01/12/2022    ALT 30 01/11/2022    ALT 31 01/10/2022       Imaging:   No new imaging    Assessment:      COVID-19 infection  Confusion and word finding di

## 2022-01-12 NOTE — PLAN OF CARE
COVID-19 Daily Discharge Readiness-Nursing    O2 Sat at Rest:  SPO2% on Room Air at Rest: 97  %  Temperature max from last 24 hrs: Temp (24hrs), Av.3 °F (36.8 °C), Min:98 °F (36.7 °C), Max:98.8 °F (37.1 °C)    Inflammatory Markers: Recent Labs   Lab 01 as soon as possible  - Assess the patient's physical comfort, circulation, skin condition, hydration, nutrition and elimination needs   - Reorient and redirection as needed  - Assess for the need to continue restraints  Outcome: Progressing

## 2022-01-12 NOTE — PLAN OF CARE
COVID-19 Daily Discharge Readiness-Nursing    O2 Sat at Rest:  96  O2 Sat with Exertion:    93% on    liters   Temperature max from last 24 hrs: Temp (24hrs), Av.1 °F (36.7 °C), Min:97.8 °F (36.6 °C), Max:98.2 °F (36.8 °C)    Inflammatory Markers: Rece

## 2022-01-13 ENCOUNTER — APPOINTMENT (OUTPATIENT)
Dept: MRI IMAGING | Facility: HOSPITAL | Age: 72
End: 2022-01-13
Attending: HOSPITALIST
Payer: MEDICARE

## 2022-01-13 VITALS
RESPIRATION RATE: 18 BRPM | BODY MASS INDEX: 30.92 KG/M2 | DIASTOLIC BLOOD PRESSURE: 64 MMHG | HEIGHT: 66 IN | SYSTOLIC BLOOD PRESSURE: 138 MMHG | TEMPERATURE: 98 F | WEIGHT: 192.38 LBS | OXYGEN SATURATION: 91 % | HEART RATE: 83 BPM

## 2022-01-13 LAB
ALBUMIN SERPL-MCNC: 3.5 G/DL (ref 3.4–5)
ALBUMIN/GLOB SERPL: 1.1 {RATIO} (ref 1–2)
ALP LIVER SERPL-CCNC: 97 U/L
ALT SERPL-CCNC: 39 U/L
ANION GAP SERPL CALC-SCNC: 7 MMOL/L (ref 0–18)
AST SERPL-CCNC: 45 U/L (ref 15–37)
BASOPHILS # BLD AUTO: 0.01 X10(3) UL (ref 0–0.2)
BASOPHILS NFR BLD AUTO: 0.3 %
BILIRUB SERPL-MCNC: 1.1 MG/DL (ref 0.1–2)
BUN BLD-MCNC: 22 MG/DL (ref 7–18)
CALCIUM BLD-MCNC: 8 MG/DL (ref 8.5–10.1)
CHLORIDE SERPL-SCNC: 114 MMOL/L (ref 98–112)
CO2 SERPL-SCNC: 21 MMOL/L (ref 21–32)
CREAT BLD-MCNC: 1.13 MG/DL
CRP SERPL-MCNC: 2.85 MG/DL (ref ?–0.3)
D DIMER PPP FEU-MCNC: 0.47 UG/ML FEU (ref ?–0.72)
DEPRECATED HBV CORE AB SER IA-ACNC: 674.8 NG/ML
EOSINOPHIL # BLD AUTO: 0 X10(3) UL (ref 0–0.7)
EOSINOPHIL NFR BLD AUTO: 0 %
ERYTHROCYTE [DISTWIDTH] IN BLOOD BY AUTOMATED COUNT: 14.9 %
FOLATE SERPL-MCNC: 10.8 NG/ML (ref 8.7–?)
GLOBULIN PLAS-MCNC: 3.3 G/DL (ref 2.8–4.4)
GLUCOSE BLD-MCNC: 90 MG/DL (ref 70–99)
HCT VFR BLD AUTO: 41.6 %
HGB BLD-MCNC: 13.7 G/DL
IMM GRANULOCYTES # BLD AUTO: 0.02 X10(3) UL (ref 0–1)
IMM GRANULOCYTES NFR BLD: 0.6 %
LDH SERPL L TO P-CCNC: 300 U/L
LYMPHOCYTES # BLD AUTO: 0.76 X10(3) UL (ref 1–4)
LYMPHOCYTES NFR BLD AUTO: 21.2 %
MCH RBC QN AUTO: 30.4 PG (ref 26–34)
MCHC RBC AUTO-ENTMCNC: 32.9 G/DL (ref 31–37)
MCV RBC AUTO: 92.2 FL
MONOCYTES # BLD AUTO: 0.74 X10(3) UL (ref 0.1–1)
MONOCYTES NFR BLD AUTO: 20.7 %
NEUTROPHILS # BLD AUTO: 2.05 X10 (3) UL (ref 1.5–7.7)
NEUTROPHILS # BLD AUTO: 2.05 X10(3) UL (ref 1.5–7.7)
NEUTROPHILS NFR BLD AUTO: 57.2 %
OSMOLALITY SERPL CALC.SUM OF ELEC: 297 MOSM/KG (ref 275–295)
PLATELET # BLD AUTO: 78 10(3)UL (ref 150–450)
POTASSIUM SERPL-SCNC: 3.9 MMOL/L (ref 3.5–5.1)
PROT SERPL-MCNC: 6.8 G/DL (ref 6.4–8.2)
RBC # BLD AUTO: 4.51 X10(6)UL
SODIUM SERPL-SCNC: 142 MMOL/L (ref 136–145)
WBC # BLD AUTO: 3.6 X10(3) UL (ref 4–11)

## 2022-01-13 PROCEDURE — 99204 OFFICE O/P NEW MOD 45 MIN: CPT | Performed by: SPECIALIST

## 2022-01-13 PROCEDURE — 70544 MR ANGIOGRAPHY HEAD W/O DYE: CPT | Performed by: HOSPITALIST

## 2022-01-13 PROCEDURE — 70551 MRI BRAIN STEM W/O DYE: CPT | Performed by: HOSPITALIST

## 2022-01-13 PROCEDURE — 99217 OBSERVATION CARE DISCHARGE: CPT | Performed by: HOSPITALIST

## 2022-01-13 PROCEDURE — 99213 OFFICE O/P EST LOW 20 MIN: CPT | Performed by: OTHER

## 2022-01-13 RX ORDER — LORAZEPAM 2 MG/ML
1 INJECTION INTRAMUSCULAR ONCE
Status: COMPLETED | OUTPATIENT
Start: 2022-01-13 | End: 2022-01-13

## 2022-01-13 RX ORDER — LORAZEPAM 2 MG/ML
INJECTION INTRAMUSCULAR
Status: DISCONTINUED
Start: 2022-01-13 | End: 2022-01-13

## 2022-01-13 NOTE — PROGRESS NOTES
[facility]     Hospitalist Progress Note     Niall Soto.  Patient Status:  Observation    10/17/1950 MRN [id number]   [facility] 3NW-A Attending Priti Odonnell MD   Hosp Day # 0 PCP Niurka Venegas MD     Chief Complaint: exp 01/10/22  2036   PBNP 1,549*     Creatinine Kinase  Recent Labs   Lab 01/10/22  2036   CK 40     Inflammatory Markers  Recent Labs   Lab 01/11/22  0547 01/11/22  0549 01/12/22  0556 01/13/22  0651   CRP  --  1.47* 2.04* 2.85*   KIMBERLY  --  764.0* 524.0 674.8*

## 2022-01-13 NOTE — CONSULTS
Hem/Onc Report of Consultation    Patient Name: Tigist Martins.    YOB: 1950   Medical Record Number: [id number]    Referring Provider(s): Dr. Jocelyn Patterson     Date of Consultation: 1/13/2022     Reason for Consultation: acute thrombocytopenia • CATARACT     • CATH BARE METAL STENT (BMS)     • COLONOSCOPY     • COLONOSCOPY     • HERNIA SURGERY  2005    multiple hernias   • INJECTION, ANESTHETIC/STEROID, TRANSFORAMINAL EPIDURAL; LUMBAR/SACRAL, SINGLE LEVEL  6/29/2012    Procedure: Kalen Esposito 5/13/2014    Procedure: ;  Surgeon: Indu Dietrich MD;  Location: [facility] FOR PAIN MANAG[facility]       Family Medical History:  Family History   Problem Relation Age of Onset   • Heart Disorder Mother    • Heart Disorder Brother        Psychosocial Histor (ATIVAN) injection 1 mg, 1 mg, Intravenous, Once  LORazepam (ATIVAN) 2 MG/ML injection, , ,   isosorbide mononitrate ER (IMDUR) 24 hr tab 180 mg, 180 mg, Oral, Daily  [COMPLETED] isosorbide mononitrate ER (IMDUR) 24 hr tab 120 mg, 120 mg, Oral, Once  [COMP Tab, Take 81 mg by mouth daily. Metoprolol Succinate ER 50 MG Oral Tablet 24 Hr, Take 1 tablet (50 mg total) by mouth 2x Daily(Beta Blocker). traMADol HCl 50 MG Oral Tab, Take 50 mg by mouth every 8 (eight) hours as needed for Pain.   nitroGLYCERIN 0.4 MG Anion Gap 7 0 - 18 mmol/L    BUN 22 (H) 7 - 18 mg/dL    Creatinine 1.13 0.70 - 1.30 mg/dL    Calcium, Total 8.0 (L) 8.5 - 10.1 mg/dL    Calculated Osmolality 297 (H) 275 - 295 mOsm/kg    GFR, Non- 65 >=60    GFR, -American 75 >=60 status and inability to remain still. The majority of the exam is motion compromised. Consider repeating the exam under anesthesia.   2. No acute intracranial abnormality identified within the limitations of the exam.  3. There are at least mild chronic m

## 2022-01-13 NOTE — CONSULTS
Hem/Onc Report of Consultation    Patient Name: Mannie Coto.    YOB: 1950   Medical Record Number: [id number]   CSN: [id number]   Referring Provider(s): Dr. Loretta Montoya   Date of Consultation: 1/13/2022     Reason for Consultation: acute th Posterior   • CABG      1988, 1994   • CATARACT     • CATH BARE METAL STENT (BMS)     • COLONOSCOPY     • COLONOSCOPY     • HERNIA SURGERY  2005    multiple hernias   • INJECTION, ANESTHETIC/STEROID, TRANSFORAMINAL EPIDURAL; LUMBAR/SACRAL, SINGLE LEVEL  6/ SITE INFECTION PROPHYLAXIS.  5/13/2014    Procedure: ;  Surgeon: Mu Holder MD;  Location: [facility] FOR PAIN MANAGEMENT       Family Medical History:  Family History   Problem Relation Age of Onset   • Heart Disorder Mother    • Heart Disorder Broth Medications:  [COMPLETED] LORazepam (ATIVAN) injection 1 mg, 1 mg, Intravenous, Once  LORazepam (ATIVAN) 2 MG/ML injection, , ,   isosorbide mononitrate ER (IMDUR) 24 hr tab 180 mg, 180 mg, Oral, Daily  [COMPLETED] isosorbide mononitrate ER (IMDUR) 24 hr t mouth daily. aspirin 81 MG Oral Tab, Take 81 mg by mouth daily. Metoprolol Succinate ER 50 MG Oral Tablet 24 Hr, Take 1 tablet (50 mg total) by mouth 2x Daily(Beta Blocker).   traMADol HCl 50 MG Oral Tab, Take 50 mg by mouth every 8 (eight) hours as neede (from the past 24 hour(s))   Comp Metabolic Panel (14)    Collection Time: 01/13/22  6:51 AM   Result Value Ref Range    Glucose 90 70 - 99 mg/dL    Sodium 142 136 - 145 mmol/L    Potassium 3.9 3.5 - 5.1 mmol/L    Chloride 114 (H) 98 - 112 mmol/L    CO2 21 Neutrophil % 57.2 %    Lymphocyte % 21.2 %    Monocyte % 20.7 %    Eosinophil % 0.0 %    Basophil % 0.3 %    Immature Granulocyte % 0.6 %     CBC:    Lab Results   Component Value Date    WBC 3.6 (L) 01/13/2022    WBC 3.9 (L) 01/12/2022    WBC 3.7 (L) 01/1

## 2022-01-13 NOTE — PROGRESS NOTES
[facility]                INFECTIOUS DISEASE PROGRESS NOTE    Annamary Courser.  Patient Status:  Observation    10/17/1950 MRN [id number]   [facility] 3NW-A Attending Khalida Miranda MD   Hosp Day # 0 PCP Alice Larkin MD 01/10/22   1.  Blood Culture     Status: None (Preliminary result)    Collection Time: 01/10/22 12:54 PM    Specimen: Blood,peripheral   Result Value Ref Range    Blood Culture Result No Growth 3 Days N/A           Problem list reviewed:  Patient Active Pro consolidation    Continue to manage expectently    2.  Encephalopathy  Neuro workup noted      Lakeisha Man MD, MD  [facility]  [phone]

## 2022-01-13 NOTE — OCCUPATIONAL THERAPY NOTE
OCCUPATIONAL THERAPY TREATMENT NOTE - INPATIENT     Room Number: 325/325-A  Session: 1   Number of Visits to Meet Established Goals: 3    History: Patient is a 66year old male admitted on 1/10/2022 with Presenting Problem: fatigue, COVID.  Pt was admitted safety cues  Toilet/commode transfer: CGA with safety cues  Ambulation CGA via RW    Activity tolerance:                          Education provided: Role of OT, Safety with ADL and transfers, Overview of identified deficits, Activity recommendations, disc progress  Patient will perform toileting: with supervision --> in progress    Functional Transfer Goals  Patient will transfer to toilet:  with supervision --> in progress  Writer PPE: Surgical mask, N95, gown, goggles, and gloves worn.      Patient/family

## 2022-01-13 NOTE — PHYSICAL THERAPY NOTE
PHYSICAL THERAPY TREATMENT NOTE - INPATIENT    Room Number: 325/325-A     Session: 1     Number of Visits to Meet Established Goals: 2      History related to current admission: Patient is a 66year old male admitted on 1/10/2022 from home for weakness, training;Balance training  Rehab Potential : Good  Frequency (Obs): 3-5x/week    CURRENT GOALS     Goal #1 Patient is able to demonstrate supine - sit EOB @ level: modified independent      Goal #2 Patient is able to demonstrate transfers Sit to/from Stand Little   Help from Another: Climbing 3-5 steps with a railing?: A Little       AM-PAC Score:  Raw Score: 19   Approx Degree of Impairment: 41.77%   Standardized Score (AM-PAC Scale): 45.44   CMS Modifier (G-Code): CK    FUNCTIONAL ABILITY STATUS  Gait Asse

## 2022-01-13 NOTE — PROGRESS NOTES
[address] Neurology Progress Note    Tigist Martins. Patient Status:  Observation    10/17/1950 MRN [id number]   [facility] 3NW-A Attending Anna Rogers MD   Hosp Day # 0 PCP Kia Wall MD     CC:  AMS    Subject 112/60 (BP Location: Left arm)   Pulse 88   Temp 98.8 °F (37.1 °C) (Oral)   Resp 18   Ht 66\"   Wt 192 lb 6.4 oz (87.3 kg)   SpO2 90%   BMI 31.05 kg/m²   GENERAL:  Patient is a 66year old male in no acute distress.   HEENT:  Normocephalic, atraumatic  ABD: well.      Shay Stevens, [address]  1/13/2022, 9:14 AM  [place]    Neurology Attending Addendum:  I have seen independently, reviewed history, labs and imaging independent of PA and agree with above note with following additi of the exam is motion compromised. Consider repeating the exam under anesthesia.   2. No acute intracranial abnormality identified within the limitations of the exam.  3. There are at least mild chronic microvascular ischemic changes in the cerebral white 11/06/2020    HDL 44 09/29/2020     Lab Results   Component Value Date    TRIG 66 12/02/2021    TRIG 87 11/06/2020    TRIG 96 09/29/2020    TRIGLY 140 06/17/2014     Lab Results   Component Value Date    LDL 59 12/02/2021    LDL 76 09/29/2020    LDL 59 06/

## 2022-01-13 NOTE — PLAN OF CARE
COVID-19 Daily Discharge Readiness-Nursing    O2 Sat at Rest:  SPO2% on Room Air at Rest: 97  %   Temperature max from last 24 hrs: Temp (24hrs), Av.2 °F (36.8 °C), Min:97.8 °F (36.6 °C), Max:98.5 °F (36.9 °C)    Inflammatory Markers: Recent Labs   Lab physical comfort, circulation, skin condition, hydration, nutrition and elimination needs   - Reorient and redirection as needed  - Assess for the need to continue restraints  Outcome: Progressing

## 2022-01-14 NOTE — ED PROVIDER NOTES
Patient Seen in: [facility] 3nw-a      History   Patient presents with:  Weakness  Fatigue    Stated Complaint: multiple complaints- increased confusion over last two weeks (baseline confused*    Subjective:   NADER Marina is a 66 ye ANESTHETIC/STEROID, TRANSFORAMINAL EPIDURAL; LUMBAR/SACRAL, SINGLE LEVEL  6/29/2012    Procedure: TRANSFORAMINAL EPIDURAL - LUMBAR;  Surgeon: Indu Dietrich MD;  Location: [address] [place] [address]   • INJECTION, ANESTHETIC/STEROID, TRANSFORAMINAL EP Smoking status: Former Smoker        Packs/day: 0.50        Years: 10.00        Pack years: 5        Quit date: 6/15/1980        Years since quittin.6      Smokeless tobacco: Never Used    Vaping Use      Vaping Use: Never used    Alcohol use: Not Cur carotid bruit  CV: Regular rate and rhythm no murmur rub  Respiratory: Clear to auscultation bilaterally no crackles no wheezes no accessory muscle use  Abdomen: Soft nontender nondistended, no rebound no guarding  no hepatosplenomegaly bowel sounds are pr C-Reactive Protein 2.04 (*)     All other components within normal limits   COMP METABOLIC PANEL (14) - Abnormal; Notable for the following components:    Chloride 114 (*)     BUN 22 (*)     Calcium, Total 8.0 (*)     Calculated Osmolality 297 (*)     AST Normal   FOLIC ACID SERUM(FOLATE) - Normal   CBC WITH DIFFERENTIAL WITH PLATELET    Narrative: The following orders were created for panel order CBC With Differential With Platelet.   Procedure                               Abnormality         Status BLOOD CULTURE   SPUTUM CULTURE          Patient was evaluated in the emergency department had baseline lab work and will have a head CT also. He was positive for COVID on a rapid test. The patient states he had tested negative numerous times.  Denies any Due to patient having expressive aphasia patient will be admitted for further work-up as I do feel he is deconditioned again. Also the fact that he is Covid do feel he is frail and may need some steroids versus remdesivir.  Did discuss case with the hos posterolateral    Selective Coronary Angiography Findings [4/4/18]  1. The right coronary artery is 100% occluded in the proximal segment  2. The saphenous vein graft to the right coronary artery is patent. There is excellent flow down the PDA.   There i

## 2022-01-14 NOTE — PLAN OF CARE
Patient is assessed and ready for DC home  All instructions given to patient for home; explained to wife over the phone.  Verbalized understanding  of DC plan  All questions answered to patients level of satisfaction  PIV removed and intact        NURSING D

## 2022-01-14 NOTE — CM/SW NOTE
1/14 124pm: CHAYO spoke with Antonette Edwardss from [employer] who is working with the pt's dtr. Pippa Elliott and Sanjeev moy to get the pt. Admitted to [employer] today. Antonette Hemphill is working with Moose moy on waiver documentation to get the pt. Admitted to rehab.

## 2022-01-14 NOTE — CM/SW NOTE
Patient has failed home discharge plan, will benefit from direct admission to skilled rehab vs. Readmission due to [place] pandemic. Discharging MD aware.

## 2022-01-14 NOTE — DISCHARGE SUMMARY
HEMAL HOSPITALIST  DISCHARGE SUMMARY     Jason Apryl.  Patient Status:  Observation    10/17/1950 MRN [id number]   [facility] 3NW-A Attending No att. providers found   Hosp Day # 0 PCP Danielito Chapman MD     Date of Admission: 1/10 blood pressure 94/57 and was given IV fluids and blood pressure improved with this. Also found to have low-grade fever 99.6 °F while in emergency room. Patient on room air. Covid tested positive while in the emergency room and is being admitted.   Deven known as: TESSALON      Take 100 mg by mouth 3 (three) times daily as needed. Refills: 0     cetirizine 10 MG Tabs  Commonly known as: ZYRTEC      Take 10 mg by mouth daily.    Refills: 0     clonazePAM 1 MG Tabs  Commonly known as: KLONOPIN      Take 1 t MD [place] [id number]  [phone]    Schedule an appointment as soon as possible for a visit in 1 week  Hospitalization follow up      --------------------------------------------------------------------------------------

## 2022-01-17 ENCOUNTER — TELEPHONE (OUTPATIENT)
Dept: INTERNAL MEDICINE CLINIC | Facility: CLINIC | Age: 72
End: 2022-01-17

## 2022-01-17 ENCOUNTER — PATIENT OUTREACH (OUTPATIENT)
Dept: CASE MANAGEMENT | Age: 72
End: 2022-01-17

## 2022-01-17 DIAGNOSIS — Z02.9 ENCOUNTERS FOR UNSPECIFIED ADMINISTRATIVE PURPOSE: ICD-10-CM

## 2022-01-17 NOTE — PROGRESS NOTES
Severiano Renae's call states she was giving update on pt Raf Winslow is now home recovering. They are currently in the process of finding a rehab facility he can go to because he would be cared for better then being home. They are working on this currently.

## 2022-01-17 NOTE — TELEPHONE ENCOUNTER
Spoke to pt's daughter for TCM today. Patient is no needing total assistance in ADL's from daughter since getting home. Pt does not have HFU appt scheduled at this time. TCM/HFU appt recommended by 1/20/22 as pt is a high risk for readmission.   Please a

## 2022-01-17 NOTE — TELEPHONE ENCOUNTER
Received Delayed start of care form from [address] [place] [address] starte of care 1/8/22. Put in Brigitte's bin for review. Signature needed from . Fax to [phone].  - .

## 2022-01-17 NOTE — PROGRESS NOTES
Initial Post Discharge Follow Up   Discharge Date: 1/13/22  Contact Date: 1/17/2022    Consent Verification:  Assessment Completed With: Other: Daughter, Kory Coffman received per patient?  written  HIPAA Verified?   Yes    Discharge Dx:     Marito Sorto daily as needed. • CLONAZEPAM 1 MG Oral Tab Take 1 tablet(1 mg total) by mouth nightly as needed for Anxiety. 90 tablet 0   • doxazosin 8 MG Oral Tab Take 1 tablet (8 mg total) by mouth nightly.  90 tablet 3   • pantoprazole 40 MG Oral Tab EC Take 1 tab that you are home, are there any needs or concerns you need addressed before your next visit with your PCP?  (DME, meds, disease concerns, Etc): Yes, see below.      Follow up appointments:        PCP TCM/HFU appointment: scheduled at D/C within 7-14 days need any [facility] orders. They have patient scheduled for upcoming visits with PT and OT. Asked [facility] about including a social workers as they would benefit from additional resources and possible SNF placement.       Called daughter, Jose Alejandro Tavares back and spoke wit

## 2022-01-18 ENCOUNTER — TELEPHONE (OUTPATIENT)
Dept: INTERNAL MEDICINE CLINIC | Facility: CLINIC | Age: 72
End: 2022-01-18

## 2022-01-18 NOTE — TELEPHONE ENCOUNTER
Paperwork order from 12/28/21 was received from [facility]. This was placed on [facility] desk for review and signature.

## 2022-01-19 ENCOUNTER — TELEPHONE (OUTPATIENT)
Dept: INTERNAL MEDICINE CLINIC | Facility: CLINIC | Age: 72
End: 2022-01-19

## 2022-01-19 NOTE — TELEPHONE ENCOUNTER
I have not seen him in a year, may be helpful to f/u with neuro but sound like he needs rpt assessment here and likely help with placement. Can see partners, has seen mallorie and [place] more recently than me.

## 2022-01-19 NOTE — TELEPHONE ENCOUNTER
Spoke to pt daughter. Informed her that [address] would like to see pt for visit, and it can be virtual. Lewis agreeable to VV. VV scheduled. Daughter said she will also attend. Routing to [address] to update. Scheduled for 40 min as I was unsure how long you want. Can change to 20 if you would like.

## 2022-01-19 NOTE — TELEPHONE ENCOUNTER
Daughter [place] calling pt not able to care for himself and needs either Rehab facility or full time aid to come to home all day. Daughter desperate for help and will bring in pt for appt if necessary.   Please advise

## 2022-01-20 ENCOUNTER — PATIENT OUTREACH (OUTPATIENT)
Dept: CASE MANAGEMENT | Age: 72
End: 2022-01-20

## 2022-01-20 ENCOUNTER — TELEPHONE (OUTPATIENT)
Dept: INTERNAL MEDICINE CLINIC | Facility: CLINIC | Age: 72
End: 2022-01-20

## 2022-01-20 ENCOUNTER — TELEPHONE (OUTPATIENT)
Dept: NEUROLOGY | Age: 72
End: 2022-01-20

## 2022-01-20 DIAGNOSIS — F01.50 VASCULAR DEMENTIA WITHOUT BEHAVIORAL DISTURBANCE (HCC): ICD-10-CM

## 2022-01-20 DIAGNOSIS — R53.1 WEAKNESS GENERALIZED: ICD-10-CM

## 2022-01-20 DIAGNOSIS — D69.6 THROMBOCYTOPENIA (HCC): ICD-10-CM

## 2022-01-20 DIAGNOSIS — I25.2 H/O NON-ST ELEVATION MYOCARDIAL INFARCTION (NSTEMI): Primary | ICD-10-CM

## 2022-01-20 DIAGNOSIS — R47.01 EXPRESSIVE APHASIA: ICD-10-CM

## 2022-01-20 DIAGNOSIS — U09.9 POST-COVID CHRONIC DECREASED MOBILITY AND ENDURANCE: ICD-10-CM

## 2022-01-20 DIAGNOSIS — R26.81 GAIT INSTABILITY: ICD-10-CM

## 2022-01-20 DIAGNOSIS — Z74.09 POST-COVID CHRONIC DECREASED MOBILITY AND ENDURANCE: ICD-10-CM

## 2022-01-20 PROBLEM — H43.811 POSTERIOR VITREOUS DETACHMENT OF RIGHT EYE: Status: ACTIVE | Noted: 2022-01-20

## 2022-01-20 PROBLEM — J18.9 PNEUMONIA OF LEFT LOWER LOBE DUE TO INFECTIOUS ORGANISM: Status: ACTIVE | Noted: 2022-01-20

## 2022-01-20 PROBLEM — H43.812 POSTERIOR VITREOUS DETACHMENT OF LEFT EYE: Status: ACTIVE | Noted: 2022-01-20

## 2022-01-20 PROBLEM — H43.10 VITREOUS HEMORRHAGE (CMD): Status: ACTIVE | Noted: 2022-01-20

## 2022-01-20 PROBLEM — Z96.1 PRESENCE OF INTRAOCULAR LENS: Status: ACTIVE | Noted: 2022-01-20

## 2022-01-20 PROBLEM — N40.0 BPH (BENIGN PROSTATIC HYPERPLASIA): Status: ACTIVE | Noted: 2021-12-27

## 2022-01-20 PROBLEM — H43.393 VITREOUS OPACITIES OF BOTH EYES: Status: ACTIVE | Noted: 2022-01-20

## 2022-01-20 PROBLEM — U07.1 COVID-19: Status: ACTIVE | Noted: 2022-01-10

## 2022-01-20 PROBLEM — N18.2 CKD (CHRONIC KIDNEY DISEASE) STAGE 2, GFR 60-89 ML/MIN: Status: ACTIVE | Noted: 2021-12-27

## 2022-01-20 PROBLEM — H33.312 HORSESHOE TEAR OF RETINA OF LEFT EYE WITHOUT DETACHMENT: Status: ACTIVE | Noted: 2022-01-20

## 2022-01-20 NOTE — TELEPHONE ENCOUNTER
Pt spouse calling to ask if pt is supposed to be taking below medication still, please advise?      doxycycline 100 MG Oral Cap   8/26/2021    Sig:   Take 100 mg by mouth daily.      Route:   Oral     Class:   Historical     Order #: [id number]

## 2022-01-20 NOTE — PROGRESS NOTES
Severiano Renae's call states she thought pt was on his way to getting better and he was doing ok after discharge but the last few days he has been talking with no sense. Seems he can be fine one moment then talking with no sense another.   Example: pt say

## 2022-01-20 NOTE — TELEPHONE ENCOUNTER
Paperwork was received from [facility]. It was placed on ML desk for review and signature.

## 2022-01-21 ENCOUNTER — APPOINTMENT (OUTPATIENT)
Dept: CT IMAGING | Facility: HOSPITAL | Age: 72
End: 2022-01-21
Attending: STUDENT IN AN ORGANIZED HEALTH CARE EDUCATION/TRAINING PROGRAM
Payer: MEDICARE

## 2022-01-21 ENCOUNTER — APPOINTMENT (OUTPATIENT)
Dept: GENERAL RADIOLOGY | Facility: HOSPITAL | Age: 72
End: 2022-01-21
Attending: STUDENT IN AN ORGANIZED HEALTH CARE EDUCATION/TRAINING PROGRAM
Payer: MEDICARE

## 2022-01-21 ENCOUNTER — TELEPHONE (OUTPATIENT)
Dept: NEUROLOGY | Age: 72
End: 2022-01-21

## 2022-01-21 ENCOUNTER — HOSPITAL ENCOUNTER (EMERGENCY)
Facility: HOSPITAL | Age: 72
Discharge: HOME | End: 2022-01-22
Attending: STUDENT IN AN ORGANIZED HEALTH CARE EDUCATION/TRAINING PROGRAM
Payer: MEDICARE

## 2022-01-21 ENCOUNTER — TELEMEDICINE (OUTPATIENT)
Dept: INTERNAL MEDICINE CLINIC | Facility: CLINIC | Age: 72
End: 2022-01-21
Payer: MEDICARE

## 2022-01-21 ENCOUNTER — TELEPHONE (OUTPATIENT)
Dept: INTERNAL MEDICINE CLINIC | Facility: CLINIC | Age: 72
End: 2022-01-21

## 2022-01-21 ENCOUNTER — LAB ENCOUNTER (OUTPATIENT)
Dept: LAB | Facility: HOSPITAL | Age: 72
End: 2022-01-21
Attending: FAMILY MEDICINE
Payer: MEDICARE

## 2022-01-21 ENCOUNTER — HOSPITAL ENCOUNTER (OUTPATIENT)
Dept: GENERAL RADIOLOGY | Facility: HOSPITAL | Age: 72
Discharge: HOME | End: 2022-01-21
Attending: FAMILY MEDICINE
Payer: MEDICARE

## 2022-01-21 DIAGNOSIS — U07.1 COVID-19: ICD-10-CM

## 2022-01-21 DIAGNOSIS — G93.40 ENCEPHALOPATHY: Primary | ICD-10-CM

## 2022-01-21 DIAGNOSIS — I21.4 NSTEMI (NON-ST ELEVATED MYOCARDIAL INFARCTION) (HCC): ICD-10-CM

## 2022-01-21 DIAGNOSIS — U07.1 COVID-19 VIRUS INFECTION: Primary | ICD-10-CM

## 2022-01-21 DIAGNOSIS — R53.1 WEAKNESS GENERALIZED: ICD-10-CM

## 2022-01-21 DIAGNOSIS — F03.91 DEMENTIA WITH BEHAVIORAL DISTURBANCE, UNSPECIFIED DEMENTIA TYPE (HCC): ICD-10-CM

## 2022-01-21 DIAGNOSIS — G93.40 ENCEPHALOPATHY: ICD-10-CM

## 2022-01-21 DIAGNOSIS — Z87.01 HISTORY OF RECENT PNEUMONIA: ICD-10-CM

## 2022-01-21 DIAGNOSIS — D69.6 THROMBOCYTOPENIA (HCC): ICD-10-CM

## 2022-01-21 DIAGNOSIS — Z78.9 UNABLE TO CARE FOR SELF: ICD-10-CM

## 2022-01-21 PROBLEM — I10 PRIMARY HYPERTENSION: Status: ACTIVE | Noted: 2019-02-26

## 2022-01-21 LAB
ALBUMIN SERPL-MCNC: 3.2 G/DL (ref 3.4–5)
ALBUMIN SERPL-MCNC: 3.5 G/DL (ref 3.4–5)
ALBUMIN/GLOB SERPL: 0.8 {RATIO} (ref 1–2)
ALBUMIN/GLOB SERPL: 1.1 {RATIO} (ref 1–2)
ALP LIVER SERPL-CCNC: 89 U/L
ALP LIVER SERPL-CCNC: 95 U/L
ALT SERPL-CCNC: 54 U/L
ALT SERPL-CCNC: 55 U/L
ANION GAP SERPL CALC-SCNC: 4 MMOL/L (ref 0–18)
ANION GAP SERPL CALC-SCNC: 5 MMOL/L (ref 0–18)
APTT PPP: 34 SECONDS (ref 23.3–35.6)
AST SERPL-CCNC: 39 U/L (ref 15–37)
AST SERPL-CCNC: 40 U/L (ref 15–37)
BASOPHILS # BLD AUTO: 0.02 X10(3) UL (ref 0–0.2)
BASOPHILS # BLD AUTO: 0.02 X10(3) UL (ref 0–0.2)
BASOPHILS NFR BLD AUTO: 0.3 %
BASOPHILS NFR BLD AUTO: 0.4 %
BILIRUB SERPL-MCNC: 1.1 MG/DL (ref 0.1–2)
BILIRUB SERPL-MCNC: 1.2 MG/DL (ref 0.1–2)
BILIRUB UR QL STRIP.AUTO: NEGATIVE
BUN BLD-MCNC: 14 MG/DL (ref 7–18)
BUN BLD-MCNC: 16 MG/DL (ref 7–18)
CALCIUM BLD-MCNC: 9 MG/DL (ref 8.5–10.1)
CALCIUM BLD-MCNC: 9.1 MG/DL (ref 8.5–10.1)
CHLORIDE SERPL-SCNC: 112 MMOL/L (ref 98–112)
CHLORIDE SERPL-SCNC: 113 MMOL/L (ref 98–112)
CO2 SERPL-SCNC: 26 MMOL/L (ref 21–32)
CO2 SERPL-SCNC: 27 MMOL/L (ref 21–32)
CREAT BLD-MCNC: 0.94 MG/DL
CREAT BLD-MCNC: 0.97 MG/DL
EOSINOPHIL # BLD AUTO: 0.04 X10(3) UL (ref 0–0.7)
EOSINOPHIL # BLD AUTO: 0.04 X10(3) UL (ref 0–0.7)
EOSINOPHIL NFR BLD AUTO: 0.7 %
EOSINOPHIL NFR BLD AUTO: 0.7 %
ERYTHROCYTE [DISTWIDTH] IN BLOOD BY AUTOMATED COUNT: 14 %
ERYTHROCYTE [DISTWIDTH] IN BLOOD BY AUTOMATED COUNT: 14.1 %
FASTING STATUS PATIENT QL REPORTED: NO
GLOBULIN PLAS-MCNC: 3.2 G/DL (ref 2.8–4.4)
GLOBULIN PLAS-MCNC: 4 G/DL (ref 2.8–4.4)
GLUCOSE BLD-MCNC: 93 MG/DL (ref 70–99)
GLUCOSE BLD-MCNC: 98 MG/DL (ref 70–99)
GLUCOSE UR STRIP.AUTO-MCNC: NEGATIVE MG/DL
HCT VFR BLD AUTO: 37.3 %
HCT VFR BLD AUTO: 37.3 %
HGB BLD-MCNC: 11.8 G/DL
HGB BLD-MCNC: 12.4 G/DL
IMM GRANULOCYTES # BLD AUTO: 0.04 X10(3) UL (ref 0–1)
IMM GRANULOCYTES # BLD AUTO: 0.04 X10(3) UL (ref 0–1)
IMM GRANULOCYTES NFR BLD: 0.7 %
IMM GRANULOCYTES NFR BLD: 0.7 %
INR BLD: 1.21 (ref 0.8–1.2)
KETONES UR STRIP.AUTO-MCNC: 20 MG/DL
LEUKOCYTE ESTERASE UR QL STRIP.AUTO: NEGATIVE
LYMPHOCYTES # BLD AUTO: 1.01 X10(3) UL (ref 1–4)
LYMPHOCYTES # BLD AUTO: 1.36 X10(3) UL (ref 1–4)
LYMPHOCYTES NFR BLD AUTO: 18.5 %
LYMPHOCYTES NFR BLD AUTO: 22.5 %
MCH RBC QN AUTO: 28.9 PG (ref 26–34)
MCH RBC QN AUTO: 30.1 PG (ref 26–34)
MCHC RBC AUTO-ENTMCNC: 31.6 G/DL (ref 31–37)
MCHC RBC AUTO-ENTMCNC: 33.2 G/DL (ref 31–37)
MCV RBC AUTO: 90.5 FL
MCV RBC AUTO: 91.2 FL
MONOCYTES # BLD AUTO: 0.76 X10(3) UL (ref 0.1–1)
MONOCYTES # BLD AUTO: 0.92 X10(3) UL (ref 0.1–1)
MONOCYTES NFR BLD AUTO: 13.9 %
MONOCYTES NFR BLD AUTO: 15.2 %
NEUTROPHILS # BLD AUTO: 3.59 X10 (3) UL (ref 1.5–7.7)
NEUTROPHILS # BLD AUTO: 3.59 X10(3) UL (ref 1.5–7.7)
NEUTROPHILS # BLD AUTO: 3.66 X10 (3) UL (ref 1.5–7.7)
NEUTROPHILS # BLD AUTO: 3.66 X10(3) UL (ref 1.5–7.7)
NEUTROPHILS NFR BLD AUTO: 60.6 %
NEUTROPHILS NFR BLD AUTO: 65.8 %
NITRITE UR QL STRIP.AUTO: NEGATIVE
OSMOLALITY SERPL CALC.SUM OF ELEC: 296 MOSM/KG (ref 275–295)
OSMOLALITY SERPL CALC.SUM OF ELEC: 299 MOSM/KG (ref 275–295)
PH UR STRIP.AUTO: 5 [PH] (ref 5–8)
PLATELET # BLD AUTO: 105 10(3)UL (ref 150–450)
PLATELET # BLD AUTO: 113 10(3)UL (ref 150–450)
POTASSIUM SERPL-SCNC: 3.8 MMOL/L (ref 3.5–5.1)
POTASSIUM SERPL-SCNC: 3.8 MMOL/L (ref 3.5–5.1)
PROT SERPL-MCNC: 6.7 G/DL (ref 6.4–8.2)
PROT SERPL-MCNC: 7.2 G/DL (ref 6.4–8.2)
PROT UR STRIP.AUTO-MCNC: 30 MG/DL
PROTHROMBIN TIME: 15.3 SECONDS (ref 11.6–14.8)
RBC # BLD AUTO: 4.09 X10(6)UL
RBC # BLD AUTO: 4.12 X10(6)UL
RBC UR QL AUTO: NEGATIVE
SODIUM SERPL-SCNC: 143 MMOL/L (ref 136–145)
SODIUM SERPL-SCNC: 144 MMOL/L (ref 136–145)
SP GR UR STRIP.AUTO: >1.03 (ref 1–1.03)
TROPONIN I HIGH SENSITIVITY: 13 NG/L
UROBILINOGEN UR STRIP.AUTO-MCNC: 2 MG/DL
WBC # BLD AUTO: 5.5 X10(3) UL (ref 4–11)
WBC # BLD AUTO: 6 X10(3) UL (ref 4–11)

## 2022-01-21 PROCEDURE — 85025 COMPLETE CBC W/AUTO DIFF WBC: CPT

## 2022-01-21 PROCEDURE — 71046 X-RAY EXAM CHEST 2 VIEWS: CPT | Performed by: FAMILY MEDICINE

## 2022-01-21 PROCEDURE — 70450 CT HEAD/BRAIN W/O DYE: CPT | Performed by: STUDENT IN AN ORGANIZED HEALTH CARE EDUCATION/TRAINING PROGRAM

## 2022-01-21 PROCEDURE — 36415 COLL VENOUS BLD VENIPUNCTURE: CPT

## 2022-01-21 PROCEDURE — 72110 X-RAY EXAM L-2 SPINE 4/>VWS: CPT | Performed by: STUDENT IN AN ORGANIZED HEALTH CARE EDUCATION/TRAINING PROGRAM

## 2022-01-21 PROCEDURE — 99283 EMERGENCY DEPT VISIT LOW MDM: CPT | Performed by: HOSPITALIST

## 2022-01-21 PROCEDURE — 71045 X-RAY EXAM CHEST 1 VIEW: CPT | Performed by: STUDENT IN AN ORGANIZED HEALTH CARE EDUCATION/TRAINING PROGRAM

## 2022-01-21 PROCEDURE — 99214 OFFICE O/P EST MOD 30 MIN: CPT | Performed by: FAMILY MEDICINE

## 2022-01-21 PROCEDURE — 80053 COMPREHEN METABOLIC PANEL: CPT

## 2022-01-21 RX ORDER — ATORVASTATIN CALCIUM 20 MG/1
20 TABLET, FILM COATED ORAL NIGHTLY
Status: CANCELLED | OUTPATIENT
Start: 2022-01-21

## 2022-01-21 RX ORDER — CLONAZEPAM 0.5 MG/1
1 TABLET ORAL NIGHTLY PRN
Status: CANCELLED | OUTPATIENT
Start: 2022-01-21

## 2022-01-21 RX ORDER — METOCLOPRAMIDE HYDROCHLORIDE 5 MG/ML
10 INJECTION INTRAMUSCULAR; INTRAVENOUS EVERY 8 HOURS PRN
Status: CANCELLED | OUTPATIENT
Start: 2022-01-21

## 2022-01-21 RX ORDER — TRAMADOL HYDROCHLORIDE 50 MG/1
50 TABLET ORAL EVERY 8 HOURS PRN
Status: CANCELLED | OUTPATIENT
Start: 2022-01-21

## 2022-01-21 RX ORDER — CLOPIDOGREL BISULFATE 75 MG/1
75 TABLET ORAL DAILY
Status: CANCELLED | OUTPATIENT
Start: 2022-01-21

## 2022-01-21 RX ORDER — ENOXAPARIN SODIUM 100 MG/ML
40 INJECTION SUBCUTANEOUS DAILY
Status: CANCELLED | OUTPATIENT
Start: 2022-01-22

## 2022-01-21 RX ORDER — ONDANSETRON 2 MG/ML
4 INJECTION INTRAMUSCULAR; INTRAVENOUS EVERY 6 HOURS PRN
Status: CANCELLED | OUTPATIENT
Start: 2022-01-21

## 2022-01-21 RX ORDER — METOPROLOL SUCCINATE 50 MG/1
50 TABLET, EXTENDED RELEASE ORAL 2 TIMES DAILY
COMMUNITY
Start: 2022-01-20

## 2022-01-21 RX ORDER — DIPHENHYDRAMINE HYDROCHLORIDE 50 MG/ML
25 INJECTION INTRAMUSCULAR; INTRAVENOUS ONCE
Status: COMPLETED | OUTPATIENT
Start: 2022-01-21 | End: 2022-01-22

## 2022-01-21 RX ORDER — ISOSORBIDE MONONITRATE 60 MG/1
180 TABLET, EXTENDED RELEASE ORAL DAILY
Status: CANCELLED | OUTPATIENT
Start: 2022-01-21

## 2022-01-21 RX ORDER — TRAMADOL HYDROCHLORIDE 50 MG/1
50 TABLET ORAL ONCE
Status: COMPLETED | OUTPATIENT
Start: 2022-01-21 | End: 2022-01-21

## 2022-01-21 RX ORDER — MELATONIN
3 NIGHTLY PRN
Status: CANCELLED | OUTPATIENT
Start: 2022-01-21

## 2022-01-21 RX ORDER — LORAZEPAM 2 MG/ML
1 INJECTION INTRAMUSCULAR ONCE
Status: COMPLETED | OUTPATIENT
Start: 2022-01-21 | End: 2022-01-21

## 2022-01-21 RX ORDER — CLONAZEPAM 0.5 MG/1
1 TABLET ORAL ONCE
Status: COMPLETED | OUTPATIENT
Start: 2022-01-21 | End: 2022-01-21

## 2022-01-21 RX ORDER — DONEPEZIL HYDROCHLORIDE 10 MG/1
10 TABLET, ORALLY DISINTEGRATING ORAL NIGHTLY
Status: CANCELLED | OUTPATIENT
Start: 2022-01-21

## 2022-01-21 RX ORDER — PRAVASTATIN SODIUM 80 MG/1
80 TABLET ORAL NIGHTLY
COMMUNITY
Start: 2021-11-20

## 2022-01-21 RX ORDER — ACETAMINOPHEN 325 MG/1
650 TABLET ORAL EVERY 6 HOURS PRN
Status: CANCELLED | OUTPATIENT
Start: 2022-01-21

## 2022-01-21 RX ORDER — SODIUM CHLORIDE 9 MG/ML
125 INJECTION, SOLUTION INTRAVENOUS CONTINUOUS
Status: DISCONTINUED | OUTPATIENT
Start: 2022-01-21 | End: 2022-01-22

## 2022-01-21 RX ORDER — CETIRIZINE HYDROCHLORIDE 10 MG/1
10 TABLET ORAL DAILY PRN
Status: CANCELLED | OUTPATIENT
Start: 2022-01-21

## 2022-01-21 RX ORDER — TERAZOSIN 10 MG/1
10 CAPSULE ORAL NIGHTLY
Refills: 3 | Status: CANCELLED | OUTPATIENT
Start: 2022-01-21

## 2022-01-21 RX ORDER — PANTOPRAZOLE SODIUM 40 MG/1
40 TABLET, DELAYED RELEASE ORAL
Status: CANCELLED | OUTPATIENT
Start: 2022-01-22

## 2022-01-21 RX ORDER — METOPROLOL SUCCINATE 50 MG/1
50 TABLET, EXTENDED RELEASE ORAL 2 TIMES DAILY
Status: CANCELLED | OUTPATIENT
Start: 2022-01-21

## 2022-01-21 NOTE — TELEPHONE ENCOUNTER
Pt daughter is starting to see test results coming in to [facility]. She is asking if [facility] can look at them asap because she is getting ready to leave and has a 2 hr drive. Her dad is not doing well.

## 2022-01-21 NOTE — ED QUICK NOTES
Orders for admission, patient is aware of plan and ready to go upstairs. Any questions, please call ED RN at [phone]    Vaccinated?  No  Type of COVID test sent: pt covid (+) 1/10/2022  COVID Suspicion level: Covid (+)      Titratable drug(s) infusing:none  R

## 2022-01-21 NOTE — TELEPHONE ENCOUNTER
[facility] ED [address] for notification of ED arrival.   Patient sent r/t weakness, worsening pneumonia, deteriorating r/t covid dx, encephalopathy. Notified [facility] will be calling to discuss with ED provider as well.  Report given at 3:50

## 2022-01-21 NOTE — PROGRESS NOTES
Due to COVID-19 ACTION PLAN, the patient's office visit was converted to a video visit with informed patient consent. Time Spent: 35 min    Subjective     HPI:   Zakiya Munson. is a 66year old male who presents for follow-up.  He was recently [facility] hospitalization with overall normal MRI/MRA and has since had follow-up with his outpatient neurologist. He is unable to care for himself at this time. We will work to obtain daily nursing care at home while his daughter is looking into SNF options.      Hi labs, medications, radiology tests and decision making. Appropriate medical decision-making and tests are ordered as detailed in the plan of care above.

## 2022-01-21 NOTE — TELEPHONE ENCOUNTER
Spoke with [facility] at home care whom patient received PT / OT with. Patient was last seen today. Nursing care per [address] to be added 3x weekly. [facility] indicates 3x weekly is the max visits per week.     Patient will need SNF due to mental status changes, co

## 2022-01-21 NOTE — ED QUICK NOTES
Pt straight cath aseptically per policy with a return of 110 ml danielle urine. Pt tolerated well.  UA sent

## 2022-01-21 NOTE — TELEPHONE ENCOUNTER
Dr. Tessa Michaels stated he wanted to [facility] regarding [facility] and their health. I gave [facility] message through secure chat, [facility] will contact doctor. [facility]  Allergy  [address] [place] [address]  Phone: [phone]  Fax:   Follow Up Time: Routine

## 2022-01-21 NOTE — ED INITIAL ASSESSMENT (HPI)
Per pts daughter, pt recently discharged from hospital with covid. Pts daughter states pt has been increasingly weak, lethargic, hasnt been eating, increasing confusion. Pt had recent CXR which showed covid pneumonia.  Pts daughter states, pt fell going to

## 2022-01-22 VITALS
WEIGHT: 200 LBS | SYSTOLIC BLOOD PRESSURE: 112 MMHG | RESPIRATION RATE: 17 BRPM | DIASTOLIC BLOOD PRESSURE: 63 MMHG | HEIGHT: 65 IN | TEMPERATURE: 98 F | BODY MASS INDEX: 33.32 KG/M2 | OXYGEN SATURATION: 94 % | HEART RATE: 89 BPM

## 2022-01-22 RX ORDER — PANTOPRAZOLE SODIUM 40 MG/1
40 TABLET, DELAYED RELEASE ORAL ONCE
Status: COMPLETED | OUTPATIENT
Start: 2022-01-22 | End: 2022-01-22

## 2022-01-22 RX ORDER — HALOPERIDOL 5 MG/ML
2 INJECTION INTRAMUSCULAR ONCE
Status: COMPLETED | OUTPATIENT
Start: 2022-01-22 | End: 2022-01-22

## 2022-01-22 RX ORDER — ASPIRIN 81 MG/1
81 TABLET, CHEWABLE ORAL ONCE
Status: COMPLETED | OUTPATIENT
Start: 2022-01-22 | End: 2022-01-22

## 2022-01-22 RX ORDER — DIPHENHYDRAMINE HYDROCHLORIDE 50 MG/ML
25 INJECTION INTRAMUSCULAR; INTRAVENOUS ONCE
Status: COMPLETED | OUTPATIENT
Start: 2022-01-22 | End: 2022-01-22

## 2022-01-22 RX ORDER — CLOPIDOGREL BISULFATE 75 MG/1
75 TABLET ORAL ONCE
Status: COMPLETED | OUTPATIENT
Start: 2022-01-22 | End: 2022-01-22

## 2022-01-22 NOTE — CM/SW NOTE
Yadira Caldwell from [facility] called [facility]. informed her of pt's need for SNF placement and of daughter's request for patient to go to their facility. Yadira Caldwell also aware pt's SNF referral was placed via [facility].  Per Yadira Caldwell she will review

## 2022-01-22 NOTE — ED QUICK NOTES
Pt report given to [employer]. Pt going to [facility] EM[facility]. Pt and family all aware of pt going to the facility.

## 2022-01-22 NOTE — CM/SW NOTE
Pt's daughter called [facility]. stating she would now like to see if patient able to go to The [address] [place] [address] Way as that is close to her home.      ERCM called [facility] (which is in [place]) and spoke with Frieda Frias she will reac

## 2022-01-22 NOTE — CM/SW NOTE
Per Tess French at the [facility] she can accept the patient today as soon as the patient has had some food. I spoke to daughter Doretha Early) earlier about the patient as well. Tess French will give Pippa Elliott a call to inform her of the above.

## 2022-01-22 NOTE — CM/SW NOTE
Daughter at bedside. Patient with recent admissions and ongoing weakness. Covid +. Daughter is not able to take care of patient at home. Patient needs skilled rehab to increase strength in hopes of returning home.   Patient is caregiver for spouse, who

## 2022-01-22 NOTE — ED QUICK NOTES
Pt transported [facility] to [facility], awake and alert. . comfortable.  No acute discomfort

## 2022-01-22 NOTE — ED PROVIDER NOTES
Patient Seen in: [facility] Emergency Department      History   Patient presents with:  Altered Mental Status  Fatigue    Stated Complaint: covd,dx on 1/20 d/c'd from here 8 days ago    Subjective:   HPI    Patient is a 69-year-old gentleman present EPIDURAL; LUMBAR/SACRAL, SINGLE LEVEL  6/29/2012    Procedure: TRANSFORAMINAL EPIDURAL - LUMBAR;  Surgeon: Magen Waggoner MD;  Location: [facility] FOR PAIN MANAGEMENT   • INJECTION, ANESTHETIC/STEROID, TRANSFORAMINAL EPIDURAL; LUMBAR/SACRAL, SINGLE LEVEL for stated complaint: covd,dx on 1/20 d/c'd from here 8 days ago  Other systems are as noted in HPI. Constitutional and vital signs reviewed. All other systems reviewed and negative except as noted above.     Physical Exam     ED Triage Vitals [01/21/ components:    PT 15.3 (*)     INR 1.21 (*)     All other components within normal limits   URINALYSIS WITH CULTURE REFLEX - Abnormal; Notable for the following components:    Urine Color Yuliya (*)     Clarity Urine Hazy (*)     Spec Gravity >1.030 (*) attempted out of the emergency department. In the event that we are not able to accomplish that the patient would be admitted to the hospital to ensure that he remains safe.               Disposition and Plan     Clinical Impression:  COVID-19 virus infect

## 2022-01-22 NOTE — ED NOTES
Patient having difficulty falling asleep in the hallway.   He is awaiting nursing home placement in the morning, in order to facilitate his ability to sleep we will give him a dose of Ativan here to help him rest

## 2022-01-22 NOTE — H&P
HEMAL HOSPITALIST  History and Physical     Pat Laming.  Patient Status:  Emergency    10/17/1950 MRN [id number]   Location [address] Attending Katie Fuentes MD   [facility] Day # 0 PCP Stephania Michaels MD     Chief Com 2014   • ANGIOPLASTY (CORONARY)      11/2015   • ANGIOPLASTY (CORONARY)  1/12/16    PTCA-right Posterior   • CABG      1988, 1994   • CATARACT     • CATH BARE METAL STENT (BMS)     • COLONOSCOPY     • COLONOSCOPY     • HERNIA SURGERY  2005    multiple h [facility]   • PATIENT [place] PREOPERATIVE ORDER FOR IV ANTIBIOTIC SURGICAL SITE INFECTION PROPHYLAXIS.  5/13/2014    Procedure: ;  Surgeon: Marquis Anna MD;  Location: [address] [place] [address] FOR PAIN MANAGEMENT       Social History:  reports Tablet 24 Hr, Take 180 mg by mouth daily. , Disp: , Rfl:         Review of Systems:   A comprehensive 14 point review of systems was completed. Pertinent positives and negatives noted in the HPI.     Physical Exam:    /79   Pulse 72   Temp 98.2 °F ( found  2. PT/OT  2. COVID-19  1. Asymptomatic  2. Status post monoclonal antibodies last admission  3. No signs of respiratory symptoms and no further treatment is needed  3. Rheumatoid arthritis  4. Hypertension  5.  Hyperlipidemia    Quality:  · DVT Proph

## 2022-01-22 NOTE — ED NOTES
Patient was signed over pending case management attempting to get placement to nursing home. However patient will not be able to place until morning per case management.   The previous physician had already spoken with the hospitalist regarding this and th

## 2022-01-22 NOTE — ED NOTES
Patient has been much more calm and less agitated since getting to the Benadryl. He is sleeping comfortably now in the room without distress.   He is awaiting nursing home placement in SNF nursing home at approximately 8 AM.

## 2022-01-22 NOTE — CM/SW NOTE
LUIS spoke with Sole with The [address] [place] [address] Devoid she reviewed pt's records in [facility] and due to pt's dementia she would like for him to go to their Memory Care Unit for PT/OT and she does not have a bed in their Memory Care Unit tonight, howev

## 2022-01-22 NOTE — CM/SW NOTE
LUIS spoke with MILAGRO Randhawa ER Charge and informed her pt will likely be accepted to [facility] by 0800 - she is agreeable to patient staying in ER until AM. New bed request placed for patient by Dr. Dain Grissom called Dr. Franky Ramirez and informed her

## 2022-01-22 NOTE — CM/SW NOTE
Rec'd handoff report from [facility]. Patient needs SNF placement. LUIS Dupont spoke with pt's daughter re: SNF placement - daughter agreeable and requesting [facility] or The [address] [place] [address].      LUIS Dupont was informed The [facility]

## 2022-01-22 NOTE — ED NOTES
Callback from the , the patient will be held in the ER until the morning when he will be placed at a SNF placement and [place]. Patient will not be admitted to the floor as per case management.

## 2022-01-22 NOTE — ED QUICK NOTES
Pt given Klonopin and Tramadol at 2214, requested his noc meds. Pt able to doze after meds given, he then woke up at 2332. Pt restless and fidgety, making numerous attempts to get out of bed. Pt non compliant with verbal cues to stay in bed.  He was given m

## 2022-01-23 NOTE — CM/SW NOTE
Daughter (gabby) [facility] [address] [place] [address] since patient is currently there and facility is requesting them. Fax [phone].

## 2022-01-24 ENCOUNTER — PATIENT OUTREACH (OUTPATIENT)
Dept: CASE MANAGEMENT | Age: 72
End: 2022-01-24

## 2022-01-24 LAB
ATRIAL RATE: 76 BPM
P AXIS: -17 DEGREES
P-R INTERVAL: 164 MS
Q-T INTERVAL: 452 MS
QRS DURATION: 144 MS
QTC CALCULATION (BEZET): 508 MS
R AXIS: 58 DEGREES
T AXIS: -2 DEGREES
VENTRICULAR RATE: 76 BPM

## 2022-01-24 NOTE — PROGRESS NOTES
Chart reviewed. After patient's virtual visit on Friday patient was sent to the ER. From the ER patient was discharged to SNF. Patient was admitted into the [facility] into the memory unit. No needs for [facility] to address at this time.   Leonard flores

## 2022-01-25 ENCOUNTER — TELEPHONE (OUTPATIENT)
Dept: INTERNAL MEDICINE CLINIC | Facility: CLINIC | Age: 72
End: 2022-01-25

## 2022-01-25 NOTE — TELEPHONE ENCOUNTER
Paperwork was received from [address] [place] [address] dated 1/8/22-3/8/22. It was placed on [address] [facility] desk for review and signature.

## 2022-01-25 NOTE — TELEPHONE ENCOUNTER
Faxed back with [facility] signature and fax was confirmed   sent copy to scan

## 2022-01-25 NOTE — TELEPHONE ENCOUNTER
Received fax from [address] [place] [address] in 's bin for review and signature  Needs to be faxed back

## 2022-01-27 ENCOUNTER — TELEPHONE (OUTPATIENT)
Dept: INTERNAL MEDICINE CLINIC | Facility: CLINIC | Age: 72
End: 2022-01-27

## 2022-01-27 ENCOUNTER — TELEPHONE (OUTPATIENT)
Dept: NEUROLOGY | Age: 72
End: 2022-01-27

## 2022-01-27 NOTE — TELEPHONE ENCOUNTER
Paperwork was received from [facility] and placed on [facility] desk for review and signature.

## 2022-01-31 PROCEDURE — 99490 CHRNC CARE MGMT STAFF 1ST 20: CPT

## 2022-02-03 ENCOUNTER — TELEPHONE (OUTPATIENT)
Dept: INTERNAL MEDICINE CLINIC | Facility: CLINIC | Age: 72
End: 2022-02-03

## 2022-02-03 NOTE — TELEPHONE ENCOUNTER
Signed paperwork was faxed to [facility] fax# [phone]. Confirmation of receipt was received and paperwork ws sent to scan.

## 2022-02-03 NOTE — TELEPHONE ENCOUNTER
Paperwork from [facility] at [facility] claim review and PT was placed on [employer] desk for review and signature.

## 2022-02-03 NOTE — TELEPHONE ENCOUNTER
Additional orders from [facility] at [facility] were placed on [facility] desk for review and signature.

## 2022-02-03 NOTE — TELEPHONE ENCOUNTER
Signed paperwork was faxed to [facility] on 1/25/22  fax# [phone].  Confirmation of receipt was received and paperwork was sent to scan

## 2022-02-07 ENCOUNTER — PATIENT OUTREACH (OUTPATIENT)
Dept: CASE MANAGEMENT | Age: 72
End: 2022-02-07

## 2022-02-08 ENCOUNTER — TELEPHONE (OUTPATIENT)
Dept: NEUROLOGY | Age: 72
End: 2022-02-08

## 2022-02-10 ENCOUNTER — TELEPHONE (OUTPATIENT)
Dept: NEUROLOGY | Age: 72
End: 2022-02-10

## 2022-02-10 ENCOUNTER — APPOINTMENT (OUTPATIENT)
Dept: NEUROLOGY | Age: 72
End: 2022-02-10

## 2022-02-10 NOTE — TELEPHONE ENCOUNTER
Signed paperwork was faxed back to [address] [place] [address]. Confirmation of receipt was received and paperwork was sent to scan.

## 2022-02-25 ENCOUNTER — OFFICE VISIT (OUTPATIENT)
Dept: NEUROLOGY | Age: 72
End: 2022-02-25

## 2022-02-25 VITALS
HEIGHT: 64 IN | RESPIRATION RATE: 16 BRPM | BODY MASS INDEX: 32.29 KG/M2 | SYSTOLIC BLOOD PRESSURE: 110 MMHG | WEIGHT: 189.15 LBS | DIASTOLIC BLOOD PRESSURE: 63 MMHG | HEART RATE: 78 BPM

## 2022-02-25 DIAGNOSIS — G30.1 LATE ONSET ALZHEIMER'S DEMENTIA WITHOUT BEHAVIORAL DISTURBANCE (CMD): Primary | ICD-10-CM

## 2022-02-25 DIAGNOSIS — F02.80 LATE ONSET ALZHEIMER'S DEMENTIA WITHOUT BEHAVIORAL DISTURBANCE (CMD): Primary | ICD-10-CM

## 2022-02-25 DIAGNOSIS — R10.12 LEFT UPPER QUADRANT ABDOMINAL PAIN: ICD-10-CM

## 2022-02-25 PROBLEM — R41.841 COGNITIVE COMMUNICATION DEFICIT: Status: ACTIVE | Noted: 2022-01-22

## 2022-02-25 PROCEDURE — 99215 OFFICE O/P EST HI 40 MIN: CPT | Performed by: PSYCHIATRY & NEUROLOGY

## 2022-02-25 PROCEDURE — G2212 PROLONG OUTPT/OFFICE VIS: HCPCS | Performed by: PSYCHIATRY & NEUROLOGY

## 2022-02-25 RX ORDER — PRAVASTATIN SODIUM 80 MG/1
80 TABLET ORAL NIGHTLY
COMMUNITY
Start: 2021-11-20 | End: 2023-03-30 | Stop reason: SDUPTHER

## 2022-02-25 ASSESSMENT — ENCOUNTER SYMPTOMS
NEUROLOGICAL NEGATIVE: 1
ENDOCRINE NEGATIVE: 1
CONSTITUTIONAL NEGATIVE: 1
HEMATOLOGIC/LYMPHATIC NEGATIVE: 1
RESPIRATORY NEGATIVE: 1
EYES NEGATIVE: 1
PSYCHIATRIC NEGATIVE: 1
BACK PAIN: 1

## 2022-02-25 ASSESSMENT — PATIENT HEALTH QUESTIONNAIRE - PHQ9
SUM OF ALL RESPONSES TO PHQ9 QUESTIONS 1 AND 2: 0
IS PATIENT ABLE TO COMPLETE PHQ2 OR PHQ9: YES
1. LITTLE INTEREST OR PLEASURE IN DOING THINGS: NOT AT ALL
2. FEELING DOWN, DEPRESSED OR HOPELESS: NOT AT ALL
SUM OF ALL RESPONSES TO PHQ9 QUESTIONS 1 AND 2: 0
CLINICAL INTERPRETATION OF PHQ2 SCORE: NO FURTHER SCREENING NEEDED

## 2022-02-28 PROCEDURE — 99490 CHRNC CARE MGMT STAFF 1ST 20: CPT

## 2022-03-02 ENCOUNTER — TELEPHONE (OUTPATIENT)
Dept: INTERNAL MEDICINE CLINIC | Facility: CLINIC | Age: 72
End: 2022-03-02

## 2022-03-02 NOTE — TELEPHONE ENCOUNTER
Spoke to pt. Pt wife, Rafal Burirs, asked me to cancel pt's appt because of cancelling her appt. Called pt to discuss this with him. Pt agreeable to cancelling his appt and rescheduling for a time him and his wife can come. Will call back to reschedule. Routing to [facility] for fyi.

## 2022-03-04 PROBLEM — F02.80 LATE ONSET ALZHEIMER DEMENTIA (CMD): Chronic | Status: ACTIVE | Noted: 2019-07-29

## 2022-03-04 PROBLEM — G30.1 LATE ONSET ALZHEIMER DEMENTIA (CMD): Chronic | Status: ACTIVE | Noted: 2019-07-29

## 2022-03-08 ENCOUNTER — PATIENT OUTREACH (OUTPATIENT)
Dept: CASE MANAGEMENT | Age: 72
End: 2022-03-08

## 2022-03-14 RX ORDER — PRAVASTATIN SODIUM 80 MG/1
TABLET ORAL
Qty: 90 TABLET | Refills: 0 | Status: SHIPPED | OUTPATIENT
Start: 2022-03-14

## 2022-03-24 ENCOUNTER — TELEPHONE (OUTPATIENT)
Dept: CARDIOLOGY | Age: 72
End: 2022-03-24

## 2022-03-28 RX ORDER — DONEPEZIL HYDROCHLORIDE 10 MG/1
10 TABLET, FILM COATED ORAL DAILY
Qty: 90 TABLET | Refills: 1 | Status: SHIPPED | OUTPATIENT
Start: 2022-03-28 | End: 2022-09-20 | Stop reason: SDUPTHER

## 2022-03-31 PROCEDURE — 99490 CHRNC CARE MGMT STAFF 1ST 20: CPT

## 2022-04-11 ENCOUNTER — PATIENT OUTREACH (OUTPATIENT)
Dept: CASE MANAGEMENT | Age: 72
End: 2022-04-11

## 2022-04-12 RX ORDER — PANTOPRAZOLE SODIUM 40 MG/1
40 TABLET, DELAYED RELEASE ORAL
Qty: 90 TABLET | Refills: 0 | Status: SHIPPED | OUTPATIENT
Start: 2022-04-12

## 2022-04-19 ENCOUNTER — TELEPHONE (OUTPATIENT)
Dept: INTERNAL MEDICINE CLINIC | Facility: CLINIC | Age: 72
End: 2022-04-19

## 2022-04-26 RX ORDER — CLONAZEPAM 1 MG/1
1 TABLET ORAL NIGHTLY PRN
Qty: 90 TABLET | Refills: 0 | Status: SHIPPED | OUTPATIENT
Start: 2022-04-26

## 2022-04-26 NOTE — TELEPHONE ENCOUNTER
Last OV 1.4.22 w/ TB (f/up)   Last PE 12.2.21  Last REFILL 12.23.21 Clonazepam 1mg #90 0R  Last LABS 1.21.22 CMP    No future appointments. Per PROTOCOL?  Not on protocol   Please Advise

## 2022-05-11 ENCOUNTER — PATIENT OUTREACH (OUTPATIENT)
Dept: CASE MANAGEMENT | Age: 72
End: 2022-05-11

## 2022-06-13 ENCOUNTER — PATIENT OUTREACH (OUTPATIENT)
Dept: CASE MANAGEMENT | Age: 72
End: 2022-06-13

## 2022-06-13 DIAGNOSIS — Z95.1 HX OF CABG: ICD-10-CM

## 2022-06-13 DIAGNOSIS — Z95.5 HISTORY OF CORONARY ARTERY STENT PLACEMENT: ICD-10-CM

## 2022-06-13 DIAGNOSIS — R97.20 ELEVATED PSA: ICD-10-CM

## 2022-06-13 DIAGNOSIS — K76.0 STEATOSIS OF LIVER: ICD-10-CM

## 2022-06-13 DIAGNOSIS — M47.812 SPONDYLOSIS OF CERVICAL REGION WITHOUT MYELOPATHY OR RADICULOPATHY: ICD-10-CM

## 2022-06-13 DIAGNOSIS — I25.2 H/O NON-ST ELEVATION MYOCARDIAL INFARCTION (NSTEMI): ICD-10-CM

## 2022-06-13 DIAGNOSIS — I10 PRIMARY HYPERTENSION: ICD-10-CM

## 2022-06-13 DIAGNOSIS — Z98.61 S/P PTCA (PERCUTANEOUS TRANSLUMINAL CORONARY ANGIOPLASTY): ICD-10-CM

## 2022-06-13 DIAGNOSIS — M65.4 DE QUERVAIN'S DISEASE (TENOSYNOVITIS): ICD-10-CM

## 2022-06-13 DIAGNOSIS — M06.041 RHEUMATOID ARTHRITIS INVOLVING BOTH HANDS WITH NEGATIVE RHEUMATOID FACTOR (HCC): ICD-10-CM

## 2022-06-13 DIAGNOSIS — E78.00 ELEVATED CHOLESTEROL: ICD-10-CM

## 2022-06-13 DIAGNOSIS — F01.50 VASCULAR DEMENTIA WITHOUT BEHAVIORAL DISTURBANCE (HCC): ICD-10-CM

## 2022-06-13 DIAGNOSIS — M06.042 RHEUMATOID ARTHRITIS INVOLVING BOTH HANDS WITH NEGATIVE RHEUMATOID FACTOR (HCC): ICD-10-CM

## 2022-06-13 DIAGNOSIS — R26.81 GAIT INSTABILITY: ICD-10-CM

## 2022-06-13 DIAGNOSIS — M19.049 CMC ARTHRITIS: ICD-10-CM

## 2022-06-13 DIAGNOSIS — G47.33 OBSTRUCTIVE SLEEP APNEA: ICD-10-CM

## 2022-06-13 DIAGNOSIS — I25.10 CORONARY ARTERY DISEASE INVOLVING NATIVE CORONARY ARTERY OF NATIVE HEART WITHOUT ANGINA PECTORIS: ICD-10-CM

## 2022-06-20 RX ORDER — PRAVASTATIN SODIUM 80 MG/1
TABLET ORAL
Qty: 90 TABLET | Refills: 1 | Status: SHIPPED | OUTPATIENT
Start: 2022-06-20

## 2022-06-20 NOTE — TELEPHONE ENCOUNTER
Pravastatin refilled per protocol ; patient is due for Medicare AWV in December ; please contact patient to schedule. Thank you.

## 2022-07-13 ENCOUNTER — PATIENT OUTREACH (OUTPATIENT)
Dept: CASE MANAGEMENT | Age: 72
End: 2022-07-13

## 2022-07-13 DIAGNOSIS — Z95.1 HX OF CABG: ICD-10-CM

## 2022-07-13 DIAGNOSIS — M06.042 RHEUMATOID ARTHRITIS INVOLVING BOTH HANDS WITH NEGATIVE RHEUMATOID FACTOR (HCC): ICD-10-CM

## 2022-07-13 DIAGNOSIS — M19.049 CMC ARTHRITIS: ICD-10-CM

## 2022-07-13 DIAGNOSIS — M65.4 DE QUERVAIN'S DISEASE (TENOSYNOVITIS): ICD-10-CM

## 2022-07-13 DIAGNOSIS — I25.10 CORONARY ARTERY DISEASE INVOLVING NATIVE CORONARY ARTERY OF NATIVE HEART WITHOUT ANGINA PECTORIS: ICD-10-CM

## 2022-07-13 DIAGNOSIS — F01.50 VASCULAR DEMENTIA WITHOUT BEHAVIORAL DISTURBANCE (HCC): ICD-10-CM

## 2022-07-13 DIAGNOSIS — M06.041 RHEUMATOID ARTHRITIS INVOLVING BOTH HANDS WITH NEGATIVE RHEUMATOID FACTOR (HCC): ICD-10-CM

## 2022-07-13 DIAGNOSIS — G47.33 OBSTRUCTIVE SLEEP APNEA: ICD-10-CM

## 2022-07-13 DIAGNOSIS — Z98.61 S/P PTCA (PERCUTANEOUS TRANSLUMINAL CORONARY ANGIOPLASTY): ICD-10-CM

## 2022-07-13 DIAGNOSIS — Z95.5 HISTORY OF CORONARY ARTERY STENT PLACEMENT: ICD-10-CM

## 2022-07-13 DIAGNOSIS — I25.2 H/O NON-ST ELEVATION MYOCARDIAL INFARCTION (NSTEMI): ICD-10-CM

## 2022-07-13 DIAGNOSIS — K76.0 STEATOSIS OF LIVER: ICD-10-CM

## 2022-07-13 DIAGNOSIS — I10 PRIMARY HYPERTENSION: ICD-10-CM

## 2022-07-13 DIAGNOSIS — E78.00 ELEVATED CHOLESTEROL: ICD-10-CM

## 2022-07-13 DIAGNOSIS — R26.81 GAIT INSTABILITY: ICD-10-CM

## 2022-07-19 RX ORDER — PANTOPRAZOLE SODIUM 40 MG/1
40 TABLET, DELAYED RELEASE ORAL
Qty: 90 TABLET | Refills: 0 | Status: SHIPPED | OUTPATIENT
Start: 2022-07-19

## 2022-07-26 RX ORDER — CLONAZEPAM 1 MG/1
1 TABLET ORAL NIGHTLY PRN
Qty: 90 TABLET | Refills: 0 | Status: SHIPPED | OUTPATIENT
Start: 2022-07-26

## 2022-07-26 NOTE — TELEPHONE ENCOUNTER
Last OV 1.4.22 w/ TB (f/up)   Last PE 12.2.21  Last REFILL 4.26.22 Clonzepam 1mg #90 0R  Last LABS 1.4.22 CBC    No future appointments. Per PROTOCOL?  Not on protocol     Please Advise

## 2022-07-27 RX ORDER — NITROGLYCERIN 0.4 MG/1
TABLET SUBLINGUAL
Qty: 25 TABLET | Refills: 0 | Status: SHIPPED | OUTPATIENT
Start: 2022-07-27 | End: 2022-10-24

## 2022-08-03 ENCOUNTER — PATIENT OUTREACH (OUTPATIENT)
Dept: CASE MANAGEMENT | Age: 72
End: 2022-08-03

## 2022-08-03 ENCOUNTER — TELEPHONE (OUTPATIENT)
Dept: INTERNAL MEDICINE CLINIC | Facility: CLINIC | Age: 72
End: 2022-08-03

## 2022-08-03 DIAGNOSIS — E78.00 ELEVATED CHOLESTEROL: ICD-10-CM

## 2022-08-03 DIAGNOSIS — I25.118 ATHEROSCLEROSIS OF NATIVE CORONARY ARTERY OF NATIVE HEART WITH STABLE ANGINA PECTORIS (CMD): Primary | ICD-10-CM

## 2022-08-03 DIAGNOSIS — Z98.61 S/P PTCA (PERCUTANEOUS TRANSLUMINAL CORONARY ANGIOPLASTY): ICD-10-CM

## 2022-08-03 DIAGNOSIS — I25.10 CORONARY ARTERY DISEASE INVOLVING NATIVE CORONARY ARTERY OF NATIVE HEART WITHOUT ANGINA PECTORIS: ICD-10-CM

## 2022-08-03 DIAGNOSIS — Z95.5 HISTORY OF CORONARY ARTERY STENT PLACEMENT: ICD-10-CM

## 2022-08-03 DIAGNOSIS — M65.4 DE QUERVAIN'S DISEASE (TENOSYNOVITIS): ICD-10-CM

## 2022-08-03 DIAGNOSIS — Z95.1 HX OF CABG: ICD-10-CM

## 2022-08-03 DIAGNOSIS — K76.0 STEATOSIS OF LIVER: ICD-10-CM

## 2022-08-03 DIAGNOSIS — M19.049 CMC ARTHRITIS: ICD-10-CM

## 2022-08-03 DIAGNOSIS — I10 PRIMARY HYPERTENSION: ICD-10-CM

## 2022-08-03 DIAGNOSIS — M06.042 RHEUMATOID ARTHRITIS INVOLVING BOTH HANDS WITH NEGATIVE RHEUMATOID FACTOR (HCC): ICD-10-CM

## 2022-08-03 DIAGNOSIS — Z12.5 SCREENING FOR MALIGNANT NEOPLASM OF PROSTATE: ICD-10-CM

## 2022-08-03 DIAGNOSIS — F01.50 VASCULAR DEMENTIA WITHOUT BEHAVIORAL DISTURBANCE (HCC): ICD-10-CM

## 2022-08-03 DIAGNOSIS — I25.2 H/O NON-ST ELEVATION MYOCARDIAL INFARCTION (NSTEMI): ICD-10-CM

## 2022-08-03 DIAGNOSIS — Z00.00 ROUTINE GENERAL MEDICAL EXAMINATION AT A HEALTH CARE FACILITY: Primary | ICD-10-CM

## 2022-08-03 DIAGNOSIS — M06.041 RHEUMATOID ARTHRITIS INVOLVING BOTH HANDS WITH NEGATIVE RHEUMATOID FACTOR (HCC): ICD-10-CM

## 2022-08-03 DIAGNOSIS — D69.6 THROMBOCYTOPENIA (HCC): ICD-10-CM

## 2022-08-03 NOTE — TELEPHONE ENCOUNTER
Triage: Good Day :)     Pt has upcoming visit with Dr. Lamin Kendall 8/16/22 and would like any labs placed so he can have done before visit. Notified I would contact the office and a nurse will call him back if further assessment is needed. Understanding verbalized by pt and voices no call back if nurse has no further questions. Please address and thank you in advance!

## 2022-08-16 ENCOUNTER — LAB ENCOUNTER (OUTPATIENT)
Dept: LAB | Age: 72
End: 2022-08-16
Attending: FAMILY MEDICINE
Payer: MEDICARE

## 2022-08-16 ENCOUNTER — OFFICE VISIT (OUTPATIENT)
Dept: INTERNAL MEDICINE CLINIC | Facility: CLINIC | Age: 72
End: 2022-08-16
Payer: MEDICARE

## 2022-08-16 VITALS
BODY MASS INDEX: 32.79 KG/M2 | WEIGHT: 196.81 LBS | HEIGHT: 65 IN | HEART RATE: 63 BPM | SYSTOLIC BLOOD PRESSURE: 124 MMHG | RESPIRATION RATE: 18 BRPM | DIASTOLIC BLOOD PRESSURE: 76 MMHG

## 2022-08-16 DIAGNOSIS — I25.10 CORONARY ARTERY DISEASE INVOLVING NATIVE CORONARY ARTERY OF NATIVE HEART WITHOUT ANGINA PECTORIS: ICD-10-CM

## 2022-08-16 DIAGNOSIS — I10 PRIMARY HYPERTENSION: ICD-10-CM

## 2022-08-16 DIAGNOSIS — D69.6 THROMBOCYTOPENIA (HCC): ICD-10-CM

## 2022-08-16 DIAGNOSIS — Z95.1 HX OF CABG: ICD-10-CM

## 2022-08-16 DIAGNOSIS — R00.2 PALPITATIONS: ICD-10-CM

## 2022-08-16 DIAGNOSIS — M54.50 CHRONIC MIDLINE LOW BACK PAIN WITHOUT SCIATICA: Primary | ICD-10-CM

## 2022-08-16 DIAGNOSIS — N40.1 BENIGN PROSTATIC HYPERPLASIA (BPH) WITH POST-VOID DRIBBLING: ICD-10-CM

## 2022-08-16 DIAGNOSIS — N39.43 BENIGN PROSTATIC HYPERPLASIA (BPH) WITH POST-VOID DRIBBLING: ICD-10-CM

## 2022-08-16 DIAGNOSIS — G89.29 CHRONIC MIDLINE LOW BACK PAIN WITHOUT SCIATICA: Primary | ICD-10-CM

## 2022-08-16 DIAGNOSIS — Z12.5 SCREENING FOR MALIGNANT NEOPLASM OF PROSTATE: ICD-10-CM

## 2022-08-16 DIAGNOSIS — Z00.00 ROUTINE GENERAL MEDICAL EXAMINATION AT A HEALTH CARE FACILITY: ICD-10-CM

## 2022-08-16 DIAGNOSIS — E78.00 ELEVATED CHOLESTEROL: ICD-10-CM

## 2022-08-16 DIAGNOSIS — Z12.11 SCREENING FOR COLON CANCER: ICD-10-CM

## 2022-08-16 LAB
ALBUMIN SERPL-MCNC: 3.9 G/DL (ref 3.4–5)
ALBUMIN/GLOB SERPL: 1.1 {RATIO} (ref 1–2)
ALP LIVER SERPL-CCNC: 75 U/L
ALT SERPL-CCNC: 25 U/L
ANION GAP SERPL CALC-SCNC: 4 MMOL/L (ref 0–18)
AST SERPL-CCNC: 13 U/L (ref 15–37)
BASOPHILS # BLD AUTO: 0.08 X10(3) UL (ref 0–0.2)
BASOPHILS NFR BLD AUTO: 0.9 %
BILIRUB SERPL-MCNC: 0.9 MG/DL (ref 0.1–2)
BUN BLD-MCNC: 21 MG/DL (ref 7–18)
BUN/CREAT SERPL: 17.4 (ref 10–20)
CALCIUM BLD-MCNC: 9.4 MG/DL (ref 8.5–10.1)
CHLORIDE SERPL-SCNC: 110 MMOL/L (ref 98–112)
CHOLEST SERPL-MCNC: 141 MG/DL (ref ?–200)
CO2 SERPL-SCNC: 28 MMOL/L (ref 21–32)
COMPLEXED PSA SERPL-MCNC: 11.6 NG/ML (ref ?–4)
CREAT BLD-MCNC: 1.21 MG/DL
DEPRECATED RDW RBC AUTO: 55.4 FL (ref 35.1–46.3)
EOSINOPHIL # BLD AUTO: 0.21 X10(3) UL (ref 0–0.7)
EOSINOPHIL NFR BLD AUTO: 2.5 %
ERYTHROCYTE [DISTWIDTH] IN BLOOD BY AUTOMATED COUNT: 15.8 % (ref 11–15)
FASTING PATIENT LIPID ANSWER: YES
FASTING STATUS PATIENT QL REPORTED: YES
GFR SERPLBLD BASED ON 1.73 SQ M-ARVRAT: 64 ML/MIN/1.73M2 (ref 60–?)
GLOBULIN PLAS-MCNC: 3.5 G/DL (ref 2.8–4.4)
GLUCOSE BLD-MCNC: 84 MG/DL (ref 70–99)
HCT VFR BLD AUTO: 41.3 %
HDLC SERPL-MCNC: 42 MG/DL (ref 40–59)
HGB BLD-MCNC: 13.1 G/DL
IMM GRANULOCYTES # BLD AUTO: 0.07 X10(3) UL (ref 0–1)
IMM GRANULOCYTES NFR BLD: 0.8 %
LDLC SERPL CALC-MCNC: 87 MG/DL (ref ?–100)
LYMPHOCYTES # BLD AUTO: 1.78 X10(3) UL (ref 1–4)
LYMPHOCYTES NFR BLD AUTO: 21 %
MCH RBC QN AUTO: 30.2 PG (ref 26–34)
MCHC RBC AUTO-ENTMCNC: 31.7 G/DL (ref 31–37)
MCV RBC AUTO: 95.2 FL
MONOCYTES # BLD AUTO: 0.84 X10(3) UL (ref 0.1–1)
MONOCYTES NFR BLD AUTO: 9.9 %
NEUTROPHILS # BLD AUTO: 5.49 X10 (3) UL (ref 1.5–7.7)
NEUTROPHILS # BLD AUTO: 5.49 X10(3) UL (ref 1.5–7.7)
NEUTROPHILS NFR BLD AUTO: 64.9 %
NONHDLC SERPL-MCNC: 99 MG/DL (ref ?–130)
OSMOLALITY SERPL CALC.SUM OF ELEC: 296 MOSM/KG (ref 275–295)
PLATELET # BLD AUTO: 161 10(3)UL (ref 150–450)
POTASSIUM SERPL-SCNC: 4.5 MMOL/L (ref 3.5–5.1)
PROT SERPL-MCNC: 7.4 G/DL (ref 6.4–8.2)
RBC # BLD AUTO: 4.34 X10(6)UL
SODIUM SERPL-SCNC: 142 MMOL/L (ref 136–145)
TRIGL SERPL-MCNC: 55 MG/DL (ref 30–149)
TSI SER-ACNC: 1.64 MIU/ML (ref 0.36–3.74)
VLDLC SERPL CALC-MCNC: 9 MG/DL (ref 0–30)
WBC # BLD AUTO: 8.5 X10(3) UL (ref 4–11)

## 2022-08-16 PROCEDURE — 93000 ELECTROCARDIOGRAM COMPLETE: CPT | Performed by: FAMILY MEDICINE

## 2022-08-16 PROCEDURE — 85025 COMPLETE CBC W/AUTO DIFF WBC: CPT

## 2022-08-16 PROCEDURE — 80053 COMPREHEN METABOLIC PANEL: CPT

## 2022-08-16 PROCEDURE — 84443 ASSAY THYROID STIM HORMONE: CPT

## 2022-08-16 PROCEDURE — 80061 LIPID PANEL: CPT

## 2022-08-16 PROCEDURE — 36415 COLL VENOUS BLD VENIPUNCTURE: CPT

## 2022-08-16 PROCEDURE — 99214 OFFICE O/P EST MOD 30 MIN: CPT | Performed by: FAMILY MEDICINE

## 2022-08-18 ENCOUNTER — OFFICE VISIT (OUTPATIENT)
Dept: CARDIOLOGY | Age: 72
End: 2022-08-18
Attending: INTERNAL MEDICINE

## 2022-08-18 VITALS
OXYGEN SATURATION: 99 % | DIASTOLIC BLOOD PRESSURE: 70 MMHG | RESPIRATION RATE: 20 BRPM | HEART RATE: 66 BPM | SYSTOLIC BLOOD PRESSURE: 115 MMHG | TEMPERATURE: 98.1 F

## 2022-08-18 DIAGNOSIS — Z95.1 HX OF CABG: Chronic | ICD-10-CM

## 2022-08-18 DIAGNOSIS — I25.118 ATHEROSCLEROSIS OF NATIVE CORONARY ARTERY OF NATIVE HEART WITH STABLE ANGINA PECTORIS (CMD): Primary | Chronic | ICD-10-CM

## 2022-08-18 DIAGNOSIS — Z95.5 S/P PRIMARY ANGIOPLASTY WITH CORONARY STENT: Chronic | ICD-10-CM

## 2022-08-18 DIAGNOSIS — E78.2 MIXED HYPERLIPIDEMIA: Chronic | ICD-10-CM

## 2022-08-18 DIAGNOSIS — I10 BENIGN ESSENTIAL HYPERTENSION: Chronic | ICD-10-CM

## 2022-08-18 DIAGNOSIS — I50.42 CHRONIC COMBINED SYSTOLIC AND DIASTOLIC HEART FAILURE (CMD): ICD-10-CM

## 2022-08-18 DIAGNOSIS — I25.2 H/O NON-ST ELEVATION MYOCARDIAL INFARCTION (NSTEMI): ICD-10-CM

## 2022-08-18 PROCEDURE — 99214 OFFICE O/P EST MOD 30 MIN: CPT | Performed by: INTERNAL MEDICINE

## 2022-08-18 ASSESSMENT — ENCOUNTER SYMPTOMS
ALLERGIC/IMMUNOLOGIC COMMENTS: NO NEW FOOD ALLERGIES
HEMOPTYSIS: 0
BRUISES/BLEEDS EASILY: 0
COUGH: 0
HEMATOCHEZIA: 0
WEIGHT LOSS: 0
SUSPICIOUS LESIONS: 0
FEVER: 0
WEIGHT GAIN: 0
CHILLS: 0

## 2022-09-02 ENCOUNTER — PATIENT OUTREACH (OUTPATIENT)
Dept: CASE MANAGEMENT | Age: 72
End: 2022-09-02

## 2022-09-02 DIAGNOSIS — M65.4 DE QUERVAIN'S DISEASE (TENOSYNOVITIS): ICD-10-CM

## 2022-09-02 DIAGNOSIS — Z95.1 HX OF CABG: ICD-10-CM

## 2022-09-02 DIAGNOSIS — F01.50 VASCULAR DEMENTIA WITHOUT BEHAVIORAL DISTURBANCE (HCC): ICD-10-CM

## 2022-09-02 DIAGNOSIS — M06.042 RHEUMATOID ARTHRITIS INVOLVING BOTH HANDS WITH NEGATIVE RHEUMATOID FACTOR (HCC): ICD-10-CM

## 2022-09-02 DIAGNOSIS — I10 PRIMARY HYPERTENSION: ICD-10-CM

## 2022-09-02 DIAGNOSIS — I25.10 CORONARY ARTERY DISEASE INVOLVING NATIVE CORONARY ARTERY OF NATIVE HEART WITHOUT ANGINA PECTORIS: ICD-10-CM

## 2022-09-02 DIAGNOSIS — E78.00 ELEVATED CHOLESTEROL: ICD-10-CM

## 2022-09-02 DIAGNOSIS — M06.041 RHEUMATOID ARTHRITIS INVOLVING BOTH HANDS WITH NEGATIVE RHEUMATOID FACTOR (HCC): ICD-10-CM

## 2022-09-02 DIAGNOSIS — G47.33 OBSTRUCTIVE SLEEP APNEA: ICD-10-CM

## 2022-09-02 DIAGNOSIS — Z95.5 HISTORY OF CORONARY ARTERY STENT PLACEMENT: ICD-10-CM

## 2022-09-02 DIAGNOSIS — K76.0 STEATOSIS OF LIVER: ICD-10-CM

## 2022-09-02 DIAGNOSIS — M19.049 CMC ARTHRITIS: ICD-10-CM

## 2022-09-02 DIAGNOSIS — I25.2 H/O NON-ST ELEVATION MYOCARDIAL INFARCTION (NSTEMI): ICD-10-CM

## 2022-09-02 DIAGNOSIS — Z98.61 S/P PTCA (PERCUTANEOUS TRANSLUMINAL CORONARY ANGIOPLASTY): ICD-10-CM

## 2022-09-02 RX ORDER — CLINDAMYCIN PHOSPHATE 10 UG/ML
LOTION TOPICAL
COMMUNITY
Start: 2022-08-09

## 2022-09-20 RX ORDER — DONEPEZIL HYDROCHLORIDE 10 MG/1
10 TABLET, FILM COATED ORAL DAILY
Qty: 90 TABLET | Refills: 1 | Status: SHIPPED | OUTPATIENT
Start: 2022-09-20 | End: 2023-04-25 | Stop reason: SDUPTHER

## 2022-09-21 ENCOUNTER — TELEPHONE (OUTPATIENT)
Dept: CARDIOLOGY | Age: 72
End: 2022-09-21

## 2022-10-04 ENCOUNTER — PATIENT OUTREACH (OUTPATIENT)
Dept: CASE MANAGEMENT | Age: 72
End: 2022-10-04

## 2022-10-04 DIAGNOSIS — M19.049 CMC ARTHRITIS: ICD-10-CM

## 2022-10-04 DIAGNOSIS — M06.042 RHEUMATOID ARTHRITIS INVOLVING BOTH HANDS WITH NEGATIVE RHEUMATOID FACTOR (HCC): ICD-10-CM

## 2022-10-04 DIAGNOSIS — R26.81 GAIT INSTABILITY: ICD-10-CM

## 2022-10-04 DIAGNOSIS — E78.00 ELEVATED CHOLESTEROL: ICD-10-CM

## 2022-10-04 DIAGNOSIS — G47.33 OBSTRUCTIVE SLEEP APNEA: ICD-10-CM

## 2022-10-04 DIAGNOSIS — F01.50 VASCULAR DEMENTIA WITHOUT BEHAVIORAL DISTURBANCE (HCC): ICD-10-CM

## 2022-10-04 DIAGNOSIS — Z95.1 HX OF CABG: ICD-10-CM

## 2022-10-04 DIAGNOSIS — I25.2 H/O NON-ST ELEVATION MYOCARDIAL INFARCTION (NSTEMI): ICD-10-CM

## 2022-10-04 DIAGNOSIS — R97.20 ELEVATED PSA: ICD-10-CM

## 2022-10-04 DIAGNOSIS — I10 PRIMARY HYPERTENSION: ICD-10-CM

## 2022-10-04 DIAGNOSIS — M65.4 DE QUERVAIN'S DISEASE (TENOSYNOVITIS): ICD-10-CM

## 2022-10-04 DIAGNOSIS — I25.10 CORONARY ARTERY DISEASE INVOLVING NATIVE CORONARY ARTERY OF NATIVE HEART WITHOUT ANGINA PECTORIS: ICD-10-CM

## 2022-10-04 DIAGNOSIS — Z95.5 HISTORY OF CORONARY ARTERY STENT PLACEMENT: ICD-10-CM

## 2022-10-04 DIAGNOSIS — M06.041 RHEUMATOID ARTHRITIS INVOLVING BOTH HANDS WITH NEGATIVE RHEUMATOID FACTOR (HCC): ICD-10-CM

## 2022-10-04 DIAGNOSIS — K76.0 STEATOSIS OF LIVER: ICD-10-CM

## 2022-10-05 ENCOUNTER — TELEPHONE (OUTPATIENT)
Dept: NEUROLOGY | Age: 72
End: 2022-10-05

## 2022-10-05 PROBLEM — Z86.73 HISTORY OF LACUNAR CEREBROVASCULAR ACCIDENT: Status: ACTIVE | Noted: 2018-05-08

## 2022-10-12 ENCOUNTER — TELEPHONE (OUTPATIENT)
Dept: NEUROLOGY | Facility: CLINIC | Age: 72
End: 2022-10-12

## 2022-10-18 RX ORDER — PANTOPRAZOLE SODIUM 40 MG/1
40 TABLET, DELAYED RELEASE ORAL
Qty: 90 TABLET | Refills: 0 | Status: SHIPPED | OUTPATIENT
Start: 2022-10-18

## 2022-10-24 RX ORDER — NITROGLYCERIN 0.4 MG/1
TABLET SUBLINGUAL
Qty: 25 TABLET | Refills: 0 | Status: SHIPPED | OUTPATIENT
Start: 2022-10-24 | End: 2023-03-30 | Stop reason: SDUPTHER

## 2022-10-26 RX ORDER — CLONAZEPAM 1 MG/1
1 TABLET ORAL NIGHTLY PRN
Qty: 90 TABLET | Refills: 0 | Status: SHIPPED | OUTPATIENT
Start: 2022-10-26

## 2022-10-26 NOTE — TELEPHONE ENCOUNTER
Pt's wife, on hipaa, checking status on the below script. Pt stated they're handicap and have to figure out how and when to  script. Please contact office when script is placed with pharm.

## 2022-10-26 NOTE — TELEPHONE ENCOUNTER
Last OV: 8/16/22 f/u   Last PE: 12/2/21    Future Appointments   Date Time Provider Edison Dey   12/6/2022 11:20 AM Mike Crystal MD [facility]        Latest labs: 8/16/22 PSA, Lipid, TSH, CMP and CBC    Latest RX: clonazepam- 90 tabs 0 refills on 7/26/22    Per protocol, not on protocol. Rx pending.

## 2022-11-03 ENCOUNTER — PATIENT OUTREACH (OUTPATIENT)
Dept: CASE MANAGEMENT | Age: 72
End: 2022-11-03

## 2022-11-03 ENCOUNTER — APPOINTMENT (OUTPATIENT)
Dept: CARDIOLOGY | Age: 72
End: 2022-11-03

## 2022-11-03 DIAGNOSIS — K76.0 STEATOSIS OF LIVER: ICD-10-CM

## 2022-11-03 DIAGNOSIS — M19.049 CMC ARTHRITIS: ICD-10-CM

## 2022-11-03 DIAGNOSIS — I25.10 CORONARY ARTERY DISEASE INVOLVING NATIVE CORONARY ARTERY OF NATIVE HEART WITHOUT ANGINA PECTORIS: ICD-10-CM

## 2022-11-03 DIAGNOSIS — G47.33 OBSTRUCTIVE SLEEP APNEA: ICD-10-CM

## 2022-11-03 DIAGNOSIS — Z95.5 HISTORY OF CORONARY ARTERY STENT PLACEMENT: ICD-10-CM

## 2022-11-03 DIAGNOSIS — I10 PRIMARY HYPERTENSION: ICD-10-CM

## 2022-11-03 DIAGNOSIS — F01.50 VASCULAR DEMENTIA WITHOUT BEHAVIORAL DISTURBANCE (HCC): ICD-10-CM

## 2022-11-03 DIAGNOSIS — Z95.1 HX OF CABG: ICD-10-CM

## 2022-11-03 DIAGNOSIS — E78.00 ELEVATED CHOLESTEROL: ICD-10-CM

## 2022-11-03 DIAGNOSIS — M06.042 RHEUMATOID ARTHRITIS INVOLVING BOTH HANDS WITH NEGATIVE RHEUMATOID FACTOR (HCC): ICD-10-CM

## 2022-11-03 DIAGNOSIS — Z98.61 S/P PTCA (PERCUTANEOUS TRANSLUMINAL CORONARY ANGIOPLASTY): ICD-10-CM

## 2022-11-03 DIAGNOSIS — I25.2 H/O NON-ST ELEVATION MYOCARDIAL INFARCTION (NSTEMI): ICD-10-CM

## 2022-11-03 DIAGNOSIS — M65.4 DE QUERVAIN'S DISEASE (TENOSYNOVITIS): ICD-10-CM

## 2022-11-03 DIAGNOSIS — M06.041 RHEUMATOID ARTHRITIS INVOLVING BOTH HANDS WITH NEGATIVE RHEUMATOID FACTOR (HCC): ICD-10-CM

## 2022-12-05 ENCOUNTER — PATIENT OUTREACH (OUTPATIENT)
Dept: CASE MANAGEMENT | Age: 72
End: 2022-12-05

## 2022-12-05 DIAGNOSIS — M19.049 CMC ARTHRITIS: ICD-10-CM

## 2022-12-05 DIAGNOSIS — M06.041 RHEUMATOID ARTHRITIS INVOLVING BOTH HANDS WITH NEGATIVE RHEUMATOID FACTOR (HCC): ICD-10-CM

## 2022-12-05 DIAGNOSIS — E78.00 ELEVATED CHOLESTEROL: ICD-10-CM

## 2022-12-05 DIAGNOSIS — M65.4 DE QUERVAIN'S DISEASE (TENOSYNOVITIS): ICD-10-CM

## 2022-12-05 DIAGNOSIS — Z95.1 HX OF CABG: ICD-10-CM

## 2022-12-05 DIAGNOSIS — Z95.5 HISTORY OF CORONARY ARTERY STENT PLACEMENT: ICD-10-CM

## 2022-12-05 DIAGNOSIS — I25.10 CORONARY ARTERY DISEASE INVOLVING NATIVE CORONARY ARTERY OF NATIVE HEART WITHOUT ANGINA PECTORIS: ICD-10-CM

## 2022-12-05 DIAGNOSIS — I10 PRIMARY HYPERTENSION: ICD-10-CM

## 2022-12-05 DIAGNOSIS — F01.50 VASCULAR DEMENTIA WITHOUT BEHAVIORAL DISTURBANCE (HCC): ICD-10-CM

## 2022-12-05 DIAGNOSIS — G47.33 OBSTRUCTIVE SLEEP APNEA: ICD-10-CM

## 2022-12-05 DIAGNOSIS — M06.042 RHEUMATOID ARTHRITIS INVOLVING BOTH HANDS WITH NEGATIVE RHEUMATOID FACTOR (HCC): ICD-10-CM

## 2022-12-05 RX ORDER — ISOSORBIDE MONONITRATE 60 MG/1
TABLET, EXTENDED RELEASE ORAL
Qty: 90 TABLET | Refills: 11 | Status: SHIPPED | OUTPATIENT
Start: 2022-12-05 | End: 2023-03-17 | Stop reason: SDUPTHER

## 2022-12-05 RX ORDER — METOPROLOL SUCCINATE 50 MG/1
50 TABLET, EXTENDED RELEASE ORAL 2 TIMES DAILY
Qty: 60 TABLET | Refills: 11 | Status: SHIPPED | OUTPATIENT
Start: 2022-12-05 | End: 2023-03-30 | Stop reason: SDUPTHER

## 2022-12-12 ENCOUNTER — PATIENT OUTREACH (OUTPATIENT)
Dept: CASE MANAGEMENT | Age: 72
End: 2022-12-12

## 2022-12-12 ENCOUNTER — TELEPHONE (OUTPATIENT)
Dept: CASE MANAGEMENT | Age: 72
End: 2022-12-12

## 2022-12-12 NOTE — TELEPHONE ENCOUNTER
Pt returned call. Advised to try topical lidocaine. Should call pain management regarding this as he is following with them. Pt stated that he agrees with [address], this should come from pain management. But he did try calling them and was told to contact his PCP. Pt not scheduled with pain management until 1/4/23. Pt stated he is contemplating seeing different pain management provider. He said he has tried creams and none of them work. Advised to try to call pain management again. In the meantime will see if there is anything else we can do/ask for pain management recs. Pt stated understanding and was thankful. [address], is there anything else we can do for pt. Pain management telling him to contact PCP until his appt with them. Recommend anyone at [facility] pain management if pt would like to go elsewhere?

## 2022-12-12 NOTE — TELEPHONE ENCOUNTER
Can try topical lidocaine. He is following with pain management regarding his this issue and management should be directed there.

## 2022-12-12 NOTE — PROGRESS NOTES
Pt states he had back injections placed by Dr. José Woods on 11/17/22. He has had no improvement and is in some fairly good pain he reports. Pt spoke with Dr. Kennedy Siemens nurse and asked for a few Tramadol to get through to his follow up visit on 1/04/23 but they refused and advised pt to take Tylenol OTC or call PCP. Pt is wondering if Dr. Perkins First can further direct. Notified I would contact the office and a nurse will call him back for further assessment. Understanding verbalized by pt.   Telephone encounter sent to [facility]    Total time spent with patient including chart review: 8  Time spent with patient this month: 30    Total time spent with communication and chart review this month to date: 27

## 2022-12-12 NOTE — TELEPHONE ENCOUNTER
Triage: Good Day :)     Pt states he had back injections placed by Dr. Maru Duque on 11/17/22. He has had no improvement and is in some fairly good pain he reports. Pt spoke with Dr. Cyrus Trinidad nurse and asked for a few Tramadol to get through to his follow up visit on 1/04/23 but they refused and advised pt to take Tylenol OTC or call PCP. Pt is wondering if Dr. Matheus Gonzalez can further direct. Notified I would contact the office and a nurse will call him back for further assessment. Understanding verbalized by pt. Please contact pt and thank you in advance!

## 2022-12-30 RX ORDER — PRAVASTATIN SODIUM 80 MG/1
TABLET ORAL
Qty: 90 TABLET | Refills: 0 | Status: SHIPPED | OUTPATIENT
Start: 2022-12-30

## 2022-12-30 NOTE — TELEPHONE ENCOUNTER
PASSED per protocol, refill sent. Last PE 12.2.21-Overdue, routed to front to schedule   No future appointments.

## 2023-01-05 ENCOUNTER — TELEPHONE (OUTPATIENT)
Dept: INTERNAL MEDICINE CLINIC | Facility: CLINIC | Age: 73
End: 2023-01-05

## 2023-01-05 DIAGNOSIS — I10 PRIMARY HYPERTENSION: ICD-10-CM

## 2023-01-05 DIAGNOSIS — E78.00 ELEVATED CHOLESTEROL: ICD-10-CM

## 2023-01-05 DIAGNOSIS — Z12.5 SCREENING FOR MALIGNANT NEOPLASM OF PROSTATE: ICD-10-CM

## 2023-01-05 DIAGNOSIS — Z00.00 ROUTINE GENERAL MEDICAL EXAMINATION AT A HEALTH CARE FACILITY: Primary | ICD-10-CM

## 2023-01-05 NOTE — TELEPHONE ENCOUNTER
Pt would like to do his labs 1-19-23 when he comes with his wife to her appt. Future Appointments   Date Time Provider Edison Yissel   2/16/2023  1:00 PM Carlos Mcguire MD [facility]       Informed must fast no call back required.  Orders to THE [facility]

## 2023-01-06 NOTE — TELEPHONE ENCOUNTER
Future Appointments   Date Time Provider Edison Yissel   2/16/2023  1:00 PM Mitzi Sharpe MD [facility]

## 2023-01-17 ENCOUNTER — TELEPHONE (OUTPATIENT)
Dept: NEUROLOGY | Age: 73
End: 2023-01-17

## 2023-01-23 ENCOUNTER — PATIENT OUTREACH (OUTPATIENT)
Dept: CASE MANAGEMENT | Age: 73
End: 2023-01-23

## 2023-01-23 DIAGNOSIS — E78.00 ELEVATED CHOLESTEROL: ICD-10-CM

## 2023-01-23 DIAGNOSIS — K76.0 STEATOSIS OF LIVER: ICD-10-CM

## 2023-01-23 DIAGNOSIS — M06.041 RHEUMATOID ARTHRITIS INVOLVING BOTH HANDS WITH NEGATIVE RHEUMATOID FACTOR (HCC): ICD-10-CM

## 2023-01-23 DIAGNOSIS — Z95.5 HISTORY OF CORONARY ARTERY STENT PLACEMENT: ICD-10-CM

## 2023-01-23 DIAGNOSIS — G47.33 OBSTRUCTIVE SLEEP APNEA: ICD-10-CM

## 2023-01-23 DIAGNOSIS — F01.50 VASCULAR DEMENTIA WITHOUT BEHAVIORAL DISTURBANCE (HCC): ICD-10-CM

## 2023-01-23 DIAGNOSIS — I10 PRIMARY HYPERTENSION: ICD-10-CM

## 2023-01-23 DIAGNOSIS — M19.049 CMC ARTHRITIS: ICD-10-CM

## 2023-01-23 DIAGNOSIS — M06.042 RHEUMATOID ARTHRITIS INVOLVING BOTH HANDS WITH NEGATIVE RHEUMATOID FACTOR (HCC): ICD-10-CM

## 2023-01-23 DIAGNOSIS — R26.81 GAIT INSTABILITY: ICD-10-CM

## 2023-01-23 DIAGNOSIS — I25.10 CORONARY ARTERY DISEASE INVOLVING NATIVE CORONARY ARTERY OF NATIVE HEART WITHOUT ANGINA PECTORIS: ICD-10-CM

## 2023-01-23 DIAGNOSIS — Z95.1 HX OF CABG: ICD-10-CM

## 2023-01-24 RX ORDER — PANTOPRAZOLE SODIUM 40 MG/1
40 TABLET, DELAYED RELEASE ORAL
Qty: 90 TABLET | Refills: 0 | Status: SHIPPED | OUTPATIENT
Start: 2023-01-24

## 2023-01-24 NOTE — TELEPHONE ENCOUNTER
Last OV 8.16.22 w/ [address] (f/up)   Last PE 12.2.21-PE scheduled for 2.16.23  Last REFILL 10.18.22 Pantoprazole 40mg #90 0R  Last LABS 8.16.22 CBC, CMP, TSH, Lipid, PSA    Future Appointments   Date Time Provider Edison Yissel   2/16/2023  1:00 PM Navdeep Garber MD [facility]         Per PROTOCOL?  Not on protocol     Please Advise

## 2023-01-25 ENCOUNTER — PATIENT OUTREACH (OUTPATIENT)
Dept: CASE MANAGEMENT | Age: 73
End: 2023-01-25

## 2023-01-25 NOTE — PROGRESS NOTES
Returned pt's all states he is in need of a refill for Clonazepam sent to Pharmacy on file. Pt states he is only taking one at night it really helps with his uncontrolled movements at night. Pt reports one night he had one and his arm accidentally hit his wife's nose which started to bleed. Pt says since starting Clonazepam this has helped and it controls his uncontrolled gestures at night.     Telephone encounter sent to [facility]    Total time spent with patient including chart review: 9  Time spent with patient this month: 34    Total time spent with communication and chart review this month to date: 29

## 2023-01-25 NOTE — TELEPHONE ENCOUNTER
Triage: Good Day :)     Pt requesting refill on Clonazepam.    LOV 8/16/22 with Dr. Xochilt Meza, [address] 2/16/23   Last filled 10/26/22 90 day     Please address and thank you in advance!   Refilled pended if appropriate for approval.

## 2023-01-26 ENCOUNTER — PATIENT OUTREACH (OUTPATIENT)
Dept: CASE MANAGEMENT | Age: 73
End: 2023-01-26

## 2023-01-26 ENCOUNTER — TELEPHONE (OUTPATIENT)
Dept: NEUROLOGY | Age: 73
End: 2023-01-26

## 2023-01-26 RX ORDER — CLONAZEPAM 1 MG/1
1 TABLET ORAL NIGHTLY PRN
Qty: 90 TABLET | Refills: 0 | Status: SHIPPED | OUTPATIENT
Start: 2023-01-26

## 2023-01-26 NOTE — PROGRESS NOTES
Pt inquiring about refill that was sent yesterday. Upon review of pt's chart refill approved and sent this morning. Understanding verbalized and voiced appreciation.       Total time spent with patient including chart review: 5  Time spent with patient this month: 39    Total time spent with communication and chart review this month to date: 44 patient

## 2023-01-30 RX ORDER — CLOPIDOGREL BISULFATE 75 MG/1
75 TABLET ORAL DAILY
Qty: 90 TABLET | Refills: 1 | Status: SHIPPED | OUTPATIENT
Start: 2023-01-30 | End: 2023-09-27

## 2023-02-01 ENCOUNTER — TELEPHONE (OUTPATIENT)
Dept: NEUROLOGY | Age: 73
End: 2023-02-01

## 2023-02-02 ENCOUNTER — LAB ENCOUNTER (OUTPATIENT)
Dept: LAB | Age: 73
End: 2023-02-02
Attending: FAMILY MEDICINE
Payer: MEDICARE

## 2023-02-02 DIAGNOSIS — I10 PRIMARY HYPERTENSION: ICD-10-CM

## 2023-02-02 DIAGNOSIS — Z12.5 SCREENING FOR MALIGNANT NEOPLASM OF PROSTATE: ICD-10-CM

## 2023-02-02 DIAGNOSIS — E78.00 ELEVATED CHOLESTEROL: ICD-10-CM

## 2023-02-02 DIAGNOSIS — Z00.00 ROUTINE GENERAL MEDICAL EXAMINATION AT A HEALTH CARE FACILITY: ICD-10-CM

## 2023-02-02 LAB
ALBUMIN SERPL-MCNC: 3.8 G/DL (ref 3.4–5)
ALBUMIN/GLOB SERPL: 1.2 {RATIO} (ref 1–2)
ALP LIVER SERPL-CCNC: 68 U/L
ALT SERPL-CCNC: 20 U/L
ANION GAP SERPL CALC-SCNC: 3 MMOL/L (ref 0–18)
AST SERPL-CCNC: 14 U/L (ref 15–37)
BASOPHILS # BLD AUTO: 0.1 X10(3) UL (ref 0–0.2)
BASOPHILS NFR BLD AUTO: 1 %
BILIRUB SERPL-MCNC: 1 MG/DL (ref 0.1–2)
BUN BLD-MCNC: 14 MG/DL (ref 7–18)
CALCIUM BLD-MCNC: 9.3 MG/DL (ref 8.5–10.1)
CHLORIDE SERPL-SCNC: 111 MMOL/L (ref 98–112)
CHOLEST SERPL-MCNC: 128 MG/DL (ref ?–200)
CO2 SERPL-SCNC: 29 MMOL/L (ref 21–32)
COMPLEXED PSA SERPL-MCNC: 10.9 NG/ML (ref ?–4)
CREAT BLD-MCNC: 1.07 MG/DL
EOSINOPHIL # BLD AUTO: 0.14 X10(3) UL (ref 0–0.7)
EOSINOPHIL NFR BLD AUTO: 1.3 %
ERYTHROCYTE [DISTWIDTH] IN BLOOD BY AUTOMATED COUNT: 16.3 %
FASTING PATIENT LIPID ANSWER: YES
FASTING STATUS PATIENT QL REPORTED: YES
GFR SERPLBLD BASED ON 1.73 SQ M-ARVRAT: 73 ML/MIN/1.73M2 (ref 60–?)
GLOBULIN PLAS-MCNC: 3.3 G/DL (ref 2.8–4.4)
GLUCOSE BLD-MCNC: 84 MG/DL (ref 70–99)
HCT VFR BLD AUTO: 40.9 %
HDLC SERPL-MCNC: 39 MG/DL (ref 40–59)
HGB BLD-MCNC: 13 G/DL
IMM GRANULOCYTES # BLD AUTO: 0.06 X10(3) UL (ref 0–1)
IMM GRANULOCYTES NFR BLD: 0.6 %
LDLC SERPL CALC-MCNC: 74 MG/DL (ref ?–100)
LYMPHOCYTES # BLD AUTO: 2.31 X10(3) UL (ref 1–4)
LYMPHOCYTES NFR BLD AUTO: 22.1 %
MCH RBC QN AUTO: 31.7 PG (ref 26–34)
MCHC RBC AUTO-ENTMCNC: 31.8 G/DL (ref 31–37)
MCV RBC AUTO: 99.8 FL
MONOCYTES # BLD AUTO: 1.01 X10(3) UL (ref 0.1–1)
MONOCYTES NFR BLD AUTO: 9.7 %
NEUTROPHILS # BLD AUTO: 6.81 X10 (3) UL (ref 1.5–7.7)
NEUTROPHILS # BLD AUTO: 6.81 X10(3) UL (ref 1.5–7.7)
NEUTROPHILS NFR BLD AUTO: 65.3 %
NONHDLC SERPL-MCNC: 89 MG/DL (ref ?–130)
OSMOLALITY SERPL CALC.SUM OF ELEC: 296 MOSM/KG (ref 275–295)
PLATELET # BLD AUTO: 193 10(3)UL (ref 150–450)
POTASSIUM SERPL-SCNC: 3.9 MMOL/L (ref 3.5–5.1)
PROT SERPL-MCNC: 7.1 G/DL (ref 6.4–8.2)
RBC # BLD AUTO: 4.1 X10(6)UL
SODIUM SERPL-SCNC: 143 MMOL/L (ref 136–145)
TRIGL SERPL-MCNC: 74 MG/DL (ref 30–149)
VLDLC SERPL CALC-MCNC: 11 MG/DL (ref 0–30)
WBC # BLD AUTO: 10.4 X10(3) UL (ref 4–11)

## 2023-02-02 PROCEDURE — 85025 COMPLETE CBC W/AUTO DIFF WBC: CPT

## 2023-02-02 PROCEDURE — 80053 COMPREHEN METABOLIC PANEL: CPT

## 2023-02-02 PROCEDURE — 36415 COLL VENOUS BLD VENIPUNCTURE: CPT

## 2023-02-02 PROCEDURE — 80061 LIPID PANEL: CPT

## 2023-02-07 ENCOUNTER — TELEPHONE (OUTPATIENT)
Dept: NEUROLOGY | Age: 73
End: 2023-02-07

## 2023-02-09 ENCOUNTER — APPOINTMENT (OUTPATIENT)
Dept: NEUROLOGY | Age: 73
End: 2023-02-09

## 2023-02-21 ENCOUNTER — PATIENT OUTREACH (OUTPATIENT)
Dept: CASE MANAGEMENT | Age: 73
End: 2023-02-21

## 2023-03-07 ENCOUNTER — TELEPHONE (OUTPATIENT)
Dept: INTERNAL MEDICINE CLINIC | Facility: CLINIC | Age: 73
End: 2023-03-07

## 2023-03-07 NOTE — TELEPHONE ENCOUNTER
Davi at [facility] notified [facility] will follow and sign orders. Davi verbalizes understanding.

## 2023-03-08 ENCOUNTER — TELEPHONE (OUTPATIENT)
Dept: CASE MANAGEMENT | Age: 73
End: 2023-03-08

## 2023-03-08 ENCOUNTER — PATIENT OUTREACH (OUTPATIENT)
Dept: CASE MANAGEMENT | Age: 73
End: 2023-03-08

## 2023-03-08 DIAGNOSIS — I10 PRIMARY HYPERTENSION: ICD-10-CM

## 2023-03-08 DIAGNOSIS — M06.041 RHEUMATOID ARTHRITIS INVOLVING BOTH HANDS WITH NEGATIVE RHEUMATOID FACTOR (HCC): ICD-10-CM

## 2023-03-08 DIAGNOSIS — F01.50 VASCULAR DEMENTIA WITHOUT BEHAVIORAL DISTURBANCE (HCC): ICD-10-CM

## 2023-03-08 DIAGNOSIS — K76.0 STEATOSIS OF LIVER: ICD-10-CM

## 2023-03-08 DIAGNOSIS — M06.042 RHEUMATOID ARTHRITIS INVOLVING BOTH HANDS WITH NEGATIVE RHEUMATOID FACTOR (HCC): ICD-10-CM

## 2023-03-08 DIAGNOSIS — Z95.5 HISTORY OF CORONARY ARTERY STENT PLACEMENT: ICD-10-CM

## 2023-03-08 DIAGNOSIS — E78.00 ELEVATED CHOLESTEROL: ICD-10-CM

## 2023-03-08 DIAGNOSIS — M51.26 HERNIATED LUMBAR INTERVERTEBRAL DISC: ICD-10-CM

## 2023-03-08 DIAGNOSIS — I25.10 CORONARY ARTERY DISEASE INVOLVING NATIVE CORONARY ARTERY OF NATIVE HEART WITHOUT ANGINA PECTORIS: ICD-10-CM

## 2023-03-08 DIAGNOSIS — M65.4 DE QUERVAIN'S DISEASE (TENOSYNOVITIS): ICD-10-CM

## 2023-03-08 DIAGNOSIS — I25.2 H/O NON-ST ELEVATION MYOCARDIAL INFARCTION (NSTEMI): ICD-10-CM

## 2023-03-08 DIAGNOSIS — G47.33 OBSTRUCTIVE SLEEP APNEA: ICD-10-CM

## 2023-03-08 DIAGNOSIS — R26.81 GAIT INSTABILITY: ICD-10-CM

## 2023-03-08 DIAGNOSIS — Z95.1 HX OF CABG: ICD-10-CM

## 2023-03-08 DIAGNOSIS — M19.049 CMC ARTHRITIS: ICD-10-CM

## 2023-03-08 RX ORDER — VANCOMYCIN HYDROCHLORIDE 125 MG/1
125 CAPSULE ORAL 4 TIMES DAILY
COMMUNITY
Start: 2023-03-06

## 2023-03-08 NOTE — TELEPHONE ENCOUNTER
Patient has not called urology- prescribed from urology she will follow up with their office on management. Advised I would check notes from hospitalization to see if any reason and unable to locate supporting documentation on need to discontinue. She will check with urology. Patient previously called the hospital in [place] and they said to call your PCP so this is why she was calling us today.

## 2023-03-08 NOTE — TELEPHONE ENCOUNTER
Triage: Good Day :)     Pt's wife Ana Rosa Christie (HIPPA verified) states pt was in ER [facility] on 3/4/23 for diarrhea. He was diagnosed with C-Diff and sent home with Vancomycin. The physician also stated on discharge papers for pt to stop Doxazosin. [employer] placed a call to inquire more about this but they told her pt would need to schedule an appointment with one of the physicians to discuss and that they couldn't discuss over the phone. She is asking if Dr. Barrington Florez can advise if pt should be off or continue? Notified I would contact the office and a nurse will call her back for further assessment. Understanding verbalized by pt. Please contact [employer] and thank you in advance!

## 2023-03-16 ENCOUNTER — TELEPHONE (OUTPATIENT)
Dept: CARDIOLOGY | Age: 73
End: 2023-03-16

## 2023-03-16 RX ORDER — ISOSORBIDE MONONITRATE 60 MG/1
TABLET, EXTENDED RELEASE ORAL
Qty: 90 TABLET | Refills: 11 | Status: CANCELLED | OUTPATIENT
Start: 2023-03-16

## 2023-03-17 RX ORDER — ISOSORBIDE MONONITRATE 60 MG/1
60 TABLET, EXTENDED RELEASE ORAL DAILY
Qty: 60 TABLET | Refills: 0 | Status: SHIPPED | OUTPATIENT
Start: 2023-03-17 | End: 2023-03-30 | Stop reason: DRUGHIGH

## 2023-03-28 ENCOUNTER — TELEPHONE (OUTPATIENT)
Dept: INTERNAL MEDICINE CLINIC | Facility: CLINIC | Age: 73
End: 2023-03-28

## 2023-03-28 DIAGNOSIS — E78.2 MIXED HYPERLIPIDEMIA: Primary | ICD-10-CM

## 2023-03-28 NOTE — TELEPHONE ENCOUNTER
Paperwork was received from [facility]. It was placed on [facility] desk for review and signature.

## 2023-03-30 ENCOUNTER — OFFICE VISIT (OUTPATIENT)
Dept: CARDIOLOGY | Age: 73
End: 2023-03-30
Attending: INTERNAL MEDICINE

## 2023-03-30 VITALS
DIASTOLIC BLOOD PRESSURE: 71 MMHG | RESPIRATION RATE: 16 BRPM | HEART RATE: 63 BPM | WEIGHT: 187.39 LBS | BODY MASS INDEX: 30.12 KG/M2 | SYSTOLIC BLOOD PRESSURE: 125 MMHG | OXYGEN SATURATION: 99 % | TEMPERATURE: 97.7 F | HEIGHT: 66 IN

## 2023-03-30 DIAGNOSIS — I10 BENIGN ESSENTIAL HYPERTENSION: Chronic | ICD-10-CM

## 2023-03-30 DIAGNOSIS — I20.89 CHRONIC STABLE ANGINA: ICD-10-CM

## 2023-03-30 DIAGNOSIS — Z95.1 HX OF CABG: Chronic | ICD-10-CM

## 2023-03-30 DIAGNOSIS — E78.2 MIXED HYPERLIPIDEMIA: Primary | Chronic | ICD-10-CM

## 2023-03-30 DIAGNOSIS — Z95.5 S/P PRIMARY ANGIOPLASTY WITH CORONARY STENT: Chronic | ICD-10-CM

## 2023-03-30 DIAGNOSIS — I25.118 ATHEROSCLEROSIS OF NATIVE CORONARY ARTERY OF NATIVE HEART WITH STABLE ANGINA PECTORIS (CMD): Chronic | ICD-10-CM

## 2023-03-30 DIAGNOSIS — K76.0 STEATOSIS OF LIVER: ICD-10-CM

## 2023-03-30 PROCEDURE — 99214 OFFICE O/P EST MOD 30 MIN: CPT | Performed by: INTERNAL MEDICINE

## 2023-03-30 PROCEDURE — 99211 OFF/OP EST MAY X REQ PHY/QHP: CPT

## 2023-03-30 RX ORDER — ISOSORBIDE MONONITRATE 60 MG/1
60 TABLET, EXTENDED RELEASE ORAL 2 TIMES DAILY
Qty: 180 TABLET | Refills: 3 | Status: SHIPPED | OUTPATIENT
Start: 2023-03-30

## 2023-03-30 RX ORDER — NITROGLYCERIN 0.4 MG/1
0.4 TABLET SUBLINGUAL EVERY 5 MIN PRN
Qty: 25 TABLET | Refills: 3 | Status: SHIPPED | OUTPATIENT
Start: 2023-03-30

## 2023-03-30 RX ORDER — METOPROLOL SUCCINATE 50 MG/1
50 TABLET, EXTENDED RELEASE ORAL 2 TIMES DAILY
Qty: 180 TABLET | Refills: 3 | Status: SHIPPED | OUTPATIENT
Start: 2023-03-30

## 2023-03-30 RX ORDER — DICYCLOMINE HYDROCHLORIDE 10 MG/1
10 CAPSULE ORAL PRN
COMMUNITY
Start: 2023-03-15

## 2023-03-30 RX ORDER — PRAVASTATIN SODIUM 80 MG/1
80 TABLET ORAL NIGHTLY
Qty: 90 TABLET | Refills: 3 | Status: SHIPPED | OUTPATIENT
Start: 2023-03-30

## 2023-03-30 ASSESSMENT — ENCOUNTER SYMPTOMS
CHILLS: 0
WEIGHT GAIN: 0
WEIGHT LOSS: 0
HEMATOCHEZIA: 0
ALLERGIC/IMMUNOLOGIC COMMENTS: NO NEW FOOD ALLERGIES
FEVER: 0
COUGH: 0
HEMOPTYSIS: 0
BRUISES/BLEEDS EASILY: 0
SUSPICIOUS LESIONS: 0

## 2023-04-06 ENCOUNTER — TELEPHONE (OUTPATIENT)
Dept: INTERNAL MEDICINE CLINIC | Facility: CLINIC | Age: 73
End: 2023-04-06

## 2023-04-06 NOTE — TELEPHONE ENCOUNTER
Paperwork was received from [employer]. It was placed on [facility] desk for review and signature.

## 2023-04-07 ENCOUNTER — PATIENT OUTREACH (OUTPATIENT)
Dept: CASE MANAGEMENT | Age: 73
End: 2023-04-07

## 2023-04-07 DIAGNOSIS — M06.042 RHEUMATOID ARTHRITIS INVOLVING BOTH HANDS WITH NEGATIVE RHEUMATOID FACTOR (HCC): ICD-10-CM

## 2023-04-07 DIAGNOSIS — M06.041 RHEUMATOID ARTHRITIS INVOLVING BOTH HANDS WITH NEGATIVE RHEUMATOID FACTOR (HCC): ICD-10-CM

## 2023-04-07 DIAGNOSIS — I10 PRIMARY HYPERTENSION: ICD-10-CM

## 2023-04-07 DIAGNOSIS — Z95.1 HX OF CABG: ICD-10-CM

## 2023-04-07 DIAGNOSIS — I25.10 CORONARY ARTERY DISEASE INVOLVING NATIVE CORONARY ARTERY OF NATIVE HEART WITHOUT ANGINA PECTORIS: ICD-10-CM

## 2023-04-07 DIAGNOSIS — M19.049 CMC ARTHRITIS: ICD-10-CM

## 2023-04-07 DIAGNOSIS — F01.50 VASCULAR DEMENTIA WITHOUT BEHAVIORAL DISTURBANCE (HCC): ICD-10-CM

## 2023-04-07 DIAGNOSIS — G47.33 OBSTRUCTIVE SLEEP APNEA: ICD-10-CM

## 2023-04-07 DIAGNOSIS — M65.4 DE QUERVAIN'S DISEASE (TENOSYNOVITIS): ICD-10-CM

## 2023-04-07 DIAGNOSIS — I25.2 H/O NON-ST ELEVATION MYOCARDIAL INFARCTION (NSTEMI): ICD-10-CM

## 2023-04-07 DIAGNOSIS — Z95.5 HISTORY OF CORONARY ARTERY STENT PLACEMENT: ICD-10-CM

## 2023-04-07 DIAGNOSIS — E78.00 ELEVATED CHOLESTEROL: ICD-10-CM

## 2023-04-07 DIAGNOSIS — K76.0 STEATOSIS OF LIVER: ICD-10-CM

## 2023-04-07 RX ORDER — DICYCLOMINE HYDROCHLORIDE 10 MG/1
10 CAPSULE ORAL
COMMUNITY
Start: 2023-03-15

## 2023-04-25 ENCOUNTER — TELEPHONE (OUTPATIENT)
Dept: NEUROLOGY | Age: 73
End: 2023-04-25

## 2023-04-25 RX ORDER — PANTOPRAZOLE SODIUM 40 MG/1
40 TABLET, DELAYED RELEASE ORAL DAILY
COMMUNITY
Start: 2023-01-24

## 2023-04-25 RX ORDER — TRAMADOL HYDROCHLORIDE 50 MG/1
50 TABLET ORAL EVERY 8 HOURS PRN
COMMUNITY
Start: 2022-12-13

## 2023-04-25 RX ORDER — DONEPEZIL HYDROCHLORIDE 10 MG/1
10 TABLET, FILM COATED ORAL DAILY
Qty: 30 TABLET | Refills: 3 | Status: SHIPPED | OUTPATIENT
Start: 2023-04-25 | End: 2023-11-10 | Stop reason: SDUPTHER

## 2023-04-25 RX ORDER — DOXAZOSIN 8 MG/1
8 TABLET ORAL NIGHTLY
COMMUNITY
Start: 2022-12-07

## 2023-04-25 RX ORDER — CLONAZEPAM 1 MG/1
1 TABLET ORAL NIGHTLY
COMMUNITY
Start: 2023-01-26

## 2023-04-25 RX ORDER — CETIRIZINE HYDROCHLORIDE 10 MG/1
10 TABLET ORAL DAILY PRN
COMMUNITY

## 2023-05-04 ENCOUNTER — OFFICE VISIT (OUTPATIENT)
Dept: INTERNAL MEDICINE CLINIC | Facility: CLINIC | Age: 73
End: 2023-05-04
Payer: MEDICARE

## 2023-05-04 ENCOUNTER — TELEPHONE (OUTPATIENT)
Dept: INTERNAL MEDICINE CLINIC | Facility: CLINIC | Age: 73
End: 2023-05-04

## 2023-05-04 VITALS
TEMPERATURE: 97 F | WEIGHT: 192 LBS | RESPIRATION RATE: 16 BRPM | OXYGEN SATURATION: 99 % | HEART RATE: 60 BPM | DIASTOLIC BLOOD PRESSURE: 74 MMHG | SYSTOLIC BLOOD PRESSURE: 122 MMHG | BODY MASS INDEX: 32 KG/M2

## 2023-05-04 DIAGNOSIS — Z12.11 SCREENING FOR COLON CANCER: ICD-10-CM

## 2023-05-04 DIAGNOSIS — Z86.73 HISTORY OF LACUNAR CEREBROVASCULAR ACCIDENT: ICD-10-CM

## 2023-05-04 DIAGNOSIS — I10 PRIMARY HYPERTENSION: ICD-10-CM

## 2023-05-04 DIAGNOSIS — N40.1 BENIGN PROSTATIC HYPERPLASIA WITH NOCTURIA: ICD-10-CM

## 2023-05-04 DIAGNOSIS — D69.6 THROMBOCYTOPENIA (HCC): ICD-10-CM

## 2023-05-04 DIAGNOSIS — M47.812 SPONDYLOSIS OF CERVICAL REGION WITHOUT MYELOPATHY OR RADICULOPATHY: ICD-10-CM

## 2023-05-04 DIAGNOSIS — M06.9 RHEUMATOID ARTHRITIS, INVOLVING UNSPECIFIED SITE, UNSPECIFIED WHETHER RHEUMATOID FACTOR PRESENT (HCC): ICD-10-CM

## 2023-05-04 DIAGNOSIS — R26.81 GAIT INSTABILITY: ICD-10-CM

## 2023-05-04 DIAGNOSIS — E78.00 HYPERCHOLESTEREMIA: ICD-10-CM

## 2023-05-04 DIAGNOSIS — R53.1 GENERALIZED WEAKNESS: ICD-10-CM

## 2023-05-04 DIAGNOSIS — I25.10 CORONARY ARTERY DISEASE INVOLVING NATIVE CORONARY ARTERY OF NATIVE HEART WITHOUT ANGINA PECTORIS: ICD-10-CM

## 2023-05-04 DIAGNOSIS — R97.20 ELEVATED PSA: ICD-10-CM

## 2023-05-04 DIAGNOSIS — I25.2 H/O NON-ST ELEVATION MYOCARDIAL INFARCTION (NSTEMI): ICD-10-CM

## 2023-05-04 DIAGNOSIS — Z86.19 HISTORY OF CLOSTRIDIOIDES DIFFICILE COLITIS: ICD-10-CM

## 2023-05-04 DIAGNOSIS — R47.01 EXPRESSIVE APHASIA: ICD-10-CM

## 2023-05-04 DIAGNOSIS — K76.0 HEPATIC STEATOSIS: ICD-10-CM

## 2023-05-04 DIAGNOSIS — R35.1 BENIGN PROSTATIC HYPERPLASIA WITH NOCTURIA: ICD-10-CM

## 2023-05-04 DIAGNOSIS — G47.33 OSA ON CPAP: ICD-10-CM

## 2023-05-04 DIAGNOSIS — Z95.5 HISTORY OF CORONARY ARTERY STENT PLACEMENT: ICD-10-CM

## 2023-05-04 DIAGNOSIS — M43.06 LUMBAR SPONDYLOLYSIS: ICD-10-CM

## 2023-05-04 DIAGNOSIS — Z86.19 HISTORY OF HELICOBACTER PYLORI INFECTION: ICD-10-CM

## 2023-05-04 DIAGNOSIS — Z00.00 ENCOUNTER FOR ANNUAL HEALTH EXAMINATION: Primary | ICD-10-CM

## 2023-05-04 DIAGNOSIS — Z99.89 OSA ON CPAP: ICD-10-CM

## 2023-05-04 DIAGNOSIS — R19.7 DIARRHEA, UNSPECIFIED TYPE: ICD-10-CM

## 2023-05-04 DIAGNOSIS — F01.50 VASCULAR DEMENTIA WITHOUT BEHAVIORAL DISTURBANCE (HCC): ICD-10-CM

## 2023-05-04 DIAGNOSIS — Z95.1 HX OF CABG: ICD-10-CM

## 2023-05-04 DIAGNOSIS — K21.9 GASTROESOPHAGEAL REFLUX DISEASE, UNSPECIFIED WHETHER ESOPHAGITIS PRESENT: ICD-10-CM

## 2023-05-04 PROBLEM — F03.918 DEMENTIA WITH BEHAVIORAL DISTURBANCE (HCC): Status: ACTIVE | Noted: 2023-05-04

## 2023-05-04 PROBLEM — Z98.890 S/P COLONOSCOPY: Status: RESOLVED | Noted: 2018-05-31 | Resolved: 2023-05-04

## 2023-05-04 PROBLEM — F03.918 DEMENTIA WITH BEHAVIORAL DISTURBANCE (HCC): Status: RESOLVED | Noted: 2023-05-04 | Resolved: 2023-05-04

## 2023-05-04 PROBLEM — Z98.61 S/P PTCA (PERCUTANEOUS TRANSLUMINAL CORONARY ANGIOPLASTY): Status: RESOLVED | Noted: 2018-09-25 | Resolved: 2023-05-04

## 2023-05-04 PROBLEM — R47.89 WORD FINDING DIFFICULTY: Status: RESOLVED | Noted: 2019-02-26 | Resolved: 2023-05-04

## 2023-05-04 PROBLEM — U07.1 COVID-19: Status: RESOLVED | Noted: 2022-01-10 | Resolved: 2023-05-04

## 2023-05-04 PROBLEM — M06.042 RHEUMATOID ARTHRITIS INVOLVING BOTH HANDS WITH NEGATIVE RHEUMATOID FACTOR (HCC): Status: RESOLVED | Noted: 2018-11-01 | Resolved: 2023-05-04

## 2023-05-04 PROBLEM — M06.041 RHEUMATOID ARTHRITIS INVOLVING BOTH HANDS WITH NEGATIVE RHEUMATOID FACTOR (HCC): Status: RESOLVED | Noted: 2018-11-01 | Resolved: 2023-05-04

## 2023-05-04 PROBLEM — I63.81 LACUNAR INFARCTION (HCC): Status: RESOLVED | Noted: 2018-05-08 | Resolved: 2023-05-04

## 2023-05-04 PROBLEM — Z79.899 HIGH RISK MEDICATION USE: Status: RESOLVED | Noted: 2018-11-01 | Resolved: 2023-05-04

## 2023-05-04 PROBLEM — M65.4 DE QUERVAIN'S DISEASE (TENOSYNOVITIS): Status: RESOLVED | Noted: 2018-12-18 | Resolved: 2023-05-04

## 2023-05-04 PROBLEM — U07.1 COVID-19 VIRUS INFECTION: Status: RESOLVED | Noted: 2022-01-21 | Resolved: 2023-05-04

## 2023-05-04 PROBLEM — J42 CHRONIC BRONCHITIS, UNSPECIFIED CHRONIC BRONCHITIS TYPE (HCC): Status: ACTIVE | Noted: 2023-05-04

## 2023-05-04 PROCEDURE — 1125F AMNT PAIN NOTED PAIN PRSNT: CPT | Performed by: FAMILY MEDICINE

## 2023-05-04 PROCEDURE — 99214 OFFICE O/P EST MOD 30 MIN: CPT | Performed by: FAMILY MEDICINE

## 2023-05-04 PROCEDURE — G0439 PPPS, SUBSEQ VISIT: HCPCS | Performed by: FAMILY MEDICINE

## 2023-05-04 NOTE — TELEPHONE ENCOUNTER
Signed paperwork was signed and faxed to [facility] on 4/27/23. Confirmation of receipt was received and paperwork was sent to [facility].

## 2023-05-05 ENCOUNTER — PATIENT OUTREACH (OUTPATIENT)
Dept: CASE MANAGEMENT | Age: 73
End: 2023-05-05

## 2023-05-05 DIAGNOSIS — I10 PRIMARY HYPERTENSION: ICD-10-CM

## 2023-05-05 DIAGNOSIS — I25.2 H/O NON-ST ELEVATION MYOCARDIAL INFARCTION (NSTEMI): ICD-10-CM

## 2023-05-05 DIAGNOSIS — F01.50 VASCULAR DEMENTIA WITHOUT BEHAVIORAL DISTURBANCE (HCC): ICD-10-CM

## 2023-05-05 DIAGNOSIS — K76.0 HEPATIC STEATOSIS: ICD-10-CM

## 2023-05-05 DIAGNOSIS — Z95.5 HISTORY OF CORONARY ARTERY STENT PLACEMENT: ICD-10-CM

## 2023-05-05 DIAGNOSIS — K21.9 GASTROESOPHAGEAL REFLUX DISEASE, UNSPECIFIED WHETHER ESOPHAGITIS PRESENT: ICD-10-CM

## 2023-05-05 DIAGNOSIS — M06.9 RHEUMATOID ARTHRITIS, INVOLVING UNSPECIFIED SITE, UNSPECIFIED WHETHER RHEUMATOID FACTOR PRESENT (HCC): ICD-10-CM

## 2023-05-05 DIAGNOSIS — E78.00 HYPERCHOLESTEREMIA: ICD-10-CM

## 2023-05-05 DIAGNOSIS — I25.10 CORONARY ARTERY DISEASE INVOLVING NATIVE CORONARY ARTERY OF NATIVE HEART WITHOUT ANGINA PECTORIS: ICD-10-CM

## 2023-05-05 DIAGNOSIS — Z99.89 OSA ON CPAP: ICD-10-CM

## 2023-05-05 DIAGNOSIS — Z95.1 HX OF CABG: ICD-10-CM

## 2023-05-05 DIAGNOSIS — G47.33 OSA ON CPAP: ICD-10-CM

## 2023-05-05 RX ORDER — DICYCLOMINE HYDROCHLORIDE 10 MG/1
10 CAPSULE ORAL
COMMUNITY
End: 2023-05-05

## 2023-05-05 RX ORDER — DICYCLOMINE HYDROCHLORIDE 10 MG/1
10 CAPSULE ORAL
Qty: 120 CAPSULE | Refills: 0 | OUTPATIENT
Start: 2023-05-05

## 2023-05-05 NOTE — TELEPHONE ENCOUNTER
Triage: Good Day     Pt requesting refill on Clonazepam sent to [facility] on file. Pt also in need of Diclocymine refill and states the pharmacy notified him it was denied and he is wondering if this was in error. Notified pt I would contact the office and a nurse will call him back if further assessment is needed. Understanding verbalized by pt. LOV 5/4/23     Please contact pt and thank you in advance! Refills pended if appropriate to fill.

## 2023-05-05 NOTE — TELEPHONE ENCOUNTER
LOV 5/4/23 with [address] Pended Dicyclomine script for 1 month supply, adjust with refills if needed.

## 2023-05-06 ENCOUNTER — TELEPHONE (OUTPATIENT)
Dept: INTERNAL MEDICINE CLINIC | Facility: CLINIC | Age: 73
End: 2023-05-06

## 2023-05-06 RX ORDER — CLONAZEPAM 1 MG/1
1 TABLET ORAL NIGHTLY PRN
Qty: 30 TABLET | Refills: 0 | Status: SHIPPED | OUTPATIENT
Start: 2023-05-06 | End: 2023-05-09

## 2023-05-06 RX ORDER — DICYCLOMINE HYDROCHLORIDE 10 MG/1
10 CAPSULE ORAL
Qty: 30 CAPSULE | Refills: 0 | Status: SHIPPED | OUTPATIENT
Start: 2023-05-06

## 2023-05-06 NOTE — TELEPHONE ENCOUNTER
Received call from wife, Pato Fong, this weekend needing refills. Sent 30 clonazepam and dicyclomine.

## 2023-05-08 PROBLEM — J42 CHRONIC BRONCHITIS, UNSPECIFIED CHRONIC BRONCHITIS TYPE (HCC): Status: RESOLVED | Noted: 2023-05-04 | Resolved: 2023-05-08

## 2023-05-08 NOTE — TELEPHONE ENCOUNTER
Pt is calling to see why [facility] only sent 30 day supply  clonazePAM 1 MG Oral Tab 30 tablet 0 5/6/2023     Sig - Route: Take 1 tablet (1 mg total) by mouth nightly as needed for Anxiety. - Oral    Sent to pharmacy as: clonazePAM 1 MG Oral Tablet (KlonoPIN)       he pays more if its 30 needs to be sent as a 90 day.

## 2023-05-11 ENCOUNTER — TELEPHONE (OUTPATIENT)
Dept: CARDIOLOGY | Age: 73
End: 2023-05-11

## 2023-05-23 ENCOUNTER — TELEPHONE (OUTPATIENT)
Dept: INTERNAL MEDICINE CLINIC | Facility: CLINIC | Age: 73
End: 2023-05-23

## 2023-05-23 RX ORDER — PANTOPRAZOLE SODIUM 40 MG/1
TABLET, DELAYED RELEASE ORAL
Qty: 90 TABLET | Refills: 0 | OUTPATIENT
Start: 2023-05-23

## 2023-05-23 NOTE — TELEPHONE ENCOUNTER
Paperwork for [facility] was received. Several orders. They were placed on [facility] desk for review and signature.

## 2023-05-25 RX ORDER — PANTOPRAZOLE SODIUM 40 MG/1
40 TABLET, DELAYED RELEASE ORAL
Qty: 90 TABLET | Refills: 0 | Status: SHIPPED | OUTPATIENT
Start: 2023-05-25

## 2023-06-08 ENCOUNTER — PATIENT OUTREACH (OUTPATIENT)
Dept: CASE MANAGEMENT | Age: 73
End: 2023-06-08

## 2023-06-08 DIAGNOSIS — Z95.5 HISTORY OF CORONARY ARTERY STENT PLACEMENT: ICD-10-CM

## 2023-06-08 DIAGNOSIS — Z95.1 HX OF CABG: ICD-10-CM

## 2023-06-08 DIAGNOSIS — I10 PRIMARY HYPERTENSION: ICD-10-CM

## 2023-06-08 DIAGNOSIS — F01.50 VASCULAR DEMENTIA WITHOUT BEHAVIORAL DISTURBANCE (HCC): ICD-10-CM

## 2023-06-08 DIAGNOSIS — I25.2 H/O NON-ST ELEVATION MYOCARDIAL INFARCTION (NSTEMI): ICD-10-CM

## 2023-06-08 DIAGNOSIS — E78.00 HYPERCHOLESTEREMIA: ICD-10-CM

## 2023-06-08 DIAGNOSIS — I25.10 CORONARY ARTERY DISEASE INVOLVING NATIVE CORONARY ARTERY OF NATIVE HEART WITHOUT ANGINA PECTORIS: ICD-10-CM

## 2023-06-08 DIAGNOSIS — Z86.73 HISTORY OF LACUNAR CEREBROVASCULAR ACCIDENT: ICD-10-CM

## 2023-06-08 DIAGNOSIS — K76.0 HEPATIC STEATOSIS: ICD-10-CM

## 2023-06-08 DIAGNOSIS — Z99.89 OSA ON CPAP: ICD-10-CM

## 2023-06-08 DIAGNOSIS — G47.33 OSA ON CPAP: ICD-10-CM

## 2023-06-08 DIAGNOSIS — K21.9 GASTROESOPHAGEAL REFLUX DISEASE, UNSPECIFIED WHETHER ESOPHAGITIS PRESENT: ICD-10-CM

## 2023-06-08 DIAGNOSIS — M06.9 RHEUMATOID ARTHRITIS, INVOLVING UNSPECIFIED SITE, UNSPECIFIED WHETHER RHEUMATOID FACTOR PRESENT (HCC): ICD-10-CM

## 2023-06-15 ENCOUNTER — APPOINTMENT (OUTPATIENT)
Dept: CARDIOLOGY | Age: 73
End: 2023-06-15
Attending: INTERNAL MEDICINE

## 2023-06-19 RX ORDER — DICYCLOMINE HYDROCHLORIDE 10 MG/1
CAPSULE ORAL
Qty: 30 CAPSULE | Refills: 0 | Status: SHIPPED | OUTPATIENT
Start: 2023-06-19

## 2023-06-23 ENCOUNTER — PATIENT OUTREACH (OUTPATIENT)
Dept: CASE MANAGEMENT | Age: 73
End: 2023-06-23

## 2023-06-23 ENCOUNTER — TELEPHONE (OUTPATIENT)
Dept: CASE MANAGEMENT | Age: 73
End: 2023-06-23

## 2023-06-23 NOTE — PROGRESS NOTES
Berry Moffett states pt received a bill from lab work (PSA) he had done recently for Dr. Anny Gann. She called her insurance they said it was because pt had two within a year and they only allow one. Berry Moffett then notified them that the second was due to the first being elevated they encouraged her to call the office and having diagnosis corrected because it was entered as screening and not diagnostic. Notified I would contact the office and someone will call her back for further assistance. Understanding verbalized by pt.     Telephone encounter sent to [facility]    Total time spent with patient including chart review: 6  Time spent with patient this month: 30    Total time spent with communication and chart review this month to date: 27

## 2023-06-29 ENCOUNTER — TELEPHONE (OUTPATIENT)
Dept: INTERNAL MEDICINE CLINIC | Facility: CLINIC | Age: 73
End: 2023-06-29

## 2023-07-06 ENCOUNTER — PATIENT OUTREACH (OUTPATIENT)
Dept: CASE MANAGEMENT | Age: 73
End: 2023-07-06

## 2023-07-06 ENCOUNTER — APPOINTMENT (OUTPATIENT)
Dept: CARDIOLOGY | Age: 73
End: 2023-07-06
Attending: INTERNAL MEDICINE

## 2023-07-06 DIAGNOSIS — M06.9 RHEUMATOID ARTHRITIS, INVOLVING UNSPECIFIED SITE, UNSPECIFIED WHETHER RHEUMATOID FACTOR PRESENT (HCC): ICD-10-CM

## 2023-07-06 DIAGNOSIS — E78.00 HYPERCHOLESTEREMIA: ICD-10-CM

## 2023-07-06 DIAGNOSIS — F01.50 VASCULAR DEMENTIA WITHOUT BEHAVIORAL DISTURBANCE (HCC): ICD-10-CM

## 2023-07-06 DIAGNOSIS — I25.2 H/O NON-ST ELEVATION MYOCARDIAL INFARCTION (NSTEMI): Primary | ICD-10-CM

## 2023-07-06 DIAGNOSIS — G47.33 OSA ON CPAP: ICD-10-CM

## 2023-07-06 DIAGNOSIS — Z95.5 HISTORY OF CORONARY ARTERY STENT PLACEMENT: ICD-10-CM

## 2023-07-06 DIAGNOSIS — K21.9 GASTROESOPHAGEAL REFLUX DISEASE, UNSPECIFIED WHETHER ESOPHAGITIS PRESENT: ICD-10-CM

## 2023-07-06 DIAGNOSIS — I10 PRIMARY HYPERTENSION: ICD-10-CM

## 2023-07-06 DIAGNOSIS — Z95.1 HX OF CABG: ICD-10-CM

## 2023-07-06 DIAGNOSIS — I25.10 CORONARY ARTERY DISEASE INVOLVING NATIVE CORONARY ARTERY OF NATIVE HEART WITHOUT ANGINA PECTORIS: ICD-10-CM

## 2023-07-06 NOTE — PROGRESS NOTES
Spoke to [employer] wife Ana Rosa Christie (HIPAA verified) for [facility]. Updates to patient care team/comments: UTD. Patient reported changes in medications: together we reviewed with no updates. Med Adherence  Comment: pt reports taking all medications as prescribed. Health Maintenance:   Reviewed with wife. Colorectal Cancer Screening due on 04/30/2020- reminded   Zoster Vaccines(1 of 2) due on 03/08/2024  Influenza Vaccine(1) due on 10/01/2023  PSA due on 02/02/2024  Annual Physical due on 05/04/2024  Annual Depression Screening Completed  Fall Risk Screening (Annual) Completed  Pneumococcal Vaccine: 65+ Years Completed  COVID-19 Vaccine Discontinued    Patient updates/concerns:  Listened and provided support. Dementia   No worse. Blood pressure  Does not check blood pressures at home. Diet: fairly good at times he can lack appetite. For awhile he didn't feel like eating but his appetite has come back and he has been trying to create a healthy diet. Tries not skipping meals but lack of appetite can play a factor. Does have at lease 2 smaller meals a day. Discussed diet and nutrition along with the importance of staying complaint. Doing better with diet has been trying to make sure he has at least two meals and a snack. [facility]  Following Dr. Norm Paz. Goals/Action Plan: Active goal from previous outreach: pt would like to work on not skipping meals. Eat at least 2 balanced meals with a snack a day. Patient reported progress towards goals: progressing. Patient Reported Barriers and Concerns: lacks appetite so he tends to skip meals.                     - Plan for overcoming barriers: plans on preparing smaller portions so he is getting his nutrients. Care Managers Interventions: discussed the importance of diet and nutrition for good health and wellbeing. Encouraged patient:   Self care: Take the time to do the things you love.    Nutrition:  Good nutrition helps us to maintain our weight, fight off infection, and help reduce the risk of developing other chronic issues. Physical activity:  Physical activity is important to help maintain independence and improve quality of life. Future Appointments:   Future Appointments   Date Time Provider Edison Dey   11/7/2023 11:20 AM Jamie Canela MD [facility]         Next Care Manager Follow Up Date: in 2 month. Reason For Follow Up: review progress and or barriers towards patient's goals. Time Spent This Encounter Total: 24 min medical record review, telephone communication, care plan updates where needed, education, goals, and action plan recreation/update. Provided acknowledgment and validation to patient's concerns.    Monthly Minute Total including today: 24  Physical assessment, complete health history, and need for CCM established by Cleve Ravi MD.

## 2023-07-18 ENCOUNTER — TELEPHONE (OUTPATIENT)
Dept: INTERNAL MEDICINE CLINIC | Facility: CLINIC | Age: 73
End: 2023-07-18

## 2023-07-18 NOTE — TELEPHONE ENCOUNTER
Received a missed appt note from [facility]. Placed in 's bin to review and sign.

## 2023-08-03 ENCOUNTER — PATIENT OUTREACH (OUTPATIENT)
Dept: CASE MANAGEMENT | Age: 73
End: 2023-08-03

## 2023-08-03 DIAGNOSIS — K21.9 GASTROESOPHAGEAL REFLUX DISEASE, UNSPECIFIED WHETHER ESOPHAGITIS PRESENT: ICD-10-CM

## 2023-08-03 DIAGNOSIS — Z95.1 HX OF CABG: ICD-10-CM

## 2023-08-03 DIAGNOSIS — F01.50 VASCULAR DEMENTIA WITHOUT BEHAVIORAL DISTURBANCE (HCC): ICD-10-CM

## 2023-08-03 DIAGNOSIS — Z95.5 HISTORY OF CORONARY ARTERY STENT PLACEMENT: ICD-10-CM

## 2023-08-03 DIAGNOSIS — M06.9 RHEUMATOID ARTHRITIS, INVOLVING UNSPECIFIED SITE, UNSPECIFIED WHETHER RHEUMATOID FACTOR PRESENT (HCC): ICD-10-CM

## 2023-08-03 DIAGNOSIS — I10 PRIMARY HYPERTENSION: ICD-10-CM

## 2023-08-03 DIAGNOSIS — G47.33 OSA ON CPAP: ICD-10-CM

## 2023-08-03 DIAGNOSIS — E78.00 HYPERCHOLESTEREMIA: Primary | ICD-10-CM

## 2023-08-03 DIAGNOSIS — I25.10 CORONARY ARTERY DISEASE INVOLVING NATIVE CORONARY ARTERY OF NATIVE HEART WITHOUT ANGINA PECTORIS: ICD-10-CM

## 2023-08-03 NOTE — PROGRESS NOTES
Spoke to Bryan for [facility]. Updates to patient care team/comments: UTD. Patient reported changes in medications: together we reviewed with no updates. Med Adherence  Comment: pt reports taking all medications as prescribed. Health Maintenance:   Reviewed with pt. Colorectal Cancer Screening due on 04/30/2020- reminded will discuss at visit with Dr. Avila Crawford he says he thought he was told he no longer needed this  Zoster Vaccines(1 of 2) due on 03/08/2024  Influenza Vaccine(1) due on 10/01/2023  PSA due on 02/02/2024  Annual Physical due on 05/04/2024  Annual Depression Screening Completed  Fall Risk Screening (Annual) Completed  Pneumococcal Vaccine: 65+ Years Completed  COVID-19 Vaccine Discontinued    Patient updates/concerns:   Listened to pt and wife provided support. [facility]  Following Dr. Rafaela Richard. Diet: fairly good at times he can lack appetite. For awhile he didn't feel like eating but his appetite has come back and he has been trying to create a healthy diet. Tries not skipping meals but lack of appetite can play a factor. Does have at lease 2 smaller meals a day. Discussed diet and nutrition along with the importance of staying complaint. Doing better with diet has been trying to make sure he has at least two meals and a snack. Dementia   No worse. Goals/Action Plan: Active goal from previous outreach: pt would like to work on not skipping meals. Eat at least 2 balanced meals with a snack a day. Patient reported progress towards goals: progressing. Patient Reported Barriers and Concerns: lacks appetite so he tends to skip meals.                     - Plan for overcoming barriers: plans on preparing smaller portions so he is getting his nutrients. Care Managers Interventions:   Encouraged patient:   Self care: Take the time to do the things you love.    Nutrition:  Good nutrition helps us to maintain our weight, fight off infection, and help reduce the risk of developing other chronic issues. Physical activity:  Physical activity is important to help maintain independence and improve quality of life. Future Appointments:   Future Appointments   Date Time Provider Edison Dey   11/7/2023 11:20 AM Jori Manzanares MD [facility]         Next Care Manager Follow Up Date: in 2 month. Reason For Follow Up: review progress and or barriers towards patient's goals. Time Spent This Encounter Total: 20 min medical record review, telephone communication, care plan updates where needed, education, goals, and action plan recreation/update. Provided acknowledgment and validation to patient's concerns.    Monthly Minute Total including today: 20  Physical assessment, complete health history, and need for CCM established by Mario Patel MD.

## 2023-08-10 RX ORDER — DICYCLOMINE HYDROCHLORIDE 10 MG/1
CAPSULE ORAL
Qty: 30 CAPSULE | Refills: 0 | Status: SHIPPED | OUTPATIENT
Start: 2023-08-10

## 2023-08-10 RX ORDER — TRAMADOL HYDROCHLORIDE 50 MG/1
50 TABLET ORAL EVERY 8 HOURS PRN
Qty: 60 TABLET | Refills: 0 | Status: SHIPPED | OUTPATIENT
Start: 2023-08-10

## 2023-08-10 NOTE — TELEPHONE ENCOUNTER
Patient states \"the shots for the back didn't help. \" Tramadol helps with the pain, takes about 2 pills a week.

## 2023-08-10 NOTE — TELEPHONE ENCOUNTER
Requested Prescriptions     Pending Prescriptions Disp Refills    TRAMADOL 50 MG Oral Tab [Pharmacy Med Name: TRAMADOL HCL 50 MG TABLET] 60 tablet 0     Sig: Take 1 tablet (50 mg total) by mouth every 8 (eight) hours as needed for Pain.        LOV: 5/4/23    LAST PHYSICAL: 5/4/23    LAST REFILL: tramadol-60-12/13/22    LAST LAB: 3/14/23    Your Appointments      Tuesday November 07, 2023 11:20 AM  Follow up - Extended with Lauren Dean MD  [address], [facility] ([facility] IM/FM [place]) [address] [facility]

## 2023-08-10 NOTE — TELEPHONE ENCOUNTER
PASSED per protocol, refill sent.   Last PE 5/4/23  Future Appointments   Date Time Provider Edison Dey   11/7/2023 11:20 AM Aiden Tian MD [facility]

## 2023-08-17 ENCOUNTER — TELEPHONE (OUTPATIENT)
Dept: NEUROLOGY | Age: 73
End: 2023-08-17

## 2023-08-17 ENCOUNTER — APPOINTMENT (OUTPATIENT)
Dept: CARDIOLOGY | Age: 73
End: 2023-08-17

## 2023-08-23 ENCOUNTER — TELEPHONE (OUTPATIENT)
Dept: CARDIOLOGY | Age: 73
End: 2023-08-23

## 2023-08-24 ENCOUNTER — APPOINTMENT (OUTPATIENT)
Dept: CARDIOLOGY | Age: 73
End: 2023-08-24
Attending: INTERNAL MEDICINE

## 2023-08-24 ENCOUNTER — APPOINTMENT (OUTPATIENT)
Dept: NEUROLOGY | Age: 73
End: 2023-08-24

## 2023-08-31 RX ORDER — DONEPEZIL HYDROCHLORIDE 10 MG/1
10 TABLET, FILM COATED ORAL DAILY
Qty: 30 TABLET | Refills: 0 | OUTPATIENT
Start: 2023-08-31

## 2023-08-31 RX ORDER — PANTOPRAZOLE SODIUM 40 MG/1
40 TABLET, DELAYED RELEASE ORAL
Qty: 90 TABLET | Refills: 0 | Status: SHIPPED | OUTPATIENT
Start: 2023-08-31

## 2023-09-01 RX ORDER — DONEPEZIL HYDROCHLORIDE 10 MG/1
10 TABLET, FILM COATED ORAL DAILY
Qty: 30 TABLET | Refills: 0 | OUTPATIENT
Start: 2023-09-01
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
## 2023-09-05 RX ORDER — DONEPEZIL HYDROCHLORIDE 10 MG/1
10 TABLET, ORALLY DISINTEGRATING ORAL NIGHTLY
Qty: 90 TABLET | Refills: 1 | Status: SHIPPED | OUTPATIENT
Start: 2023-09-05
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
## 2023-09-06 ENCOUNTER — PATIENT OUTREACH (OUTPATIENT)
Dept: CASE MANAGEMENT | Age: 73
End: 2023-09-06

## 2023-09-06 DIAGNOSIS — Z95.5 HISTORY OF CORONARY ARTERY STENT PLACEMENT: ICD-10-CM

## 2023-09-06 DIAGNOSIS — E78.00 HYPERCHOLESTEREMIA: Primary | ICD-10-CM

## 2023-09-06 DIAGNOSIS — G47.33 OSA ON CPAP: ICD-10-CM

## 2023-09-06 DIAGNOSIS — F01.50 VASCULAR DEMENTIA WITHOUT BEHAVIORAL DISTURBANCE (HCC): ICD-10-CM

## 2023-09-06 DIAGNOSIS — I10 PRIMARY HYPERTENSION: ICD-10-CM

## 2023-09-06 DIAGNOSIS — K21.9 GASTROESOPHAGEAL REFLUX DISEASE, UNSPECIFIED WHETHER ESOPHAGITIS PRESENT: ICD-10-CM

## 2023-09-06 DIAGNOSIS — M06.9 RHEUMATOID ARTHRITIS, INVOLVING UNSPECIFIED SITE, UNSPECIFIED WHETHER RHEUMATOID FACTOR PRESENT (HCC): ICD-10-CM

## 2023-09-06 DIAGNOSIS — Z95.1 HX OF CABG: ICD-10-CM

## 2023-09-06 DIAGNOSIS — I25.10 CORONARY ARTERY DISEASE INVOLVING NATIVE CORONARY ARTERY OF NATIVE HEART WITHOUT ANGINA PECTORIS: ICD-10-CM
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
## 2023-09-07 ENCOUNTER — TELEPHONE (OUTPATIENT)
Dept: CASE MANAGEMENT | Age: 73
End: 2023-09-07

## 2023-09-07 NOTE — TELEPHONE ENCOUNTER
Good Day Sully Lutz :)     Pt's wife received another bill stating she currently owes $223 for PSA done 2/2/23. Account # [de-identified]. Below is the response from previous bill she had received and you had this resubmitted. I discussed with Ren Mortensen this could have been sent out before it had been corrected but she is asking for reassurance that this bill was corrected because she does not want to it going to collections. Notified Ren Mortensen I would reach out to you and we will call her back with update. Understanding verbalized by Ren Mortensen. James Ahuja    6/30/23  1:24 PM  Note  Will recode with Dx R97.2 and faxed to billing to resubmit to insurance. Spoke with patients wife and advised her of the above. Please address and thank you in advance! Ok to send back and I can give update if appropriate.

## 2023-09-08 RX ORDER — CLONAZEPAM 1 MG/1
1 TABLET ORAL NIGHTLY PRN
Qty: 90 TABLET | Refills: 0 | Status: SHIPPED | OUTPATIENT
Start: 2023-09-08

## 2023-09-08 NOTE — TELEPHONE ENCOUNTER
Requested Prescriptions     Pending Prescriptions Disp Refills    CLONAZEPAM 1 MG Oral Tab [Pharmacy Med Name: CLONAZEPAM 1 MG TABLET] 90 tablet 0     Sig: Take 1 tablet (1 mg total) by mouth nightly as needed for Anxiety.        LOV: 5/4/23    LAST PHYSICAL: 5/4/23    LAST REFILL: clonazepam-90-5/9/23    LAST LAB: 3/13/23    Your Appointments      Tuesday November 07, 2023 11:20 AM  Follow up - Extended with David Novak MD  [address], [facility] ([facility] IM/FM [place]) [address] [facility]

## 2023-09-11 RX ORDER — CLONAZEPAM 1 MG/1
1 TABLET ORAL NIGHTLY PRN
Qty: 90 TABLET | Refills: 0 | OUTPATIENT
Start: 2023-09-11

## 2023-09-11 NOTE — TELEPHONE ENCOUNTER
clonazePAM 1 MG Oral Tab 90 tablet 0 9/8/2023     Sig - Route:  Take 1 tablet (1 mg total) by mouth nightly as needed for Anxiety. - Oral    Sent to pharmacy as: clonazePAM 1 MG Oral Tablet (KlonoPIN)    E-Prescribing Status: Receipt confirmed by pharmacy (9/8/2023 12:55 PM CDT)      Pharmacy    [address] [place] [address], [phone]

## 2023-09-12 RX ORDER — CLONAZEPAM 1 MG/1
1 TABLET ORAL NIGHTLY PRN
Qty: 90 TABLET | Refills: 0 | OUTPATIENT
Start: 2023-09-12

## 2023-09-25 NOTE — TELEPHONE ENCOUNTER
Triage: Good Day :)     Pt's wife calling for an update on bill. Notified I would contact the office and she should be getting a call back for further assistance. Understanding verbalized. Please contact [employer] and thank you in advance!

## 2023-09-26 ENCOUNTER — HOSPITAL ENCOUNTER (OUTPATIENT)
Dept: GENERAL RADIOLOGY | Age: 73
Discharge: HOME | End: 2023-09-26
Attending: INTERNAL MEDICINE

## 2023-09-26 ENCOUNTER — OFFICE VISIT (OUTPATIENT)
Dept: CARDIOLOGY | Age: 73
End: 2023-09-26
Attending: INTERNAL MEDICINE

## 2023-09-26 VITALS
SYSTOLIC BLOOD PRESSURE: 138 MMHG | HEART RATE: 55 BPM | DIASTOLIC BLOOD PRESSURE: 80 MMHG | OXYGEN SATURATION: 98 % | HEIGHT: 66 IN | TEMPERATURE: 97.5 F | WEIGHT: 185.08 LBS | BODY MASS INDEX: 29.74 KG/M2 | RESPIRATION RATE: 18 BRPM

## 2023-09-26 DIAGNOSIS — I25.118 ATHEROSCLEROSIS OF NATIVE CORONARY ARTERY OF NATIVE HEART WITH STABLE ANGINA PECTORIS (CMD): Chronic | ICD-10-CM

## 2023-09-26 DIAGNOSIS — E78.2 MIXED HYPERLIPIDEMIA: Chronic | ICD-10-CM

## 2023-09-26 DIAGNOSIS — M25.561 ACUTE PAIN OF RIGHT KNEE: Primary | ICD-10-CM

## 2023-09-26 DIAGNOSIS — M06.042 RHEUMATOID ARTHRITIS INVOLVING BOTH HANDS WITH NEGATIVE RHEUMATOID FACTOR (CMD): Chronic | ICD-10-CM

## 2023-09-26 DIAGNOSIS — M06.041 RHEUMATOID ARTHRITIS INVOLVING BOTH HANDS WITH NEGATIVE RHEUMATOID FACTOR (CMD): Chronic | ICD-10-CM

## 2023-09-26 DIAGNOSIS — Z86.73 HISTORY OF TRANSIENT ISCHEMIC ATTACK (TIA): Chronic | ICD-10-CM

## 2023-09-26 PROCEDURE — 99211 OFF/OP EST MAY X REQ PHY/QHP: CPT

## 2023-09-26 PROCEDURE — 73560 X-RAY EXAM OF KNEE 1 OR 2: CPT

## 2023-09-26 PROCEDURE — 99214 OFFICE O/P EST MOD 30 MIN: CPT | Performed by: INTERNAL MEDICINE

## 2023-09-26 RX ORDER — ASPIRIN 81 MG/1
1 TABLET, CHEWABLE ORAL DAILY
COMMUNITY

## 2023-09-26 ASSESSMENT — ENCOUNTER SYMPTOMS
WEIGHT GAIN: 0
CHILLS: 0
BRUISES/BLEEDS EASILY: 0
HEMATOCHEZIA: 0
HEMOPTYSIS: 0
WEIGHT LOSS: 0
SUSPICIOUS LESIONS: 0
FEVER: 0
COUGH: 0
ALLERGIC/IMMUNOLOGIC COMMENTS: NO NEW FOOD ALLERGIES.

## 2023-09-27 RX ORDER — CLOPIDOGREL BISULFATE 75 MG/1
75 TABLET ORAL DAILY
Qty: 90 TABLET | Refills: 3 | Status: SHIPPED | OUTPATIENT
Start: 2023-09-27

## 2023-10-02 ENCOUNTER — TELEPHONE (OUTPATIENT)
Dept: CARDIOLOGY | Age: 73
End: 2023-10-02

## 2023-10-04 ENCOUNTER — PATIENT OUTREACH (OUTPATIENT)
Dept: CASE MANAGEMENT | Age: 73
End: 2023-10-04

## 2023-10-04 DIAGNOSIS — F01.50 VASCULAR DEMENTIA WITHOUT BEHAVIORAL DISTURBANCE (HCC): ICD-10-CM

## 2023-10-04 DIAGNOSIS — I10 PRIMARY HYPERTENSION: ICD-10-CM

## 2023-10-04 DIAGNOSIS — M06.9 RHEUMATOID ARTHRITIS, INVOLVING UNSPECIFIED SITE, UNSPECIFIED WHETHER RHEUMATOID FACTOR PRESENT (HCC): ICD-10-CM

## 2023-10-04 DIAGNOSIS — G47.33 OSA ON CPAP: ICD-10-CM

## 2023-10-04 DIAGNOSIS — I25.10 CORONARY ARTERY DISEASE INVOLVING NATIVE CORONARY ARTERY OF NATIVE HEART WITHOUT ANGINA PECTORIS: ICD-10-CM

## 2023-10-04 DIAGNOSIS — K21.9 GASTROESOPHAGEAL REFLUX DISEASE, UNSPECIFIED WHETHER ESOPHAGITIS PRESENT: Primary | ICD-10-CM

## 2023-10-04 NOTE — PROGRESS NOTES
Spoke to Bryan and his wife (HIPAA verified) for CCM. Updates to patient care team/comments: UTD. Patient reported changes in medications: together we reviewed with no updates. Med Adherence  Comment: pt reports taking all medications as prescribed. Health Maintenance:     Reviewed with pt and wife     Colorectal Cancer Screening due on 04/30/2020  Influenza Vaccine(1) due on 10/01/2023  Zoster Vaccines(1 of 2) due on 03/08/2024  PSA due on 02/02/2024  Annual Physical due on 05/04/2024  Annual Depression Screening Completed  Fall Risk Screening (Annual) Completed  Pneumococcal Vaccine: 65+ Years Completed  COVID-19 Vaccine Discontinued    Patient updates/concerns:   Listened to pt and provided support. Recent visit with Dr. Ankur Wilcox. Per note:    Assessment/Plan: 1. Right knee pain without any obvious redness or acute inflammation, rule out underlying degenerative arthritis  2. Dyslipidemia with his last LDL of 74. This was done 7 months ago. We will have him get a follow-up in the next couple of months  3. Coronary artery disease with prior bypass surgery and multiple stenting  Plan: We will do x-ray of his right knee today. He will get a follow-up lipids in approximately 3 months. I will see him back in 6 months       Goals/Action Plan: Active goal from previous outreach: pt would like to work on not skipping meals. Eat at least 2 balanced meals with a snack a day.        Patient reported progress towards goals: progressing.                - What: eat 2 balanced meals a day            - When: pt would like to do this everyday but will start with 4-5 days a week            - How: wife prepares meals            - How Often: 4-5 times a week                                                                                        - Where: at home   Patient Reported Barriers and Concerns: lacks appetite so he tends to skip meals.                     - Plan for overcoming barriers: plans on preparing smaller portions so he is getting his nutrients. Care Managers Interventions:   Encouraged patient:   Self care: Take the time to do the things you love. Nutrition:  Good nutrition helps us to maintain our weight, fight off infection, and help reduce the risk of developing other chronic issues. Physical activity:  Physical activity is important to help maintain independence and improve quality of life. Future Appointments:   Future Appointments   Date Time Provider Edison Metcalfi   11/7/2023 11:20 AM Carlos Mcguire MD [facility]         Next Care Manager Follow Up Date: in 2 month. Reason For Follow Up: review progress and or barriers towards patient's goals. Time Spent This Encounter Total: 21 min medical record review, telephone communication, care plan updates where needed, education, goals, and action plan recreation/update. Provided acknowledgment and validation to patient's concerns.    Monthly Minute Total including today: 21  Physical assessment, complete health history, and need for CCM established by Feliz Camp MD.

## 2023-10-05 ENCOUNTER — TELEPHONE (OUTPATIENT)
Dept: NEUROLOGY | Age: 73
End: 2023-10-05

## 2023-10-08 PROBLEM — I73.9 PERIPHERAL VASCULAR DISEASE (CMD): Status: ACTIVE | Noted: 2023-04-05

## 2023-10-08 PROBLEM — Z86.19 HISTORY OF HELICOBACTER PYLORI INFECTION: Status: ACTIVE | Noted: 2023-05-04

## 2023-10-08 PROBLEM — M43.06 LUMBAR SPONDYLOLYSIS: Status: ACTIVE | Noted: 2023-05-04

## 2023-10-08 PROBLEM — L60.2 ONYCHOGRYPHOSIS: Status: ACTIVE | Noted: 2023-04-05

## 2023-10-08 PROBLEM — B35.1 ONYCHOMYCOSIS: Status: ACTIVE | Noted: 2023-04-05

## 2023-10-10 ENCOUNTER — PATIENT OUTREACH (OUTPATIENT)
Dept: CASE MANAGEMENT | Age: 73
End: 2023-10-10

## 2023-10-10 NOTE — PROGRESS NOTES
Spoke to pt's wife (HIPAA verified) she received another bill for owes $223 for PSA done 2/2/23. I spoke with Radha Vuong from [facility] she did resend with corrected dx code R97.2. Notified pt and suggested she call insurance company to figure out why they are still sending a bill. She will do so and if anything needed on our end she will follow up.       Total time spent with patient including chart review: 6  Time spent with patient this month: 27    Total time spent with communication and chart review this month to date: 32

## 2023-10-12 ENCOUNTER — TELEPHONE (OUTPATIENT)
Dept: NEUROLOGY | Age: 73
End: 2023-10-12

## 2023-11-02 ENCOUNTER — PATIENT OUTREACH (OUTPATIENT)
Dept: CASE MANAGEMENT | Age: 73
End: 2023-11-02

## 2023-11-02 DIAGNOSIS — Z95.1 HX OF CABG: ICD-10-CM

## 2023-11-02 DIAGNOSIS — I10 PRIMARY HYPERTENSION: ICD-10-CM

## 2023-11-02 DIAGNOSIS — F01.50 VASCULAR DEMENTIA WITHOUT BEHAVIORAL DISTURBANCE (HCC): ICD-10-CM

## 2023-11-02 DIAGNOSIS — G47.33 OSA ON CPAP: ICD-10-CM

## 2023-11-02 DIAGNOSIS — M06.9 RHEUMATOID ARTHRITIS, INVOLVING UNSPECIFIED SITE, UNSPECIFIED WHETHER RHEUMATOID FACTOR PRESENT (HCC): ICD-10-CM

## 2023-11-02 DIAGNOSIS — K21.9 GASTROESOPHAGEAL REFLUX DISEASE, UNSPECIFIED WHETHER ESOPHAGITIS PRESENT: Primary | ICD-10-CM

## 2023-11-02 DIAGNOSIS — I25.10 CORONARY ARTERY DISEASE INVOLVING NATIVE CORONARY ARTERY OF NATIVE HEART WITHOUT ANGINA PECTORIS: ICD-10-CM

## 2023-11-02 DIAGNOSIS — E78.00 HYPERCHOLESTEREMIA: ICD-10-CM

## 2023-11-02 DIAGNOSIS — Z95.5 HISTORY OF CORONARY ARTERY STENT PLACEMENT: ICD-10-CM

## 2023-11-02 NOTE — PROGRESS NOTES
Spoke to Bryan and his wife Stevie Montano (HIPAA verified) for CCM. Updates to patient care team/comments: UTD. Patient reported changes in medications: together we reviewed and updated. Pt states he is not on Duloxetine. Chart updated. Med Adherence  Comment: pt reports taking all medications as prescribed. Health Maintenance:   Reviewed with pt     Colorectal Cancer Screening due on 04/30/2020- reminded   Influenza Vaccine(1) due on 10/01/2023- deferred   Zoster Vaccines(1 of 2) due on 03/08/2024  PSA due on 02/02/2024  Annual Physical due on 05/04/2024  Annual Depression Screening Completed  Fall Risk Screening (Annual) Completed  Pneumococcal Vaccine: 65+ Years Completed  COVID-19 Vaccine Discontinued    Patient updates/concerns:   Listened and provided support. Back pain   Pt and wife state his pain has been worse. He is maintaining with Tramadol and says if provides some relieve. He does not want surgery and says he canceled his follow up visit with Dr. Irais Del Angel. Shared a success story with pt and Batool about [facility] that there pain free after a 10 year stimulator was placed. I did also reiterate everyone person and there situation is different and what works for one person may not be for the next and they should discuss with Dr. Kwasi Bruno to see what his thoughts would be as he will know best.      Diet: fairly good at times he can lack appetite. For awhile he didn't feel like eating but his appetite has come back and he has been trying to create a healthy diet. Tries not skipping meals but lack of appetite can play a factor. Does have at lease 2 smaller meals a day. Discussed diet and nutrition along with the importance of staying complaint. Doing better with diet has been trying to make sure he has at least two meals and a snack. Dementia   No worse. Goals/Action Plan: Active goal from previous outreach: pt would like to work on not skipping meals.   Eat at least 2 balanced meals with a snack a day. Patient reported progress towards goals: progressing.                - What: eat 2 balanced meals a day            - When: pt would like to do this everyday but will start with 4-5 days a week            - How: wife prepares meals            - How Often: 4-5 times a week                                                                                        - Where: at home   Patient Reported Barriers and Concerns: lacks appetite so he tends to skip meals.                     - Plan for overcoming barriers: plans on preparing smaller portions so he is getting his nutrients. Care Managers Interventions:   Encouraged patient:   Self care: Take the time to do the things you love. Nutrition:  Good nutrition helps us to maintain our weight, fight off infection, and help reduce the risk of developing other chronic issues. Physical activity:  Physical activity is important to help maintain independence and improve quality of life. Future Appointments:   Future Appointments   Date Time Provider Edison Yissel   11/7/2023 11:20 AM Lyla Zavaleta MD [facility]         Next Care Manager Follow Up Date: in 2 month. Reason For Follow Up: review progress and or barriers towards patient's goals. Time Spent This Encounter Total: 20 min medical record review, telephone communication, care plan updates where needed, education, goals, and action plan recreation/update. Provided acknowledgment and validation to patient's concerns.    Monthly Minute Total including today: 20  Physical assessment, complete health history, and need for CCM established by Rose Prescott MD.

## 2023-11-10 ENCOUNTER — TELEPHONE (OUTPATIENT)
Dept: NEUROLOGY | Age: 73
End: 2023-11-10

## 2023-11-10 DIAGNOSIS — G30.1 LATE ONSET ALZHEIMER'S DEMENTIA WITHOUT BEHAVIORAL DISTURBANCE (CMD): Primary | ICD-10-CM

## 2023-11-10 DIAGNOSIS — F02.80 LATE ONSET ALZHEIMER'S DEMENTIA WITHOUT BEHAVIORAL DISTURBANCE (CMD): Primary | ICD-10-CM

## 2023-11-10 RX ORDER — DONEPEZIL HYDROCHLORIDE 10 MG/1
10 TABLET, FILM COATED ORAL DAILY
Qty: 30 TABLET | Refills: 3 | Status: SHIPPED | OUTPATIENT
Start: 2023-11-10

## 2023-11-27 RX ORDER — DICYCLOMINE HYDROCHLORIDE 10 MG/1
10 CAPSULE ORAL
Qty: 90 CAPSULE | Refills: 0 | Status: SHIPPED | OUTPATIENT
Start: 2023-11-27

## 2023-11-27 RX ORDER — PANTOPRAZOLE SODIUM 40 MG/1
40 TABLET, DELAYED RELEASE ORAL
Qty: 90 TABLET | Refills: 0 | Status: SHIPPED | OUTPATIENT
Start: 2023-11-27

## 2023-12-04 ENCOUNTER — PATIENT OUTREACH (OUTPATIENT)
Dept: CASE MANAGEMENT | Age: 73
End: 2023-12-04

## 2023-12-04 DIAGNOSIS — I25.10 CORONARY ARTERY DISEASE INVOLVING NATIVE CORONARY ARTERY OF NATIVE HEART WITHOUT ANGINA PECTORIS: ICD-10-CM

## 2023-12-04 DIAGNOSIS — I10 PRIMARY HYPERTENSION: ICD-10-CM

## 2023-12-04 DIAGNOSIS — F01.50 VASCULAR DEMENTIA WITHOUT BEHAVIORAL DISTURBANCE (HCC): ICD-10-CM

## 2023-12-04 DIAGNOSIS — M06.9 RHEUMATOID ARTHRITIS, INVOLVING UNSPECIFIED SITE, UNSPECIFIED WHETHER RHEUMATOID FACTOR PRESENT (HCC): ICD-10-CM

## 2023-12-04 DIAGNOSIS — E78.00 HYPERCHOLESTEREMIA: ICD-10-CM

## 2023-12-04 DIAGNOSIS — K21.9 GASTROESOPHAGEAL REFLUX DISEASE, UNSPECIFIED WHETHER ESOPHAGITIS PRESENT: Primary | ICD-10-CM

## 2023-12-04 DIAGNOSIS — G47.33 OSA ON CPAP: ICD-10-CM

## 2023-12-04 RX ORDER — GABAPENTIN 100 MG/1
1 CAPSULE ORAL 3 TIMES DAILY
COMMUNITY
Start: 2023-11-10

## 2023-12-04 NOTE — PROGRESS NOTES
Spoke to Juan's wife Ren Mortensen (HIPAA verified) for CCM. Updates to patient care team/comments: UTD. Patient reported changes in medications: together we reviewed with no updates. Med Adherence  Comment: pt reports taking all medications as prescribed. Health Maintenance:   Reviewed together     Health Maintenance   Topic Date Due    Colorectal Cancer Screening  04/30/2020- defers     Zoster Vaccines (1 of 2) 03/08/2024 (Originally 10/17/1999)    Influenza Vaccine (1) 11/02/2024 (Originally 10/1/2023)    PSA  02/02/2024    Annual Physical  05/04/2024    Annual Depression Screening  Completed    Fall Risk Screening (Annual)  Completed    Pneumococcal Vaccine: 65+ Years  Completed    COVID-19 Vaccine  Discontinued       Patient updates/concerns:   Listened and provided support. Reminded needs follow up with Dr. Justin Carnes. Dementia   No worse. Diet: fairly good at times he can lack appetite. For awhile he didn't feel like eating but his appetite has come back and he has been trying to create a healthy diet. Tries not skipping meals but lack of appetite can play a factor. Does have at lease 2 smaller meals a day.        [facility] Vital Signs  Vital Sign Reading Time Taken Comments   Blood Pressure 158/86 12/01/2023 9:41 AM CST     Pulse 58 12/01/2023 9:41 AM CST     Temperature - -     Respiratory Rate - -     Oxygen Saturation 98% 12/01/2023 9:41 AM CST     Patient Reported Vitals        7/23/2019 1/4/2022 12/1/2023   PT REPORTED VITALS   Patient Reported Blood Pressure   158/86   Patient Reported Pulse   58   Patient Reported SpO2   98   Pain Score 7 - (MH) 0 - (NP)      Pain Management Ayana Garcia PTA    Description:    Client to continue to take pain medications as prescribed by MD      Core strengthening exercises      Daily ROM exercises to decrease pain and swelling     Upcoming Encounters  Date Type Department Care Team (Latest Contact Info) Description 12/06/2023 7:00  [facility],[address], [address]  Marla Cnadelario, PTA      12/08/2023 7:00  [facility],[address], [facility] [address]   [phone]  Marla Candelario, PTA      12/13/2023 7:00 AM CST Home Care Visit [address],[address] [facility], [facility] [address]   [phone]  Marla Candelario, PTA      12/15/2023 8:00 AM CST Home Care Visit [address],[address], [facility] [address]  Tete Garcia PT       Goals/Action Plan: Active goal from previous outreach: pt would like to work on not skipping meals. Eat at least 2 balanced meals with a snack a day. Patient reported progress towards goals: progressing.                - What: eat 2 balanced meals a day            - When: pt would like to do this everyday but will start with 4-5 days a week            - How: wife prepares meals            - How Often: 4-5 times a week                                                                                        - Where: at home   Patient Reported Barriers and Concerns: lacks appetite so he tends to skip meals.                     - Plan for overcoming barriers: plans on preparing smaller portions so he is getting his nutrients. Care Managers Interventions:   Encouraged patient:   Self care: Take the time to do the things you love. Nutrition:  Good nutrition helps us to maintain our weight, fight off infection, and help reduce the risk of developing other chronic issues. Physical activity:  Physical activity is important to help maintain independence and improve quality of life. Future Appointments: No future appointments. Next Care Manager Follow Up Date: in 1 month. Reason For Follow Up: review progress and or barriers towards patient's goals.      Time Spent This Encounter Total: 23 min medical record review, telephone communication, care plan updates where needed, education, goals, and action plan recreation/update. Provided acknowledgment and validation to patient's concerns.    Monthly Minute Total including today: 23  Physical assessment, complete health history, and need for CCM established by Ginny Alas MD.\

## 2023-12-08 NOTE — TELEPHONE ENCOUNTER
[facility] pharmacy:  Aziza Frey [address] [place] [address], [phone]     Prescription Refill Request - Patient advised can take 48-72 hours. Patient is out of medication. Name of Medication (strength, dose, qty requested:     doxazosin 8 MG Oral Tab 90 tablet 3 12/2/2021    Sig:   Take 1 tablet (8 mg total) by mouth nightly.

## 2023-12-09 RX ORDER — DOXAZOSIN 8 MG/1
8 TABLET ORAL NIGHTLY
Qty: 90 TABLET | Refills: 0 | Status: SHIPPED | OUTPATIENT
Start: 2023-12-09

## 2023-12-09 NOTE — TELEPHONE ENCOUNTER
Requested Prescriptions     Pending Prescriptions Disp Refills    doxazosin 8 MG Oral Tab 90 tablet 0     Sig: Take 1 tablet (8 mg total) by mouth nightly.        LOV: 5-4-2023-mk-physical    LAST CPE:5-4-2023-mk-physical    Last Labs: 11-3-2023-cbc,cmp,    Last Refill:

## 2023-12-11 RX ORDER — CLONAZEPAM 1 MG/1
1 TABLET ORAL NIGHTLY PRN
Qty: 90 TABLET | Refills: 0 | Status: SHIPPED | OUTPATIENT
Start: 2023-12-11

## 2023-12-22 DIAGNOSIS — K58.9 IRRITABLE BOWEL SYNDROME, UNSPECIFIED TYPE: Primary | ICD-10-CM

## 2023-12-24 RX ORDER — DICYCLOMINE HYDROCHLORIDE 10 MG/1
10 CAPSULE ORAL
Qty: 120 CAPSULE | Refills: 0 | Status: SHIPPED | OUTPATIENT
Start: 2023-12-24 | End: 2024-01-23

## 2024-01-02 ENCOUNTER — PATIENT OUTREACH (OUTPATIENT)
Dept: CASE MANAGEMENT | Age: 74
End: 2024-01-02

## 2024-01-02 DIAGNOSIS — E78.00 HYPERCHOLESTEREMIA: ICD-10-CM

## 2024-01-02 DIAGNOSIS — K21.9 GASTROESOPHAGEAL REFLUX DISEASE, UNSPECIFIED WHETHER ESOPHAGITIS PRESENT: Primary | ICD-10-CM

## 2024-01-02 DIAGNOSIS — I10 PRIMARY HYPERTENSION: ICD-10-CM

## 2024-01-02 DIAGNOSIS — I25.10 CORONARY ARTERY DISEASE INVOLVING NATIVE CORONARY ARTERY OF NATIVE HEART WITHOUT ANGINA PECTORIS: ICD-10-CM

## 2024-01-02 DIAGNOSIS — F01.50 VASCULAR DEMENTIA WITHOUT BEHAVIORAL DISTURBANCE (HCC): ICD-10-CM

## 2024-01-02 DIAGNOSIS — M06.9 RHEUMATOID ARTHRITIS, INVOLVING UNSPECIFIED SITE, UNSPECIFIED WHETHER RHEUMATOID FACTOR PRESENT (HCC): ICD-10-CM

## 2024-01-02 DIAGNOSIS — G47.33 OSA ON CPAP: ICD-10-CM

## 2024-01-02 NOTE — PROGRESS NOTES
Spoke to Juan's wife Batool (HIPAA verified) for CCM.      Updates to patient care team/comments: UTD.   Patient reported changes in medications: together we reviewed with no updates.   Med Adherence  Comment: pt reports taking all medications as prescribed.       Health Maintenance:   Reviewed with pt     Health Maintenance   Topic Date Due    Colorectal Cancer Screening  04/30/2020- reminded     HTN: BP Follow-Up  01/01/2024- will call with reading     PSA  02/02/2024    Zoster Vaccines (1 of 2) 03/08/2024 (Originally 10/17/1999)    Influenza Vaccine (1) 11/02/2024 (Originally 10/1/2023)    Annual Physical  05/04/2024    Annual Depression Screening  Completed    Fall Risk Screening (Annual)  Completed    Pneumococcal Vaccine: 65+ Years  Completed    COVID-19 Vaccine  Discontinued     Completed PHQ-9 and fall risk flowsheets today.     Patient updates/concerns:   Listened and provided support.     Spoke to Batool states pt is not feeling well.  He currently has diarrhea and some vomiting the last couple days.  He is feeling fatigue because of this.  He has not been around anyone that has been ill stating they were alone for New Year.  She is currently at dialysis but is awaiting her bus to transport her home.  She says she just called to check up on pt and he was resting.  I asked if she would like a further assessment from Dr. Becker she deferred and says pt says it will pass.  I did suggest pt stay hydrated.      She will call back with blood pressure reading.      Diet: fairly good at times he can lack appetite.  For awhile he didn't feel like eating but his appetite has come back and he has been trying to create a healthy diet.  Tries not skipping meals but lack of appetite can play a factor.  Does have at lease 2 smaller meals a day.        Dementia   No worse.    Back pain  Recent visit with Dr. Blum.  Per note:   IMPRESSION:   1. No lumbar disc extrusion or specific nerve root compression.   2. No  lumbar central spinal canal stenosis.   3. Mild lumbar facet arthrosis, without specific MR signs of active facetal synovitis.     Does patient currently take blood thinners?   Asprin - last taken on current  Plavix - last taken on current  Does patient currently take any antibiotics? No  Patient Education: Patient Patient was advised to continue normal activities as tolerated and was advised against any form of bedrest, since recent guidelines promote and encourage physical activity for improvment of functionality and overall pain. (Family Practice Vol. 16, No. 1, 17-47Â© [employer] 1999 ), Patient was given brochure,website information, and handouts., Patient was shown interactive content, shown and explained procedure on spinal model.  Patient educated and verbalized understanding.  Medical Decision Making:   Diagnosis:   Lumbar spondylosis (primary encounter diagnosis)  Lumbar radiculopathy, right  Age-related osteoporosis without current pathological fracture  Spinal stenosis of lumbar region with neurogenic claudication  Impression: no lasting relief with tyler's or facet MBB's. His pain is c/w neurogenic claudication. He has mild foraminal stenosis at L4-5 which is c/w his pain pattern. His ability to walk is declining over time. Discussed vertiflex L4-5. Discussed procedure and risks with patient to include bleeding, infection, nerve damage, vertebral fracture, implant migration, csf leak. Patient wishes to proceed if candidate. I would like to update his MRI and DEXA scan before proceeding.   Plan:   MRI lumbar spine  DEXA scan  F/u after testing completed for further discussion. Vertiflex brochure given to patient.   Interferential therapy ordered for patient.   Orders Placed This Encounter  RS-21 2 Channel Interferential Stimulator     Goals/Action Plan:    Active goal from previous outreach: pt would like to work on not skipping meals.  Eat at least 2 balanced meals with a snack a day.        Patient reported progress towards goals: progressing.                - What: eat 2 balanced meals a day            - When: pt would like to do this everyday but will start with 4-5 days a week            - How: wife prepares meals            - How Often: 4-5 times a week                                                                                        - Where: at home   Patient Reported Barriers and Concerns: lacks appetite so he tends to skip meals.                     - Plan for overcoming barriers: plans on preparing smaller portions so he is getting his nutrients.     Care Managers Interventions:   Encouraged patient:   Self care: Take the time to do the things you love.   Nutrition:  Good nutrition helps us to maintain our weight, fight off infection, and help reduce the risk of developing other chronic issues.   Physical activity:  Physical activity is important to help maintain independence and improve quality of life.     Future Appointments: No future appointments.      Next Care Manager Follow Up Date: in 1 month.     Reason For Follow Up: review progress and or barriers towards patient's goals.     Time Spent This Encounter Total: 20 min medical record review, telephone communication, care plan updates where needed, education, goals, and action plan recreation/update. Provided acknowledgment and validation to patient's concerns.   Monthly Minute Total including today: 20  Physical assessment, complete health history, and need for CCM established by Zachariah Becker MD.

## 2024-01-04 ENCOUNTER — TELEPHONE (OUTPATIENT)
Dept: NEUROLOGY | Age: 74
End: 2024-01-04

## 2024-01-09 ENCOUNTER — TELEPHONE (OUTPATIENT)
Dept: INTERNAL MEDICINE CLINIC | Facility: CLINIC | Age: 74
End: 2024-01-09

## 2024-01-09 NOTE — TELEPHONE ENCOUNTER
[facility] faxed over a following order form from last January to be signed and faxed to the number provided

## 2024-01-09 NOTE — TELEPHONE ENCOUNTER
Form was completed and faxed back to [facility], I received a confirm transmission report, placed in scan bin

## 2024-02-01 ENCOUNTER — PATIENT OUTREACH (OUTPATIENT)
Dept: CASE MANAGEMENT | Age: 74
End: 2024-02-01

## 2024-02-01 DIAGNOSIS — M06.9 RHEUMATOID ARTHRITIS, INVOLVING UNSPECIFIED SITE, UNSPECIFIED WHETHER RHEUMATOID FACTOR PRESENT (HCC): ICD-10-CM

## 2024-02-01 DIAGNOSIS — I25.10 CORONARY ARTERY DISEASE INVOLVING NATIVE CORONARY ARTERY OF NATIVE HEART WITHOUT ANGINA PECTORIS: ICD-10-CM

## 2024-02-01 DIAGNOSIS — F01.50 VASCULAR DEMENTIA WITHOUT BEHAVIORAL DISTURBANCE (HCC): ICD-10-CM

## 2024-02-01 DIAGNOSIS — K21.9 GASTROESOPHAGEAL REFLUX DISEASE, UNSPECIFIED WHETHER ESOPHAGITIS PRESENT: ICD-10-CM

## 2024-02-01 DIAGNOSIS — Z95.5 HISTORY OF CORONARY ARTERY STENT PLACEMENT: Primary | ICD-10-CM

## 2024-02-01 DIAGNOSIS — I10 PRIMARY HYPERTENSION: ICD-10-CM

## 2024-02-01 NOTE — PROGRESS NOTES
Spoke to Juan's wife Batool (HIPAA verified) for CCM.      Updates to patient care team/comments: UTD.   Patient reported changes in medications: together we reviewed with no updates.   Med Adherence  Comment: pt reports taking all medications as prescribed.       Health Maintenance:   Reviewed with spouse    Health Maintenance   Topic Date Due    Colorectal Cancer Screening  04/30/2020- reminded     HTN: BP Follow-Up  01/01/2024- does not check at home    PSA  02/02/2024    Zoster Vaccines (1 of 2) 03/08/2024 (Originally 10/17/1999)    Influenza Vaccine (1) 11/02/2024 (Originally 10/1/2023)    Annual Physical  05/04/2024    Annual Depression Screening  Completed    Fall Risk Screening (Annual)  Completed    Pneumococcal Vaccine: 65+ Years  Completed    COVID-19 Vaccine  Discontinued     Scheduled AWV today with Dr. Becker.     Patient updates/concerns:   Listened and provided support.     Pt doing better since our last outreach.  Has been doing Physical Therapy through [facility] twice a week.  Still does chores around the house gets around pretty good she says.      Back pain  Following Dr. Blum.  Batool says pt has good and bad days but he manages fairly well.      Dementia   No worse.     Diet: fairly good at times he can lack appetite.  For awhile he didn't feel like eating but his appetite has come back and he has been trying to create a healthy diet.  Tries not skipping meals but lack of appetite can play a factor.  Does have at lease 2 smaller meals a day.      Hasn't check blood pressure at home because PT was doing this.    Last was 12/28/23 146/78, pulse 62  Patient Reported Vitals        1/4/2022 12/1/2023 12/28/2023   PT REPORTED VITALS   Patient Reported Blood Pressure  158/86 146/78   Patient Reported Pulse  58 62   Patient Reported SpO2  98    Pain Score 0 - (NP)        Goals/Action Plan:    Active goal from previous outreach: pt would like to work on not skipping meals.  Eat at least 2  balanced meals with a snack a day.       Patient reported progress towards goals: progressing.                - What: eat 2 balanced meals a day            - When: pt would like to do this everyday but will start with 4-5 days a week            - How: wife prepares meals            - How Often: 4-5 times a week                                                                                        - Where: at home   Patient Reported Barriers and Concerns: lacks appetite so he tends to skip meals.                     - Plan for overcoming barriers: plans on preparing smaller portions so he is getting his nutrients.     Care Managers Interventions:   Encouraged patient:   Self care: Take the time to do the things you love.   Nutrition:  Good nutrition helps us to maintain our weight, fight off infection, and help reduce the risk of developing other chronic issues.   Physical activity:  Physical activity is important to help maintain independence and improve quality of life.     Future Appointments:   Future Appointments   Date Time Provider Department Center   5/9/2024  1:30 PM Zachariah Becker MD [facility]         Next Care Manager Follow Up Date: in 1 month.     Reason For Follow Up: review progress and or barriers towards patient's goals.     Time Spent This Encounter Total: 25 min medical record review, telephone communication, care plan updates where needed, education, goals, and action plan recreation/update. Provided acknowledgment and validation to patient's concerns.   Monthly Minute Total including today: 25  Physical assessment, complete health history, and need for CCM established by Zachariah Becker MD.

## 2024-02-06 ENCOUNTER — TELEPHONE (OUTPATIENT)
Dept: NEUROLOGY | Age: 74
End: 2024-02-06

## 2024-02-06 DIAGNOSIS — G30.1 MODERATE LATE ONSET ALZHEIMER'S DEMENTIA WITHOUT BEHAVIORAL DISTURBANCE, PSYCHOTIC DISTURBANCE, MOOD DISTURBANCE, OR ANXIETY (CMD): Primary | Chronic | ICD-10-CM

## 2024-02-06 DIAGNOSIS — F02.B0 MODERATE LATE ONSET ALZHEIMER'S DEMENTIA WITHOUT BEHAVIORAL DISTURBANCE, PSYCHOTIC DISTURBANCE, MOOD DISTURBANCE, OR ANXIETY (CMD): Primary | Chronic | ICD-10-CM

## 2024-02-06 RX ORDER — CLOPIDOGREL BISULFATE 75 MG/1
1 TABLET ORAL DAILY
COMMUNITY

## 2024-02-06 RX ORDER — TRAMADOL HYDROCHLORIDE 50 MG/1
50 TABLET ORAL PRN
COMMUNITY

## 2024-02-06 RX ORDER — CLINDAMYCIN PHOSPHATE 10 UG/ML
LOTION TOPICAL
COMMUNITY
Start: 2023-11-06 | End: 2024-02-06

## 2024-02-06 RX ORDER — GABAPENTIN 100 MG/1
CAPSULE ORAL
COMMUNITY
Start: 2023-11-10 | End: 2024-02-06

## 2024-02-06 RX ORDER — NITROFURANTOIN 25; 75 MG/1; MG/1
CAPSULE ORAL
COMMUNITY
Start: 2023-11-07 | End: 2024-02-06

## 2024-02-06 RX ORDER — NITROGLYCERIN 0.4 MG/1
0.4 TABLET SUBLINGUAL
COMMUNITY

## 2024-02-06 RX ORDER — GALANTAMINE HYDROBROMIDE 8 MG/1
8 CAPSULE, EXTENDED RELEASE ORAL
Qty: 30 CAPSULE | Refills: 0 | Status: SHIPPED | OUTPATIENT
Start: 2024-02-06

## 2024-02-06 RX ORDER — LOPERAMIDE HYDROCHLORIDE 2 MG/1
2 CAPSULE ORAL PRN
COMMUNITY
Start: 2024-01-04

## 2024-02-06 RX ORDER — CLONAZEPAM 1 MG/1
1 TABLET ORAL NIGHTLY
COMMUNITY
Start: 2023-12-11

## 2024-02-06 RX ORDER — PANTOPRAZOLE SODIUM 40 MG/1
40 TABLET, DELAYED RELEASE ORAL
COMMUNITY
Start: 2023-11-27

## 2024-02-12 RX ORDER — NITROGLYCERIN 0.4 MG/1
TABLET SUBLINGUAL
Qty: 25 TABLET | Refills: 3 | Status: SHIPPED | OUTPATIENT
Start: 2024-02-12

## 2024-02-14 ENCOUNTER — TELEPHONE (OUTPATIENT)
Dept: NEUROLOGY | Age: 74
End: 2024-02-14

## 2024-02-14 PROBLEM — L60.0 INGROWING TOENAIL: Status: ACTIVE | Noted: 2024-01-12

## 2024-02-14 PROBLEM — R19.7 DIARRHEA: Status: ACTIVE | Noted: 2024-01-02

## 2024-03-01 ENCOUNTER — PATIENT OUTREACH (OUTPATIENT)
Dept: CASE MANAGEMENT | Age: 74
End: 2024-03-01

## 2024-03-05 ENCOUNTER — TELEPHONE (OUTPATIENT)
Dept: NEUROLOGY | Age: 74
End: 2024-03-05

## 2024-03-14 ENCOUNTER — APPOINTMENT (OUTPATIENT)
Dept: NEUROLOGY | Age: 74
End: 2024-03-14

## 2024-03-14 VITALS
DIASTOLIC BLOOD PRESSURE: 67 MMHG | WEIGHT: 182.76 LBS | BODY MASS INDEX: 29.5 KG/M2 | SYSTOLIC BLOOD PRESSURE: 136 MMHG | OXYGEN SATURATION: 99 % | HEART RATE: 62 BPM | RESPIRATION RATE: 16 BRPM

## 2024-03-14 DIAGNOSIS — G25.9 EXTRAPYRAMIDAL DISORDER: ICD-10-CM

## 2024-03-14 DIAGNOSIS — F03.B3 MODERATE DEMENTIA WITH MOOD DISTURBANCE, UNSPECIFIED DEMENTIA TYPE (CMD): Primary | ICD-10-CM

## 2024-03-14 DIAGNOSIS — F32.A DEPRESSION, UNSPECIFIED DEPRESSION TYPE: ICD-10-CM

## 2024-03-14 PROBLEM — F03.90 DEMENTIA (CMD): Status: ACTIVE | Noted: 2021-07-08

## 2024-03-14 PROCEDURE — G2211 COMPLEX E/M VISIT ADD ON: HCPCS | Performed by: PSYCHIATRY & NEUROLOGY

## 2024-03-14 PROCEDURE — 99205 OFFICE O/P NEW HI 60 MIN: CPT | Performed by: PSYCHIATRY & NEUROLOGY

## 2024-03-14 RX ORDER — MEMANTINE HYDROCHLORIDE 5 MG/1
TABLET ORAL
Qty: 70 TABLET | Refills: 0 | Status: SHIPPED | OUTPATIENT
Start: 2024-03-14

## 2024-03-14 RX ORDER — GALANTAMINE HYDROBROMIDE 8 MG/1
8 CAPSULE, EXTENDED RELEASE ORAL
COMMUNITY
End: 2024-03-18

## 2024-03-14 ASSESSMENT — MINI MENTAL STATE EXAM
WHAT IS THE NAME OF THIS BUILDING [IN FACILITY]?/WHAT IS THE STREET ADDRESS OF THIS HOUSE [IN HOME]?: CORRECT
SAY: I WOULD LIKE YOU TO COUNT BACKWARD FROM 100 BY SEVENS REPONSE 93: INCORRECT
WHAT DAY OF THE WEEK IS THIS?: INCORRECT
CHAIR- I AM GOING TO NAME THREE OBJECTS. WHEN I AM FINISHED, I WANT YOU TO REPEAT THEM [APPLE/CHAIR/HORSE]: CORRECT
ATTENTION SUBSCORE Q4: 0
SAY:  FOLD THE PAPER IN HALF ONCE WITH BOTH HANDS, SCORE IF PAPER IS CORRECTLY FOLDED IN HALF.: CORRECT
SAY:  PUT THE PAPER DOWN ON
THE FLOOR, SCORE IF PAPER IS PLACED BACK ON FLOOR: CORRECT
WHAT IS TODAY'S DATE?: INCORRECT
WHAT FLOOR ARE WE ON [IN FACILITY]?/ WHAT ROOM ARE WE IN [IN HOME]?: CORRECT
SHOW: WRISTWATCH [OBJECT] ASK: WHAT IS THIS CALLED?: CORRECT
LANGUAGE SUBSCORE Q6-Q12: 7
WHAT CITY/TOWN ARE WE IN?: INCORRECT
SAY: I WOULD LIKE YOU TO REPEAT THIS PHRASE AFTER ME: NO IFS, ANDS, OR BUTS.: INCORRECT
WHAT STATE [OR PROVINCE] ARE WE IN?: CORRECT
SAY: I WOULD LIKE YOU TO COUNT BACKWARD FROM 100 BY SEVENS REPONSE 86: INCORRECT
NUMBER OF TRIALS REQUIRED: 1
SAY: I WOULD LIKE YOU TO COUNT BACKWARD FROM 100 BY SEVENS REPONSE 65: INCORRECT
HAND THE PERSON A PENCIL AND PAPER. SAY:  WRITE ANY COMPLETE SENTENCE ON THAT
PIECE OF PAPER. (NOTE: THE SENTENCE MUST MAKE SENSE.  IGNORE SPELLING ERRORS): CORRECT
SHOW: PENCIL [OBJECT] ASK: WHAT IS THIS CALLED?: CORRECT
WHAT COUNTRY ARE WE IN?: INCORRECT
REGISTRATION SUBSCORE Q3: 3
SAY: I WOULD LIKE YOU TO COUNT BACKWARD FROM 100 BY SEVENS REPONSE 72: INCORRECT
SAY: I WOULD LIKE YOU TO COUNT BACKWARD FROM 100 BY SEVENS REPONSE 79: INCORRECT
ORIENTATION SUBSCORE Q1 - Q2: 4
HORSE - I AM GOING TO NAME THREE OBJECTS. WHEN I AM FINISHED, I WANT YOU TO REPEAT THEM [APPLE/CHAIR/HORSE]: CORRECT
HORSE - NOW WHAT WERE THE THREE OBJECTS I ASKED YOU TO REMEMBER? [APPLE/CHAIR/HORSE]: INCORRECT
SUM ALL MMSE QUESTIONS FOR TOTAL SCORE [OUT OF 30].: 14
CHAIR - NOW WHAT WERE THE THREE OBJECTS I ASKED YOU TO REMEMBER? [APPLE/CHAIR/HORSE]: INCORRECT
WHAT YEAR IS THIS?: INCORRECT
WHAT MONTH IS THIS?: CORRECT
WHICH SEASON IS THIS?: INCORRECT
APPLE - NOW WHAT WERE THE THREE OBJECTS I ASKED YOU TO REMEMBER? [APPLE/CHAIR/HORSE]: INCORRECT
SAY:  READ THE WORDS ON THE PAGE AND THEN DO WHAT IT SAYS.  THEN HAND THE PERSON
THE SHEET WITH CLOSE YOUR EYES ON IT.  IF THE SUBJECT READS AND DOES NOT CLOSE THEIR
EYES, REPEAT UP TO THREE TIMES.  SCORE ONLY IF SUBJECT CLOSES EYES.: CORRECT
PLACE DESIGN, ERASER AND PENCIL IN FRONT OF THE PERSON.  SAY:  COPY THIS DESIGN PLEASE.  SHOW: DESIGN. ALLOW: MULTIPLE TRIES. WAIT UNTIL PERSON IS FINISHED AND HANDS IT BACK. SCORE: ONLY FOR DIAGRAM WITH 4-SIDED FIGURE BETWEEN TWO 5-SIDED FIGURES: INCORRECT
APPLE - I AM GOING TO NAME THREE OBJECTS. WHEN I AM FINISHED, I WANT YOU TO REPEAT THEM [APPLE/CHAIR/HORSE]: CORRECT
"ASK THE PERSON IF HE IS RIGHT
OR LEFT-HANDED.  TAKE A PIECE OF PAPER AND HOLD IT UP IN
FRONT OF THE PERSON.  SAY:  TAKE THIS PAPER IN YOUR RIGHT/LEFT HAND (WHICHEVER IS NON-
DOMINANT), SCORE IF PAPER IS PICKED UP IN CORRECT HAND.: CORRECT
RECALL SUBSCORE Q5: 0
SPELL THE WORD WORLD. NOW SPELL IT BACKWARDS.: COULD NOT ACCOMPLISH

## 2024-03-14 ASSESSMENT — ENCOUNTER SYMPTOMS
NEUROLOGICAL NEGATIVE: 1
COUGH: 1
HEMATOLOGIC/LYMPHATIC NEGATIVE: 1
ENDOCRINE NEGATIVE: 1
CONSTITUTIONAL NEGATIVE: 1
SLEEP DISTURBANCE: 1
EYES NEGATIVE: 1
GASTROINTESTINAL NEGATIVE: 1

## 2024-03-14 ASSESSMENT — PATIENT HEALTH QUESTIONNAIRE - PHQ9: IS PATIENT ABLE TO COMPLETE PHQ2 OR PHQ9: YES

## 2024-03-20 ENCOUNTER — TELEPHONE (OUTPATIENT)
Dept: NEUROLOGY | Age: 74
End: 2024-03-20

## 2024-03-22 RX ORDER — DOXAZOSIN 8 MG/1
8 TABLET ORAL NIGHTLY
Qty: 90 TABLET | Refills: 0 | Status: SHIPPED | OUTPATIENT
Start: 2024-03-22

## 2024-03-22 NOTE — TELEPHONE ENCOUNTER
Name from pharmacy: DOXAZOSIN MESYLATE 8 MG TAB          Will file in chart as: DOXAZOSIN 8 MG Oral Tab    Sig: Take 1 tablet (8 mg total) by mouth nightly.    Disp: 90 tablet    Refills: 0    Start: 3/20/2024    Class: Normal    Non-formulary    Last ordered: 3 months ago (12/9/2023) by Zachariah Becker MD    Last refill: 12/9/2023    Rx #: [id number]    Hypertension Medications Protocol [id number]/20/2024 08:23 AM   Protocol Details CMP or BMP in past 12 months    EGFRCR or GFRNAA > 50    Last BP reading less than 140/90    In person appointment or virtual visit in the past 12 mos or appointment in next 3 mos      To be filled at: [facility] [address] [place] [address] [phone], [phone]          LOV with  5/4/23 for cpe. Scheduled 5/14/23. Last refilled 12/9/23 #90. Not passing protocol. Pended if agreeable.

## 2024-03-25 NOTE — TELEPHONE ENCOUNTER
Pt daughter Chelly calling on pt's behalf. Listed as emergency contact but not oh HIPAA. Chelly stating that pt is staying with her right now as spouse is in the hospital. He only has 1 pill left of requested medicine and she is asking if it can be called in to pharmacy closer to her. She states pt has dementia and can't fall asleep without the medication.      [id number] [place], [place] - [facility] [address] AT [facility] [address], [phone], [phone] [[id number]]

## 2024-03-25 NOTE — TELEPHONE ENCOUNTER
Sent to wrong pharmacy please resend.    [id number] - [place], [place] - [address] AT [facility] [address], [phone], [phone] [[id number]]

## 2024-03-26 NOTE — TELEPHONE ENCOUNTER
Pt's daughter Chelly called Not on HIPAA spoke with pt to get a verbal ok to speak to daughter. She stated she went to  the medication yesterday after 330pm and was told there was a problem. She would like for us to call about the problem. No info was given to her I advised her to call the pharmacy and give us a call back.

## 2024-03-27 DIAGNOSIS — E78.2 MIXED HYPERLIPIDEMIA: Primary | ICD-10-CM

## 2024-03-28 NOTE — TELEPHONE ENCOUNTER
Medication received. Daughter states, patient states he takes daily.Daughter states, mom is on hospice. Ok to send longer since so much going through a lot right now?

## 2024-04-08 RX ORDER — PANTOPRAZOLE SODIUM 40 MG/1
40 TABLET, DELAYED RELEASE ORAL
Qty: 90 TABLET | Refills: 0 | Status: SHIPPED | OUTPATIENT
Start: 2024-04-08

## 2024-04-08 RX ORDER — METOPROLOL SUCCINATE 50 MG/1
50 TABLET, EXTENDED RELEASE ORAL 2 TIMES DAILY
Qty: 180 TABLET | Refills: 1 | Status: SHIPPED | OUTPATIENT
Start: 2024-04-08

## 2024-04-10 ENCOUNTER — TELEPHONE (OUTPATIENT)
Dept: NEUROLOGY | Age: 74
End: 2024-04-10

## 2024-04-10 DIAGNOSIS — F03.B3 MODERATE DEMENTIA WITH MOOD DISTURBANCE, UNSPECIFIED DEMENTIA TYPE (CMD): ICD-10-CM

## 2024-04-10 DIAGNOSIS — F40.240 CLAUSTROPHOBIA: Primary | ICD-10-CM

## 2024-04-10 RX ORDER — LORAZEPAM 0.5 MG/1
TABLET ORAL
Qty: 2 TABLET | Refills: 0 | Status: SHIPPED | OUTPATIENT
Start: 2024-04-10

## 2024-04-10 RX ORDER — MEMANTINE HYDROCHLORIDE 5 MG/1
5 TABLET ORAL 2 TIMES DAILY
Qty: 180 TABLET | Refills: 1 | Status: SHIPPED | OUTPATIENT
Start: 2024-04-10

## 2024-04-11 ENCOUNTER — PATIENT OUTREACH (OUTPATIENT)
Dept: CASE MANAGEMENT | Age: 74
End: 2024-04-11

## 2024-04-11 NOTE — PROGRESS NOTES
Reviewed pt's chart including recent visits.  Attempted to contact pt for monthly outreach no answer left detailed message for pt to call back.   Total time spent with patient including chart review: 2  Time spent with patient this month: 2    Total time spent with communication and chart review this month to date: 2

## 2024-04-22 ENCOUNTER — TELEPHONE (OUTPATIENT)
Dept: NEUROLOGY | Age: 74
End: 2024-04-22

## 2024-04-22 DIAGNOSIS — I10 BENIGN ESSENTIAL HYPERTENSION: ICD-10-CM

## 2024-04-22 DIAGNOSIS — E78.2 MIXED HYPERLIPIDEMIA: Primary | ICD-10-CM

## 2024-04-22 RX ORDER — PRAVASTATIN SODIUM 80 MG/1
80 TABLET ORAL NIGHTLY
Qty: 90 TABLET | Refills: 3 | Status: SHIPPED | OUTPATIENT
Start: 2024-04-22

## 2024-04-22 RX ORDER — ISOSORBIDE MONONITRATE 60 MG/1
60 TABLET, EXTENDED RELEASE ORAL 2 TIMES DAILY
Qty: 180 TABLET | Refills: 3 | Status: SHIPPED | OUTPATIENT
Start: 2024-04-22

## 2024-04-23 RX ORDER — PRAVASTATIN SODIUM 80 MG/1
80 TABLET ORAL NIGHTLY
Qty: 90 TABLET | Refills: 0 | Status: SHIPPED | OUTPATIENT
Start: 2024-04-23

## 2024-04-23 RX ORDER — ISOSORBIDE MONONITRATE 60 MG/1
TABLET, EXTENDED RELEASE ORAL
Qty: 180 TABLET | Refills: 0 | Status: SHIPPED | OUTPATIENT
Start: 2024-04-23

## 2024-04-24 ENCOUNTER — TELEPHONE (OUTPATIENT)
Dept: NEUROLOGY | Age: 74
End: 2024-04-24

## 2024-04-29 ENCOUNTER — HOSPITAL ENCOUNTER (OUTPATIENT)
Dept: MRI IMAGING | Age: 74
Discharge: HOME | End: 2024-04-29
Attending: PSYCHIATRY & NEUROLOGY

## 2024-04-29 DIAGNOSIS — F03.B3 MODERATE DEMENTIA WITH MOOD DISTURBANCE, UNSPECIFIED DEMENTIA TYPE  (CMD): ICD-10-CM

## 2024-04-29 DIAGNOSIS — G25.9 EXTRAPYRAMIDAL DISORDER: ICD-10-CM

## 2024-04-29 PROCEDURE — 70551 MRI BRAIN STEM W/O DYE: CPT

## 2024-05-10 ENCOUNTER — PATIENT OUTREACH (OUTPATIENT)
Dept: CASE MANAGEMENT | Age: 74
End: 2024-05-10

## 2024-05-16 ENCOUNTER — TELEPHONE (OUTPATIENT)
Dept: NEUROLOGY | Age: 74
End: 2024-05-16

## 2024-05-16 RX ORDER — CLINDAMYCIN PHOSPHATE 10 UG/ML
LOTION TOPICAL
COMMUNITY
Start: 2024-05-08

## 2024-06-05 ENCOUNTER — APPOINTMENT (OUTPATIENT)
Dept: CARDIOLOGY | Age: 74
End: 2024-06-05

## 2024-06-12 ENCOUNTER — PATIENT OUTREACH (OUTPATIENT)
Dept: CASE MANAGEMENT | Age: 74
End: 2024-06-12

## 2024-06-12 NOTE — PROGRESS NOTES
Spoke to pt's daughter Chelly (HIPAA verified) she was at work and will follow up later.  Will reach out in a few weeks if no returned call.      Total time spent with patient including chart review: 6  Time spent with patient this month: 6    Total time spent with communication and chart review this month to date: 6

## 2024-06-18 ENCOUNTER — TELEPHONE (OUTPATIENT)
Dept: NEUROLOGY | Age: 74
End: 2024-06-18

## 2024-06-26 ENCOUNTER — APPOINTMENT (OUTPATIENT)
Dept: CARDIOLOGY | Age: 74
End: 2024-06-26

## 2024-07-11 ENCOUNTER — APPOINTMENT (OUTPATIENT)
Dept: NUCLEAR MEDICINE | Age: 74
End: 2024-07-11
Attending: PSYCHIATRY & NEUROLOGY

## 2024-07-16 ENCOUNTER — TELEPHONE (OUTPATIENT)
Dept: INTERNAL MEDICINE CLINIC | Facility: CLINIC | Age: 74
End: 2024-07-16

## 2024-07-18 ENCOUNTER — PATIENT OUTREACH (OUTPATIENT)
Dept: CASE MANAGEMENT | Age: 74
End: 2024-07-18

## 2024-07-18 NOTE — PROGRESS NOTES
Reviewed pt's chart including recent visits.  Attempted to contact pt for monthly outreach no answer left detailed message for pt to call back. \Total time spent with patient including chart review:   Time spent with patient this month: 5    Total time spent with communication and chart review this month to date: 5

## 2024-07-23 ENCOUNTER — TELEPHONE (OUTPATIENT)
Dept: INTERNAL MEDICINE CLINIC | Facility: CLINIC | Age: 74
End: 2024-07-23

## 2024-07-23 DIAGNOSIS — I10 PRIMARY HYPERTENSION: Primary | ICD-10-CM

## 2024-07-23 DIAGNOSIS — Z13.29 SCREENING FOR THYROID DISORDER: ICD-10-CM

## 2024-07-23 DIAGNOSIS — N40.0 BENIGN PROSTATIC HYPERPLASIA WITH ELEVATED PROSTATE SPECIFIC ANTIGEN (PSA): ICD-10-CM

## 2024-07-23 DIAGNOSIS — R97.20 BENIGN PROSTATIC HYPERPLASIA WITH ELEVATED PROSTATE SPECIFIC ANTIGEN (PSA): ICD-10-CM

## 2024-07-23 NOTE — TELEPHONE ENCOUNTER
Future Appointments   Date Time Provider Department Center   8/10/2024 10:30 AM Zachariah Becker MD [facility]     Labs placed per protocol.

## 2024-08-07 ENCOUNTER — APPOINTMENT (OUTPATIENT)
Dept: CARDIOLOGY | Age: 74
End: 2024-08-07

## 2024-08-07 VITALS
HEIGHT: 66 IN | SYSTOLIC BLOOD PRESSURE: 99 MMHG | WEIGHT: 185.19 LBS | DIASTOLIC BLOOD PRESSURE: 56 MMHG | BODY MASS INDEX: 29.76 KG/M2 | HEART RATE: 70 BPM

## 2024-08-07 DIAGNOSIS — E78.2 MIXED HYPERLIPIDEMIA: Chronic | ICD-10-CM

## 2024-08-07 DIAGNOSIS — I20.89 CHRONIC STABLE ANGINA (CMD): ICD-10-CM

## 2024-08-07 DIAGNOSIS — R35.1 BPH ASSOCIATED WITH NOCTURIA: Chronic | ICD-10-CM

## 2024-08-07 DIAGNOSIS — I25.118 ATHEROSCLEROSIS OF NATIVE CORONARY ARTERY OF NATIVE HEART WITH STABLE ANGINA PECTORIS (CMD): Primary | Chronic | ICD-10-CM

## 2024-08-07 DIAGNOSIS — N40.1 BPH ASSOCIATED WITH NOCTURIA: Chronic | ICD-10-CM

## 2024-08-07 DIAGNOSIS — Z95.5 S/P PRIMARY ANGIOPLASTY WITH CORONARY STENT: Chronic | ICD-10-CM

## 2024-08-07 DIAGNOSIS — R97.20 ELEVATED PSA: ICD-10-CM

## 2024-08-07 RX ORDER — FINASTERIDE 5 MG/1
5 TABLET, FILM COATED ORAL DAILY
COMMUNITY
Start: 2024-07-24

## 2024-08-07 RX ORDER — METHENAMINE HIPPURATE 1000 MG/1
1 TABLET ORAL 2 TIMES DAILY WITH MEALS
COMMUNITY
Start: 2024-07-24

## 2024-08-07 SDOH — HEALTH STABILITY: PHYSICAL HEALTH: ON AVERAGE, HOW MANY DAYS PER WEEK DO YOU ENGAGE IN MODERATE TO STRENUOUS EXERCISE (LIKE A BRISK WALK)?: 3 DAYS

## 2024-08-07 SDOH — HEALTH STABILITY: PHYSICAL HEALTH: ON AVERAGE, HOW MANY MINUTES DO YOU ENGAGE IN EXERCISE AT THIS LEVEL?: 50 MIN

## 2024-08-07 ASSESSMENT — PATIENT HEALTH QUESTIONNAIRE - PHQ9
SUM OF ALL RESPONSES TO PHQ9 QUESTIONS 1 AND 2: 0
1. LITTLE INTEREST OR PLEASURE IN DOING THINGS: NOT AT ALL
SUM OF ALL RESPONSES TO PHQ9 QUESTIONS 1 AND 2: 0
CLINICAL INTERPRETATION OF PHQ2 SCORE: NO FURTHER SCREENING NEEDED
2. FEELING DOWN, DEPRESSED OR HOPELESS: NOT AT ALL

## 2024-08-07 ASSESSMENT — ENCOUNTER SYMPTOMS
WEIGHT LOSS: 0
ALLERGIC/IMMUNOLOGIC COMMENTS: NO NEW FOOD ALLERGIES
FEVER: 0
CHILLS: 0
HEMATOCHEZIA: 0
WEIGHT GAIN: 0
HEMOPTYSIS: 0
SUSPICIOUS LESIONS: 0
COUGH: 0
BRUISES/BLEEDS EASILY: 0

## 2024-08-13 ENCOUNTER — PATIENT OUTREACH (OUTPATIENT)
Dept: CASE MANAGEMENT | Age: 74
End: 2024-08-13

## 2024-08-20 ENCOUNTER — TELEPHONE (OUTPATIENT)
Dept: CARDIOLOGY | Age: 74
End: 2024-08-20

## 2024-08-21 RX ORDER — CLOPIDOGREL BISULFATE 75 MG/1
75 TABLET ORAL DAILY
Qty: 90 TABLET | Refills: 1 | Status: SHIPPED | OUTPATIENT
Start: 2024-08-21

## 2024-09-03 NOTE — TELEPHONE ENCOUNTER
C/o left eye irritation and itching since Thursday   Spoke to pt daughter, Lewis. Lewis informed me that pt began sx on 12/25 (confused, fever). Pt went to ED was was dx with pneumonia. Pt was sent home and was initiallydoing better. A few days later, pt had increased confusion. Pt was taken back to ED and was dx with covid. Pt admitted. MRI and CT done. Neg for stroke. Pt daughter was told his confusion was because of covid brain fog. Pt was discharged (unable to walk/unoriented). Pt daughter then called EMS and took pt to [facility] ([facility] Pt was not admitted due to vitals/labs being stable. Lewis said pt is unable to care for himself (can't make food, take meds on his own, goes to other rooms when he is told to go to bathroom, can walk with walker). Lewis looking for guidance on what to do as she cannot care for him like this. Lewis said that getting pt in for appt would be difficult but agreeable if that is what is needed. LOV with TB 1/4/2022 for acute metabolic encephalopathy/confusion. LOV with PCP AS 3/10/2020. AS, please advise if you have any recommendations.

## 2024-09-12 ENCOUNTER — PATIENT OUTREACH (OUTPATIENT)
Dept: CASE MANAGEMENT | Age: 74
End: 2024-09-12

## 2024-10-08 ENCOUNTER — HOSPITAL ENCOUNTER (OUTPATIENT)
Dept: NUCLEAR MEDICINE | Age: 74
Discharge: HOME | End: 2024-10-08
Attending: PSYCHIATRY & NEUROLOGY

## 2024-10-08 DIAGNOSIS — G25.9 EXTRAPYRAMIDAL DISORDER: ICD-10-CM

## 2024-10-08 PROCEDURE — 78803 RP LOCLZJ TUM SPECT 1 AREA: CPT

## 2024-10-08 PROCEDURE — 10006150 HB RX 343: Performed by: PSYCHIATRY & NEUROLOGY

## 2024-10-08 PROCEDURE — A9584 IODINE I-123 IOFLUPANE: HCPCS | Performed by: PSYCHIATRY & NEUROLOGY

## 2024-10-08 RX ADMIN — IOFLUPANE I-123 5.3 MILLICURIE: 2 INJECTION, SOLUTION INTRAVENOUS at 09:15

## 2024-10-09 ENCOUNTER — PATIENT OUTREACH (OUTPATIENT)
Dept: CASE MANAGEMENT | Age: 74
End: 2024-10-09

## 2024-10-10 ENCOUNTER — TELEPHONE (OUTPATIENT)
Dept: NEUROLOGY | Age: 74
End: 2024-10-10

## 2024-10-14 ENCOUNTER — TELEPHONE (OUTPATIENT)
Dept: NEUROLOGY | Age: 74
End: 2024-10-14

## 2024-10-14 DIAGNOSIS — G47.52 REM BEHAVIORAL DISORDER: Primary | ICD-10-CM

## 2024-10-14 DIAGNOSIS — G20.C PRIMARY PARKINSONISM  (CMD): ICD-10-CM

## 2024-10-14 PROBLEM — I10 HYPERTENSION: Status: ACTIVE | Noted: 2024-06-16

## 2024-10-14 RX ORDER — CLOTRIMAZOLE AND BETAMETHASONE DIPROPIONATE 10; .64 MG/G; MG/G
CREAM TOPICAL
COMMUNITY
Start: 2024-06-25

## 2024-10-14 RX ORDER — POLYETHYLENE GLYCOL 3350 17 G/17G
17 POWDER, FOR SOLUTION ORAL DAILY
COMMUNITY
Start: 2024-06-26

## 2024-10-14 RX ORDER — CLONAZEPAM 1 MG/1
1 TABLET ORAL NIGHTLY
COMMUNITY
Start: 2024-06-25 | End: 2024-10-14 | Stop reason: SDUPTHER

## 2024-10-14 RX ORDER — MAGNESIUM HYDROXIDE/ALUMINUM HYDROXICE/SIMETHICONE 120; 1200; 1200 MG/30ML; MG/30ML; MG/30ML
30 SUSPENSION ORAL
COMMUNITY
Start: 2024-06-25

## 2024-10-14 RX ORDER — CLONAZEPAM 1 MG/1
1 TABLET ORAL NIGHTLY
Qty: 90 TABLET | Refills: 1 | Status: SHIPPED | OUTPATIENT
Start: 2024-10-14

## 2024-10-14 RX ORDER — PANTOPRAZOLE SODIUM 40 MG/1
40 TABLET, DELAYED RELEASE ORAL DAILY
COMMUNITY

## 2024-10-14 RX ORDER — AMOXICILLIN 250 MG
1 CAPSULE ORAL 2 TIMES DAILY
COMMUNITY
Start: 2024-06-25

## 2024-10-14 RX ORDER — CARBIDOPA AND LEVODOPA 25; 100 MG/1; MG/1
TABLET ORAL
Qty: 45 TABLET | Refills: 2 | Status: SHIPPED | OUTPATIENT
Start: 2024-10-14

## 2024-10-14 RX ORDER — LIDOCAINE 4 G/G
3 PATCH TOPICAL
COMMUNITY
Start: 2024-06-26

## 2024-11-08 ENCOUNTER — PATIENT OUTREACH (OUTPATIENT)
Dept: CASE MANAGEMENT | Age: 74
End: 2024-11-08

## 2024-11-08 NOTE — PROGRESS NOTES
Reviewed pt's chart including recent visits.  Attempted to contact pt for monthly outreach no answer invalid number for pt (cell) not able to leave detailed message for pt to call back. Will send letter.   Total time spent with patient including chart review: 4  Time spent with patient this month: 4    Total time spent with communication and chart review this month to date: 4

## 2024-11-12 ENCOUNTER — TELEPHONE (OUTPATIENT)
Dept: NEUROLOGY | Age: 74
End: 2024-11-12

## 2024-11-18 ENCOUNTER — APPOINTMENT (OUTPATIENT)
Dept: NEUROLOGY | Age: 74
End: 2024-11-18

## 2024-11-18 VITALS
SYSTOLIC BLOOD PRESSURE: 106 MMHG | WEIGHT: 182.76 LBS | HEIGHT: 66 IN | TEMPERATURE: 97.7 F | BODY MASS INDEX: 29.37 KG/M2 | RESPIRATION RATE: 16 BRPM | HEART RATE: 67 BPM | DIASTOLIC BLOOD PRESSURE: 64 MMHG | OXYGEN SATURATION: 97 %

## 2024-11-18 DIAGNOSIS — F03.B3 MODERATE DEMENTIA WITH MOOD DISTURBANCE, UNSPECIFIED DEMENTIA TYPE  (CMD): Primary | ICD-10-CM

## 2024-11-18 DIAGNOSIS — G25.9 EXTRAPYRAMIDAL DISORDER: ICD-10-CM

## 2024-11-18 RX ORDER — ASCORBIC ACID 500 MG
500 TABLET ORAL 2 TIMES DAILY
COMMUNITY

## 2024-11-18 RX ORDER — RIVASTIGMINE TARTRATE 1.5 MG/1
CAPSULE ORAL
Qty: 70 CAPSULE | Refills: 0 | Status: SHIPPED | OUTPATIENT
Start: 2024-11-18

## 2024-11-18 ASSESSMENT — ENCOUNTER SYMPTOMS
GASTROINTESTINAL NEGATIVE: 1
PSYCHIATRIC NEGATIVE: 1
ALLERGIC/IMMUNOLOGIC NEGATIVE: 1
HEMATOLOGIC/LYMPHATIC NEGATIVE: 1
CONSTITUTIONAL NEGATIVE: 1
NEUROLOGICAL NEGATIVE: 1
RESPIRATORY NEGATIVE: 1
ENDOCRINE NEGATIVE: 1
EYES NEGATIVE: 1

## 2024-11-22 DIAGNOSIS — F03.B3 MODERATE DEMENTIA WITH MOOD DISTURBANCE, UNSPECIFIED DEMENTIA TYPE  (CMD): ICD-10-CM

## 2024-11-22 RX ORDER — RIVASTIGMINE TARTRATE 1.5 MG/1
CAPSULE ORAL
Qty: 225 CAPSULE | OUTPATIENT
Start: 2024-11-22

## 2024-11-24 DIAGNOSIS — F03.B3 MODERATE DEMENTIA WITH MOOD DISTURBANCE, UNSPECIFIED DEMENTIA TYPE  (CMD): ICD-10-CM

## 2024-11-25 RX ORDER — MEMANTINE HYDROCHLORIDE 5 MG/1
5 TABLET ORAL 2 TIMES DAILY
Qty: 180 TABLET | Refills: 1 | Status: SHIPPED | OUTPATIENT
Start: 2024-11-25

## 2024-12-16 ENCOUNTER — TELEPHONE (OUTPATIENT)
Dept: NEUROLOGY | Age: 74
End: 2024-12-16

## 2024-12-16 DIAGNOSIS — F03.B3 MODERATE DEMENTIA WITH MOOD DISTURBANCE, UNSPECIFIED DEMENTIA TYPE  (CMD): ICD-10-CM

## 2024-12-16 DIAGNOSIS — G30.1 LATE ONSET ALZHEIMER'S DEMENTIA WITHOUT BEHAVIORAL DISTURBANCE  (CMD): Primary | ICD-10-CM

## 2024-12-16 DIAGNOSIS — F02.80 LATE ONSET ALZHEIMER'S DEMENTIA WITHOUT BEHAVIORAL DISTURBANCE  (CMD): Primary | ICD-10-CM

## 2024-12-16 RX ORDER — RIVASTIGMINE TARTRATE 3 MG/1
3 CAPSULE ORAL 2 TIMES DAILY
Qty: 180 CAPSULE | Refills: 1 | Status: SHIPPED | OUTPATIENT
Start: 2024-12-16

## 2024-12-26 ENCOUNTER — PATIENT OUTREACH (OUTPATIENT)
Dept: CASE MANAGEMENT | Age: 74
End: 2024-12-26

## 2025-01-22 ENCOUNTER — PATIENT OUTREACH (OUTPATIENT)
Dept: CASE MANAGEMENT | Age: 75
End: 2025-01-22

## 2025-01-22 NOTE — PROGRESS NOTES
Reviewed pt's chart including recent visits.  Attempted to contact pt for monthly outreach no answer phone just rings not able to leave detailed message for pt to call back.   Total time spent with patient including chart review: 5  Time spent with patient this month: 5    Total time spent with communication and chart review this month to date: 5

## 2025-02-03 RX ORDER — CLOPIDOGREL BISULFATE 75 MG/1
75 TABLET ORAL DAILY
Qty: 90 TABLET | Refills: 1 | Status: SHIPPED | OUTPATIENT
Start: 2025-02-03

## 2025-02-12 ENCOUNTER — APPOINTMENT (OUTPATIENT)
Dept: CARDIOLOGY | Age: 75
End: 2025-02-12

## 2025-02-16 DIAGNOSIS — F03.B3 MODERATE DEMENTIA WITH MOOD DISTURBANCE, UNSPECIFIED DEMENTIA TYPE  (CMD): ICD-10-CM

## 2025-02-17 RX ORDER — MEMANTINE HYDROCHLORIDE 5 MG/1
5 TABLET ORAL 2 TIMES DAILY
Qty: 180 TABLET | Refills: 1 | Status: SHIPPED | OUTPATIENT
Start: 2025-02-17

## 2025-02-20 ENCOUNTER — PATIENT OUTREACH (OUTPATIENT)
Dept: CASE MANAGEMENT | Age: 75
End: 2025-02-20

## 2025-03-13 ENCOUNTER — PATIENT OUTREACH (OUTPATIENT)
Dept: CASE MANAGEMENT | Age: 75
End: 2025-03-13

## 2025-03-13 NOTE — PROGRESS NOTES
I have left several messages with no return call. Letter sent.  Removing from program at this time. Pt can re-enroll by calling the care management line or there PCP office. Sending letter with info.

## 2025-03-23 DIAGNOSIS — E78.2 MIXED HYPERLIPIDEMIA: ICD-10-CM

## 2025-03-23 DIAGNOSIS — I10 BENIGN ESSENTIAL HYPERTENSION: ICD-10-CM

## 2025-03-24 RX ORDER — ISOSORBIDE MONONITRATE 60 MG/1
60 TABLET, EXTENDED RELEASE ORAL 2 TIMES DAILY
Qty: 180 TABLET | Refills: 1 | Status: SHIPPED | OUTPATIENT
Start: 2025-03-24

## 2025-03-26 DIAGNOSIS — I10 BENIGN ESSENTIAL HYPERTENSION: ICD-10-CM

## 2025-03-26 DIAGNOSIS — E78.2 MIXED HYPERLIPIDEMIA: ICD-10-CM

## 2025-03-26 RX ORDER — ISOSORBIDE MONONITRATE 60 MG/1
60 TABLET, EXTENDED RELEASE ORAL 2 TIMES DAILY
Qty: 180 TABLET | Refills: 1 | OUTPATIENT
Start: 2025-03-26

## 2025-04-16 ENCOUNTER — APPOINTMENT (OUTPATIENT)
Dept: CARDIOLOGY | Age: 75
End: 2025-04-16

## 2025-04-23 ENCOUNTER — APPOINTMENT (OUTPATIENT)
Dept: CARDIOLOGY | Age: 75
End: 2025-04-23

## 2025-04-23 VITALS
SYSTOLIC BLOOD PRESSURE: 102 MMHG | DIASTOLIC BLOOD PRESSURE: 63 MMHG | WEIGHT: 180.78 LBS | BODY MASS INDEX: 29.05 KG/M2 | HEART RATE: 68 BPM | HEIGHT: 66 IN

## 2025-04-23 DIAGNOSIS — E78.2 MIXED HYPERLIPIDEMIA: ICD-10-CM

## 2025-04-23 DIAGNOSIS — I10 BENIGN ESSENTIAL HYPERTENSION: ICD-10-CM

## 2025-04-23 DIAGNOSIS — R00.2 PALPITATIONS: Primary | ICD-10-CM

## 2025-04-23 RX ORDER — CHOLECALCIFEROL (VITAMIN D3) 1250 MCG
1.25 CAPSULE ORAL
COMMUNITY
Start: 2025-04-13

## 2025-04-23 RX ORDER — PRAVASTATIN SODIUM 80 MG/1
80 TABLET ORAL NIGHTLY
Qty: 90 TABLET | Refills: 3 | Status: SHIPPED | OUTPATIENT
Start: 2025-04-23

## 2025-04-23 RX ORDER — ISOSORBIDE MONONITRATE 60 MG/1
60 TABLET, EXTENDED RELEASE ORAL 2 TIMES DAILY
Qty: 180 TABLET | Refills: 3 | Status: SHIPPED | OUTPATIENT
Start: 2025-04-23

## 2025-04-23 RX ORDER — LEVOTHYROXINE SODIUM 25 UG/1
25 TABLET ORAL DAILY
COMMUNITY
Start: 2025-04-15

## 2025-04-23 RX ORDER — CLOPIDOGREL BISULFATE 75 MG/1
75 TABLET ORAL DAILY
Qty: 90 TABLET | Refills: 3 | Status: SHIPPED | OUTPATIENT
Start: 2025-04-23

## 2025-04-23 RX ORDER — PANTOPRAZOLE SODIUM 40 MG/1
40 TABLET, DELAYED RELEASE ORAL DAILY
Qty: 90 TABLET | Refills: 3 | Status: SHIPPED | OUTPATIENT
Start: 2025-04-23

## 2025-04-23 RX ORDER — NITROGLYCERIN 0.4 MG/1
0.4 TABLET SUBLINGUAL EVERY 5 MIN PRN
Qty: 25 TABLET | Refills: 5 | Status: SHIPPED | OUTPATIENT
Start: 2025-04-23

## 2025-04-23 ASSESSMENT — ENCOUNTER SYMPTOMS
BRUISES/BLEEDS EASILY: 0
CHILLS: 0
WEIGHT LOSS: 0
COUGH: 0
WEIGHT GAIN: 0
FEVER: 0
HEMOPTYSIS: 0
HEMATOCHEZIA: 0
ALLERGIC/IMMUNOLOGIC COMMENTS: NO NEW FOOD ALLERGIES
SUSPICIOUS LESIONS: 0

## 2025-06-03 ENCOUNTER — TELEPHONE (OUTPATIENT)
Age: 75
End: 2025-06-03

## 2025-06-03 NOTE — TELEPHONE ENCOUNTER
Spoke to dtr Chelly to schedule AWV-[place] and has new pcp there    Please remove Dr. Zachariah Becker as pcp

## 2025-06-06 ENCOUNTER — PATIENT OUTREACH (OUTPATIENT)
Dept: CASE MANAGEMENT | Age: 75
End: 2025-06-06

## 2025-06-06 NOTE — PROCEDURES
The office order for PCP removal request is Approved and finalized on June 6, 2025.    Removed Zachariah Becker MD as the patient's Primary Care Physician

## (undated) DIAGNOSIS — I10 PRIMARY HYPERTENSION: ICD-10-CM

## (undated) DIAGNOSIS — M65.4 DE QUERVAIN'S DISEASE (TENOSYNOVITIS): ICD-10-CM

## (undated) DIAGNOSIS — Z95.5 HISTORY OF CORONARY ARTERY STENT PLACEMENT: ICD-10-CM

## (undated) DIAGNOSIS — I25.10 CORONARY ARTERY DISEASE INVOLVING NATIVE CORONARY ARTERY OF NATIVE HEART WITHOUT ANGINA PECTORIS: ICD-10-CM

## (undated) DIAGNOSIS — R26.81 GAIT INSTABILITY: ICD-10-CM

## (undated) DIAGNOSIS — F01.50 VASCULAR DEMENTIA WITHOUT BEHAVIORAL DISTURBANCE (HCC): ICD-10-CM

## (undated) DIAGNOSIS — M19.049 CMC ARTHRITIS: ICD-10-CM

## (undated) DIAGNOSIS — E78.00 ELEVATED CHOLESTEROL: ICD-10-CM

## (undated) DIAGNOSIS — Z95.1 HX OF CABG: ICD-10-CM

## (undated) DIAGNOSIS — I25.2 H/O NON-ST ELEVATION MYOCARDIAL INFARCTION (NSTEMI): ICD-10-CM

## (undated) DIAGNOSIS — R97.20 ELEVATED PSA: ICD-10-CM

## (undated) DIAGNOSIS — M06.042 RHEUMATOID ARTHRITIS INVOLVING BOTH HANDS WITH NEGATIVE RHEUMATOID FACTOR (HCC): ICD-10-CM

## (undated) DIAGNOSIS — Z98.61 S/P PTCA (PERCUTANEOUS TRANSLUMINAL CORONARY ANGIOPLASTY): ICD-10-CM

## (undated) DIAGNOSIS — M06.041 RHEUMATOID ARTHRITIS INVOLVING BOTH HANDS WITH NEGATIVE RHEUMATOID FACTOR (HCC): ICD-10-CM

## (undated) DIAGNOSIS — G47.33 OBSTRUCTIVE SLEEP APNEA: ICD-10-CM

## (undated) DIAGNOSIS — K76.0 STEATOSIS OF LIVER: ICD-10-CM

## (undated) NOTE — Clinical Note
Hi, Dr. Leidy Chavez! Thank you for referring Mr. Alpa Weeks. for rheumatologic evaluation. Please see the discussion portion of my note and let me know if you have any questions.  It's not clear why he's so tender on exam, but i'll work him and

## (undated) NOTE — LETTER
[facility] [address], [address]  Consent for Procedure/Sedation    Date:     Time:       1.  I authorize the performance upon [facility]. the following:cardiac catheterization, left ventricular cineangiography, bilatera period, the physician will determine when the applicable recovery period ends for purposes of reinstating the Do Not Resuscitate (DNR) order.     Signature of Patient: ____________________________________________________    Signature of person authorized

## (undated) NOTE — Clinical Note
Yang Dougherty stopped the plaquenil and cymbalta when admitted to outside hospital for bad uti, nearly septic. I told him to resume pleaquenil and cymbalta. It seems the just stopped new meds, due to confusion when he presented, but turned out to be uti.  Ad

## (undated) NOTE — LETTER
March 23, 2020    Odom Began. ANDI/Stu Odell      Dear Philip Quevedo: It was a pleasure speaking with you over the phone recently.  To follow up, I wanted to send you my contact information to utilize when you have a

## (undated) NOTE — LETTER
03/28/19        Mariana Robert.   [address] [address]      Dear Em Greer,    [facility] records indicate that you have outstanding FASTING lab work and or testing that was ordered for you and has not yet been completed:  [address]

## (undated) NOTE — LETTER
October 10, 2019    Aristeo1 Marjorie ESCAMILLA/Stu Odell    Dear Kory Stevenson: It was a pleasure speaking with you over the phone recently.  To follow up, I wanted to send you some contact information to utilize when you have a

## (undated) NOTE — LETTER
04/09/20        Kimi Prim. [address] [address]      Dear Kaleb Anderson,    [facility] records indicate that you have outstanding lab work and or testing that was ordered for you and has not yet been completed.  Due to the current

## (undated) NOTE — LETTER
[facility] [address], [address]  Consent for Procedure/Sedation    Date: 6/21/2019    Time: 0700      1.  I authorize the performance upon [facility]. the following:cardiac catheterization, left ventricular cineangiogra period, the physician will determine when the applicable recovery period ends for purposes of reinstating the Do Not Resuscitate (DNR) order.     Signature of Patient: ____________________________________________________    Signature of person authorized

## (undated) NOTE — LETTER
Juan Segura Jr.  [address] [address]      Dear Juan:    You had previously enrolled in our Chronic Care Management program and had been speaking with your Health . We have since made several attempts to reach you by phone over the past several months to continue with these services . Unfortunately, we have not heard back from you. We hope all is going well with you and your health.     As we have not heard back from you, we assume you no longer wish to have these services and want to be removed from the program. If at any point you would like to re-enroll, please call your Primary Care Doctor or the Care Management line at [phone].  There are no enrollment or cancellation fees. You can enroll at any time.      Please call us If you start to feel that you may need help keeping your health on track. We would be more than happy to enroll you in the program once again.     It was a pleasure working with you,    [facility]  Care Management Department

## (undated) NOTE — LETTER
[facility] POPULATION HEALTH MANAGEMENT DEPARTMENT  [address] [address]  PH: [phone]  FAX: [phone]      Juan Segura Jr.  [address] [address]        Dear Juan:      I have made multiple attempts to reach you by phone over the past several weeks.  Unfortunately, I have not heard back from you. I hope all is going well.     To provide you with the best possible care, please give me a call at your earliest convenience.     If you have recently contacted me back, please disregard this letter.    I hope to hear from you soon    RINA Simmons-[facility]       RINA Simmons  [facility] Care Manager/Health CoachPopulation Health Management Dept.  [facility]  [address] [place] [address]. [place] [address]  (Office [phone]  [email or web address]

## (undated) NOTE — IP AVS SNAPSHOT
[facility] [place] [address] [place], [address] ~ [phone]                Discharge Summary   5/12/2017    Alpa Weeks.            Admission Information        Provider Department    5/12/2017 Jesusita Buchanan MD  2ne-A Last time this was given:  75 mg on 5/13/2017 10:26 AM   Commonly known as:  PLAVIX   Next dose due:  Sunday        Take 75 mg by mouth daily.          9 AM                   Dicyclomine HCl 10 MG Caps   Commonly known as:  BENTYL        Take 1 capsule (10 - atenolol 50 MG Tabs            Discharge References/Attachments     ANGIOPLASTY, CORONARY (ENGLISH)    CORONARY ANGIOPLASTY, DISCHARGE INSTRUCTIONS FOR (ENGLISH)    ATENOLOL TABLETS (ENGLISH)      Follow-up Information     Follow up with Mona Mchugh, - If you don’t have insurance, Eladio Monroe has partnered with [employer] to help you get signed up for insurance coverage.   [employer] is a Federal Navigator program that can help with your Affordable Care Act cover Other Blood Thinners     Platelet Aggregation Inhibitors    Clopidogrel Bisulfate (PLAVIX) 75 MG Oral Tab       Use: Prevent the development or progression of blood clots   Most common side effects: Abnormal bleeding   What to report to your healthcare tea nausea/vomiting, somnolence   What to report to your healthcare team: Dizziness, Somnolence, Weakness, Headache, Nausea/vomiting           Calming and Sleep Medications     ALPRAZOLAM 0.25 MG Oral Tab       Use: Treat agitation and difficulty sleeping   Mo

## (undated) NOTE — Clinical Note
Yang Hood!Just saw Mr. Rene Zacarias. He's still got significant tenderness to the touch of his hands. I'm stopping plaquenil and starting cymbalta to monitor for any response. Of note though, he had significant slowed speech.  He was referred to a neurologist at [facility]